# Patient Record
Sex: MALE | Race: WHITE | NOT HISPANIC OR LATINO | Employment: PART TIME | URBAN - METROPOLITAN AREA
[De-identification: names, ages, dates, MRNs, and addresses within clinical notes are randomized per-mention and may not be internally consistent; named-entity substitution may affect disease eponyms.]

---

## 2017-01-31 ENCOUNTER — OFFICE VISIT (OUTPATIENT)
Dept: URGENT CARE | Facility: URGENT CARE | Age: 32
End: 2017-01-31
Payer: COMMERCIAL

## 2017-01-31 VITALS
OXYGEN SATURATION: 98 % | HEART RATE: 118 BPM | DIASTOLIC BLOOD PRESSURE: 70 MMHG | TEMPERATURE: 98.3 F | SYSTOLIC BLOOD PRESSURE: 118 MMHG | WEIGHT: 220 LBS

## 2017-01-31 DIAGNOSIS — J20.9 ACUTE BRONCHITIS WITH SYMPTOMS > 10 DAYS: ICD-10-CM

## 2017-01-31 DIAGNOSIS — R68.89 FLU-LIKE SYMPTOMS: Primary | ICD-10-CM

## 2017-01-31 LAB
ERYTHROCYTE [DISTWIDTH] IN BLOOD BY AUTOMATED COUNT: 12.1 % (ref 10–15)
FLUAV+FLUBV AG SPEC QL: NORMAL
FLUAV+FLUBV AG SPEC QL: NORMAL
HCT VFR BLD AUTO: 48 % (ref 40–53)
HGB BLD-MCNC: 16 G/DL (ref 13.3–17.7)
MCH RBC QN AUTO: 29.7 PG (ref 26.5–33)
MCHC RBC AUTO-ENTMCNC: 33.3 G/DL (ref 31.5–36.5)
MCV RBC AUTO: 89 FL (ref 78–100)
PLATELET # BLD AUTO: 319 10E9/L (ref 150–450)
RBC # BLD AUTO: 5.39 10E12/L (ref 4.4–5.9)
SPECIMEN SOURCE: NORMAL
WBC # BLD AUTO: 5.4 10E9/L (ref 4–11)

## 2017-01-31 PROCEDURE — 36415 COLL VENOUS BLD VENIPUNCTURE: CPT | Performed by: PHYSICIAN ASSISTANT

## 2017-01-31 PROCEDURE — 87804 INFLUENZA ASSAY W/OPTIC: CPT | Performed by: PHYSICIAN ASSISTANT

## 2017-01-31 PROCEDURE — 80048 BASIC METABOLIC PNL TOTAL CA: CPT | Performed by: PHYSICIAN ASSISTANT

## 2017-01-31 PROCEDURE — 85027 COMPLETE CBC AUTOMATED: CPT | Performed by: PHYSICIAN ASSISTANT

## 2017-01-31 PROCEDURE — 99202 OFFICE O/P NEW SF 15 MIN: CPT | Performed by: PHYSICIAN ASSISTANT

## 2017-01-31 RX ORDER — AZITHROMYCIN 250 MG/1
TABLET, FILM COATED ORAL
Qty: 6 TABLET | Refills: 0 | Status: SHIPPED | OUTPATIENT
Start: 2017-01-31 | End: 2018-06-18

## 2017-01-31 NOTE — PATIENT INSTRUCTIONS
Bronchitis, Antibiotic Treatment (Adult)    Bronchitis is an infection of the air passages (bronchial tubes) in your lungs. It often occurs when you have a cold. This illness is contagious during the first few days and is spread through the air by coughing and sneezing, or by direct contact (touching the sick person and then touching your own eyes, nose, or mouth).  Symptoms of bronchitis include cough with mucus (phlegm) and low-grade fever. Bronchitis usually lasts 7 to 14 days. Mild cases can be treated with simple home remedies. More severe infection is treated with an antibiotic.  Home care  Follow these guidelines when caring for yourself at home:    If your symptoms are severe, rest at home for the first 2 to 3 days. When you go back to your usual activities, don't let yourself get too tired.    Do not smoke. Also avoid being exposed to secondhand smoke.    You may use over-the-counter medicines to control fever or pain, unless another medicine was prescribed. (Note: If you have chronic liver or kidney disease or have ever had a stomach ulcer or gastrointestinal bleeding, talk with your healthcare provider before using these medicines. Also talk to your provider if you are taking medicine to prevent blood clots.) Aspirin should never be given to anyone younger than 18 years of age who is ill with a viral infection or fever. It may cause severe liver or brain damage.    Your appetite may be poor, so a light diet is fine. Avoid dehydration by drinking 6 to 8 glasses of fluids per day (such as water, soft drinks, sports drinks, juices, tea, or soup). Extra fluids will help loosen secretions in the nose and lungs.    Over-the-counter cough, cold, and sore-throat medicines will not shorten the length of the illness, but they may be helpful to reduce symptoms. (Note: Do not use decongestants if you have high blood pressure.)    Finish all antibiotic medicine. Do this even if you are feeling better after only a  few days.  Follow-up care  Follow up with your healthcare provider, or as advised. If you had an X-ray or ECG (electrocardiogram), a specialist will review it. You will be notified of any new findings that may affect your care.  Note: If you are age 65 or older, or if you have a chronic lung disease or condition that affects your immune system, or you smoke, talk to your healthcare provider about having pneumococcal vaccinations and a yearly influenza vaccination (flu shot).  When to seek medical advice  Call your healthcare provider right away if any of these occur:    Fever of 100.4 F (38 C) or higher    Coughing up increased amounts of colored sputum    Weakness, drowsiness, headache, facial pain, ear pain, or a stiff neck   Call 911, or get immediate medical care  Contact emergency services right away if any of these occur.    Coughing up blood    Worsening weakness, drowsiness, headache, or stiff neck    Trouble breathing, wheezing, or pain with breathing    4549-7568 [employer]. [address] [place] [address]. All rights reserved. This information is not intended as a substitute for professional medical care. Always follow your healthcare professional's instructions.        Diet for Vomiting or Diarrhea (Adult)  If your symptoms return or get worse after eating certain foods listed below, you should stop eating them until your symptoms ease and you feel better.  Once the vomiting stops, then follow the steps below.   During the first 12 to 24 hours  During the first 12 to 24 hours, follow this diet:    Beverages. Plain water, sport drinks like electrolyte solutions, soft drinks without caffeine, mineral water (plain or flavored), clear fruit juices, and decaffeinated tea and coffee.    Soups. Clear broth, consommé, and bouillon.    Desserts. Plain gelatin, popsicles, and fruit juice bars. As you feel better, you may add 6 to 8 ounces of yogurt per day. If you have diarrhea, don't  have foods or beverages that contain sugar, high-fructose corn syrup, or sugar alcohols.  During the next 24 hours  During the next 24 hours you may add the following to the above:    Hot cereal, plain toast, bread, rolls, and crackers    Plain noodles, rice, mashed potatoes, and chicken noodle or rice soup    Unsweetened canned fruit (but not pineapple) and bananas  Don't have more than 15 grams of fat a day. Do this by staying away from margarine, butter, oils, mayonnaise, sauces, gravies, fried foods, peanut butter, meat, poultry, and fish.  Don't eat much fiber. Stay away from raw or cooked vegetables, fresh fruits (except bananas), and bran cereals.  Limit how much caffeine and chocolate you have. Do not use any spices or seasonings except salt.  During the next 24 hours  Gradually go back to your normal diet, as you feel better and your symptoms ease.    9403-2510 [employer]. [address] [place] [address], [place] [address]. All rights reserved. This information is not intended as a substitute for professional medical care. Always follow your healthcare professional's instructions.

## 2017-01-31 NOTE — MR AVS SNAPSHOT
After Visit Summary   1/31/2017    Mike Villalpando    MRN: [id number]           Patient Information     Date Of Birth          1985        Visit Information        Provider Department      1/31/2017 3:30 PM Mann Gifford PA-C [facility]        Today's Diagnoses     Flu-like symptoms    -  1     Acute bronchitis with symptoms > 10 days           Care Instructions      Bronchitis, Antibiotic Treatment (Adult)    Bronchitis is an infection of the air passages (bronchial tubes) in your lungs. It often occurs when you have a cold. This illness is contagious during the first few days and is spread through the air by coughing and sneezing, or by direct contact (touching the sick person and then touching your own eyes, nose, or mouth).  Symptoms of bronchitis include cough with mucus (phlegm) and low-grade fever. Bronchitis usually lasts 7 to 14 days. Mild cases can be treated with simple home remedies. More severe infection is treated with an antibiotic.  Home care  Follow these guidelines when caring for yourself at home:    If your symptoms are severe, rest at home for the first 2 to 3 days. When you go back to your usual activities, don't let yourself get too tired.    Do not smoke. Also avoid being exposed to secondhand smoke.    You may use over-the-counter medicines to control fever or pain, unless another medicine was prescribed. (Note: If you have chronic liver or kidney disease or have ever had a stomach ulcer or gastrointestinal bleeding, talk with your healthcare provider before using these medicines. Also talk to your provider if you are taking medicine to prevent blood clots.) Aspirin should never be given to anyone younger than 18 years of age who is ill with a viral infection or fever. It may cause severe liver or brain damage.    Your appetite may be poor, so a light diet is fine. Avoid dehydration by drinking 6 to 8 glasses of fluids per day (such as  water, soft drinks, sports drinks, juices, tea, or soup). Extra fluids will help loosen secretions in the nose and lungs.    Over-the-counter cough, cold, and sore-throat medicines will not shorten the length of the illness, but they may be helpful to reduce symptoms. (Note: Do not use decongestants if you have high blood pressure.)    Finish all antibiotic medicine. Do this even if you are feeling better after only a few days.  Follow-up care  Follow up with your healthcare provider, or as advised. If you had an X-ray or ECG (electrocardiogram), a specialist will review it. You will be notified of any new findings that may affect your care.  Note: If you are age 65 or older, or if you have a chronic lung disease or condition that affects your immune system, or you smoke, talk to your healthcare provider about having pneumococcal vaccinations and a yearly influenza vaccination (flu shot).  When to seek medical advice  Call your healthcare provider right away if any of these occur:    Fever of 100.4 F (38 C) or higher    Coughing up increased amounts of colored sputum    Weakness, drowsiness, headache, facial pain, ear pain, or a stiff neck   Call 911, or get immediate medical care  Contact emergency services right away if any of these occur.    Coughing up blood    Worsening weakness, drowsiness, headache, or stiff neck    Trouble breathing, wheezing, or pain with breathing    8312-2034 [employer]. [address] [place] [address]. All rights reserved. This information is not intended as a substitute for professional medical care. Always follow your healthcare professional's instructions.        Diet for Vomiting or Diarrhea (Adult)  If your symptoms return or get worse after eating certain foods listed below, you should stop eating them until your symptoms ease and you feel better.  Once the vomiting stops, then follow the steps below.   During the first 12 to 24 hours  During the first 12  to 24 hours, follow this diet:    Beverages. Plain water, sport drinks like electrolyte solutions, soft drinks without caffeine, mineral water (plain or flavored), clear fruit juices, and decaffeinated tea and coffee.    Soups. Clear broth, consommé, and bouillon.    Desserts. Plain gelatin, popsicles, and fruit juice bars. As you feel better, you may add 6 to 8 ounces of yogurt per day. If you have diarrhea, don't have foods or beverages that contain sugar, high-fructose corn syrup, or sugar alcohols.  During the next 24 hours  During the next 24 hours you may add the following to the above:    Hot cereal, plain toast, bread, rolls, and crackers    Plain noodles, rice, mashed potatoes, and chicken noodle or rice soup    Unsweetened canned fruit (but not pineapple) and bananas  Don't have more than 15 grams of fat a day. Do this by staying away from margarine, butter, oils, mayonnaise, sauces, gravies, fried foods, peanut butter, meat, poultry, and fish.  Don't eat much fiber. Stay away from raw or cooked vegetables, fresh fruits (except bananas), and bran cereals.  Limit how much caffeine and chocolate you have. Do not use any spices or seasonings except salt.  During the next 24 hours  Gradually go back to your normal diet, as you feel better and your symptoms ease.    4243-8655 The [employer]. [address] [place] [address]. All rights reserved. This information is not intended as a substitute for professional medical care. Always follow your healthcare professional's instructions.              Follow-ups after your visit        Who to contact     If you have questions or need follow up information about today's clinic visit or your schedule please contact [facility] directly at [phone].  Normal or non-critical lab and imaging results will be communicated to you by MyChart, letter or phone within 4 business days after the clinic has received the results. If you do not  "hear from us within 7 days, please contact the clinic through [email or web address] or phone. If you have a critical or abnormal lab result, we will notify you by phone as soon as possible.  Submit refill requests through [email or web address] or call your pharmacy and they will forward the refill request to us. Please allow 3 business days for your refill to be completed.          Additional Information About Your Visit        [employer] Information     [email or web address] lets you send messages to your doctor, view your test results, renew your prescriptions, schedule appointments and more. To sign up, go to [email or web address] . Click on \"Log in\" on the left side of the screen, which will take you to the Welcome page. Then click on \"Sign up Now\" on the right side of the page.     You will be asked to enter the access code listed below, as well as some personal information. Please follow the directions to create your username and password.     Your access code is: [email or web address]  Expires: 2017  4:05 PM     Your access code will  in 90 days. If you need help or a new code, please call your [place] clinic or [phone].        Care EveryWhere ID     This is your Care EveryWhere ID. This could be used by other organizations to access your [place] medical records  [id number]        Your Vitals Were     Pulse Temperature Pulse Oximetry             118 98.3  F (36.8  C) (Oral) 98%          Blood Pressure from Last 3 Encounters:   17 118/70    Weight from Last 3 Encounters:   17 220 lb (99.791 kg)              We Performed the Following     Basic metabolic panel  (Ca, Cl, CO2, Creat, Gluc, K, Na, BUN)     CBC with platelets     Influenza A/B antigen          Today's Medication Changes          These changes are accurate as of: 17  4:05 PM.  If you have any questions, ask your nurse or doctor.               Start taking these medicines.        Dose/Directions    azithromycin 250 MG tablet   Commonly known as:  ZITHROMAX   Used " for:  Acute bronchitis with symptoms > 10 days   Started by:  Mann Gifford PA-C        Two tablets first day, then one tablet daily for four days.   Quantity:  6 tablet   Refills:  0            Where to get your medicines      These medications were sent to [facility] - [address]   [address]  [address], [place] [address]     Phone:  [phone]    - azithromycin 250 MG tablet             Primary Care Provider    None Specified       No primary provider on file.        Thank you!     Thank you for choosing [facility]  for your care. Our goal is always to provide you with excellent care. Hearing back from our patients is one way we can continue to improve our services. Please take a few minutes to complete the written survey that you may receive in the mail after your visit with us. Thank you!             Your Updated Medication List - Protect others around you: Learn how to safely use, store and throw away your medicines at www.disposemymeds.org.          This list is accurate as of: 1/31/17  4:05 PM.  Always use your most recent med list.                   Brand Name Dispense Instructions for use    azithromycin 250 MG tablet    ZITHROMAX    6 tablet    Two tablets first day, then one tablet daily for four days.

## 2017-01-31 NOTE — PROGRESS NOTES
SUBJECTIVE:                                                    Mike Villalpando is a 32 year old male who presents to clinic today for the following health issues:    Acute Illness   Acute illness concerns: not getting better  Onset: 3 WEEKS      Fever: no    Chills/Sweats: YES    Headache (location?): YES    Sinus Pressure:YES    Conjunctivitis:  no    Ear Pain: no    Rhinorrhea: YES    Congestion: YES    Sore Throat: YES     Cough: YES    Wheeze: no    Decreased Appetite: YES    Nausea: YES    Vomiting: YES    Diarrhea:  YES    Dysuria/Freq.: YES- less urination within the last few days     Fatigue/Achiness: YES    Sick/Strep Exposure: no    Dehydrated      Therapies Tried and outcome: Musinex, congestion tylenol (last dose 3 days ago)     ROS:  ROS negative except as listed above      OBJECTIVE:                                                    /70 mmHg  Pulse 118  Temp(Src) 98.3  F (36.8  C) (Oral)  Wt 220 lb (99.791 kg)  SpO2 98%  There is no height on file to calculate BMI.  GENERAL: healthy, alert and no distress  NECK: no adenopathy, no asymmetry, masses, or scars and thyroid normal to palpation  RESP: lungs clear to auscultation - no rales, rhonchi or wheezes  CV: regular rate and rhythm, normal S1 S2, no S3 or S4, no murmur, click or rub, no peripheral edema and peripheral pulses strong  ABDOMEN: soft, nontender, no hepatosplenomegaly, no masses and bowel sounds normal  MS: no gross musculoskeletal defects noted, no edema    Diagnostic Test Results:    BMP, CBC pending    Results for orders placed or performed in visit on 01/31/17   Influenza A/B antigen   Result Value Ref Range    Influenza A/B Agn Specimen Nasal     Influenza A  NEG     Negative   Test results must be correlated with clinical data. If necessary, results   should be confirmed by a molecular assay or viral culture.      Influenza B  NEG     Negative   Test results must be correlated with clinical data. If necessary, results    should be confirmed by a molecular assay or viral culture.            ASSESSMENT/PLAN:                                                    (R68.89) Flu-like symptoms  (primary encounter diagnosis)  Plan: CBC with platelets, Basic metabolic panel  (Ca,        Cl, CO2, Creat, Gluc, K, Na, BUN), Influenza         A/B antigen       Advance diet as tolerated  Follow up with PCP if symptoms worsen or fail to improve      (J20.9) Acute bronchitis with symptoms > 10 days  Plan: azithromycin (ZITHROMAX) 250 MG tablet   Follow up with PCP if symptoms worsen or fail to improve          [facility]EL[facility]

## 2017-01-31 NOTE — Clinical Note
[facility]  [address]  [address]  [place] [address]  Phone: [phone]  Fax: [phone]    January 31, 2017        Mike Villalpando  [facility] [address]          To whom it may concern:    RE: Mike STOUT Kwasi    Patient was seen and treated today at our clinic and missed work.    Please contact me for questions or concerns.      Sincerely,        Mann Gifford PA-C

## 2017-01-31 NOTE — NURSING NOTE
Chief Complaint   Patient presents with     URI       Initial /70 mmHg  Pulse 118  Temp(Src) 98.3  F (36.8  C) (Oral)  Wt 220 lb (99.791 kg)  SpO2 98% There is no height on file to calculate BMI.  BP completed using cuff size: uzair Scott MA

## 2017-02-01 LAB
ANION GAP SERPL CALCULATED.3IONS-SCNC: 6 MMOL/L (ref 3–14)
BUN SERPL-MCNC: 11 MG/DL (ref 7–30)
CALCIUM SERPL-MCNC: 9.4 MG/DL (ref 8.5–10.1)
CHLORIDE SERPL-SCNC: 102 MMOL/L (ref 94–109)
CO2 SERPL-SCNC: 30 MMOL/L (ref 20–32)
CREAT SERPL-MCNC: 0.92 MG/DL (ref 0.66–1.25)
GFR SERPL CREATININE-BSD FRML MDRD: ABNORMAL ML/MIN/1.7M2
GLUCOSE SERPL-MCNC: 106 MG/DL (ref 70–99)
POTASSIUM SERPL-SCNC: 4 MMOL/L (ref 3.4–5.3)
SODIUM SERPL-SCNC: 138 MMOL/L (ref 133–144)

## 2018-06-18 ENCOUNTER — OFFICE VISIT (OUTPATIENT)
Dept: PEDIATRICS | Facility: CLINIC | Age: 33
End: 2018-06-18
Payer: COMMERCIAL

## 2018-06-18 VITALS
HEART RATE: 71 BPM | DIASTOLIC BLOOD PRESSURE: 78 MMHG | WEIGHT: 209.2 LBS | OXYGEN SATURATION: 99 % | HEIGHT: 71 IN | TEMPERATURE: 98.6 F | SYSTOLIC BLOOD PRESSURE: 122 MMHG | BODY MASS INDEX: 29.29 KG/M2

## 2018-06-18 DIAGNOSIS — F41.9 ANXIETY: Primary | ICD-10-CM

## 2018-06-18 PROCEDURE — 99213 OFFICE O/P EST LOW 20 MIN: CPT | Performed by: NURSE PRACTITIONER

## 2018-06-18 RX ORDER — TRAZODONE HYDROCHLORIDE 50 MG/1
50-100 TABLET, FILM COATED ORAL
Qty: 60 TABLET | Refills: 0 | Status: SHIPPED | OUTPATIENT
Start: 2018-06-18 | End: 2018-11-29

## 2018-06-18 ASSESSMENT — ANXIETY QUESTIONNAIRES
7. FEELING AFRAID AS IF SOMETHING AWFUL MIGHT HAPPEN: SEVERAL DAYS
5. BEING SO RESTLESS THAT IT IS HARD TO SIT STILL: MORE THAN HALF THE DAYS
GAD7 TOTAL SCORE: 13
2. NOT BEING ABLE TO STOP OR CONTROL WORRYING: MORE THAN HALF THE DAYS
6. BECOMING EASILY ANNOYED OR IRRITABLE: MORE THAN HALF THE DAYS
IF YOU CHECKED OFF ANY PROBLEMS ON THIS QUESTIONNAIRE, HOW DIFFICULT HAVE THESE PROBLEMS MADE IT FOR YOU TO DO YOUR WORK, TAKE CARE OF THINGS AT HOME, OR GET ALONG WITH OTHER PEOPLE: SOMEWHAT DIFFICULT
3. WORRYING TOO MUCH ABOUT DIFFERENT THINGS: MORE THAN HALF THE DAYS
1. FEELING NERVOUS, ANXIOUS, OR ON EDGE: MORE THAN HALF THE DAYS

## 2018-06-18 ASSESSMENT — PATIENT HEALTH QUESTIONNAIRE - PHQ9: 5. POOR APPETITE OR OVEREATING: MORE THAN HALF THE DAYS

## 2018-06-18 NOTE — PATIENT INSTRUCTIONS
1. Really good coverage sun glasses as soon as its light. Minimize fluids end of shift.  2. Melatonin 1-3mg immediately after shift  3. Take a trazodone when you get home  4. Black out shades.   5. Reduce caffeine, especially at the end of your shift.

## 2018-06-18 NOTE — PROGRESS NOTES
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 - feels anxiety started then    Abnormal Mood Symptoms      Duration: 6 months - worsened in last 6 weeks, quit chew tobacco on 6/1/18 - feels it worsened then, seems to be job related - states he is fine when off work    Description:  Depression: no  Anxiety: YES- pacing, fidgety, restless, maybe had 1 panic attack right after quitting chew  Panic attacks: YES- maybe 1 - not sure     Accompanying signs and symptoms: see PHQ-9 and JOSE ANGEL scores    History (similar episodes/previous evaluation): None    Precipitating or alleviating factors: change of work shift, quit chew    Therapies tried and outcome: none      ROS: const/psych otherwise negative     OBJECTIVE:  /78 (BP Location: Right arm, Cuff Size: Adult Large)  Pulse 71  Temp 98.6  F (37  C) (Tympanic)  Ht 5' 11\" (1.803 m)  Wt 209 lb 3.2 oz (94.9 kg)  SpO2 99%  BMI 29.18 kg/m2  CONSTITUTIONAL: Alert, well-nourished, well-groomed, NAD  RESP: Lungs CTA. No wheeze, rhonchi, rales.  CV: HRRR S1 S2 No MRG. No peripheral edema  PSYCH: Bright affect. Appropriate mentation and speech.       ASSESSMENT/PLAN:  (F41.9) Anxiety  (primary encounter diagnosis)  Comment: Patient with significant anxiety since switching to night shift as a , switching to a more dangerous position, dramatically increasing caffeine intake, and stopping chew tobacco. No SI or HI. No signs of zara. No other stressors. I think there are several contributing factors, but it seems caffeine and poor sleep are the biggest contributors.   Plan: traZODone (DESYREL) 50 MG tablet    Patient Instructions   1. Really good coverage sun glasses as soon as its light. Minimize fluids end of shift.  2. Melatonin 1-3mg immediately " after shift  3. Take a trazodone when you get home  4. Black out shades.   5. Reduce caffeine, especially at the end of your shift.   6. Call if not improving in 1-2 weeks.       TARAH Alexis-MAYA.

## 2018-06-18 NOTE — MR AVS SNAPSHOT
After Visit Summary   6/18/2018    Mike Villalpando    MRN: [id number]           Patient Information     Date Of Birth          1985        Visit Information        Provider Department      6/18/2018 2:00 PM Ida Pantoja APRN CNP [facility]        Today's Diagnoses     Anxiety    -  1      Care Instructions    1. Really good coverage sun glasses as soon as its light. Minimize fluids end of shift.  2. Melatonin 1-3mg immediately after shift  3. Take a trazodone when you get home  4. Black out shades.   5. Reduce caffeine, especially at the end of your shift.           Follow-ups after your visit        Who to contact     If you have questions or need follow up information about today's clinic visit or your schedule please contact [facility] directly at [phone].  Normal or non-critical lab and imaging results will be communicated to you by [employer], letter or phone within 4 business days after the clinic has received the results. If you do not hear from us within 7 days, please contact the clinic through [employer] or phone. If you have a critical or abnormal lab result, we will notify you by phone as soon as possible.  Submit refill requests through [employer] or call your pharmacy and they will forward the refill request to us. Please allow 3 business days for your refill to be completed.          Additional Information About Your Visit        MyChart Information     [employer] gives you secure access to your electronic health record. If you see a primary care provider, you can also send messages to your care team and make appointments. If you have questions, please call your primary care clinic.  If you do not have a primary care provider, please call [phone] and they will assist you.        Care EveryWhere ID     This is your Care EveryWhere ID. This could be used by other organizations to access your [place] medical records  [id number]        Your Vitals  "Were     Pulse Temperature Height Pulse Oximetry BMI (Body Mass Index)       71 98.6  F (37  C) (Tympanic) 5' 11\" (1.803 m) 99% 29.18 kg/m2        Blood Pressure from Last 3 Encounters:   06/18/18 122/78   01/31/17 118/70    Weight from Last 3 Encounters:   06/18/18 209 lb 3.2 oz (94.9 kg)   01/31/17 220 lb (99.8 kg)              Today, you had the following     No orders found for display         Today's Medication Changes          These changes are accurate as of 6/18/18  2:46 PM.  If you have any questions, ask your nurse or doctor.               Start taking these medicines.        Dose/Directions    traZODone 50 MG tablet   Commonly known as:  DESYREL   Used for:  Anxiety   Started by:  Ida Pantoja APRN CNP        Dose:   mg   Take 1-2 tablets ( mg) by mouth nightly as needed for sleep   Quantity:  60 tablet   Refills:  0            Where to get your medicines      These medications were sent to [facility] - [address]  [address]  [address], [place] [address]     Phone:  [phone]     traZODone 50 MG tablet                Primary Care Provider Office Phone # Fax #    [facility] [phone]       [address]  [place] [address]        Equal Access to Services     LYLE LARIOS AH: Hadii digna ku hadasho Soomaali, waaxda luqadaha, qaybta kaalmada tyree, jefferson wright. So [facility] [phone].    ATENCIÓN: Si habla español, tiene a conn disposición servicios gratuitos de asistencia lingüística. Nimisha al [phone].    We comply with applicable federal civil rights laws and [place] laws. We do not discriminate on the basis of race, color, national origin, age, disability, sex, sexual orientation, or gender identity.            Thank you!     Thank you for choosing [facility]  for your care. Our goal is always to provide you with excellent care. Hearing " back from our patients is one way we can continue to improve our services. Please take a few minutes to complete the written survey that you may receive in the mail after your visit with us. Thank you!             Your Updated Medication List - Protect others around you: Learn how to safely use, store and throw away your medicines at www.disposemymeds.org.          This list is accurate as of 6/18/18  2:46 PM.  Always use your most recent med list.                   Brand Name Dispense Instructions for use Diagnosis    traZODone 50 MG tablet    DESYREL    60 tablet    Take 1-2 tablets ( mg) by mouth nightly as needed for sleep    Anxiety

## 2018-06-19 ASSESSMENT — PATIENT HEALTH QUESTIONNAIRE - PHQ9: SUM OF ALL RESPONSES TO PHQ QUESTIONS 1-9: 13

## 2018-06-19 ASSESSMENT — ANXIETY QUESTIONNAIRES: GAD7 TOTAL SCORE: 13

## 2018-06-25 PROBLEM — F41.9 ANXIETY: Status: ACTIVE | Noted: 2018-06-25

## 2018-06-25 PROBLEM — F41.9 ANXIETY: Status: ACTIVE | Noted: 2018-06-18

## 2018-11-29 ENCOUNTER — OFFICE VISIT (OUTPATIENT)
Dept: PEDIATRICS | Facility: CLINIC | Age: 33
End: 2018-11-29
Payer: COMMERCIAL

## 2018-11-29 VITALS
TEMPERATURE: 98.6 F | SYSTOLIC BLOOD PRESSURE: 144 MMHG | BODY MASS INDEX: 31.67 KG/M2 | WEIGHT: 226.2 LBS | HEIGHT: 71 IN | DIASTOLIC BLOOD PRESSURE: 86 MMHG | OXYGEN SATURATION: 99 % | HEART RATE: 92 BPM

## 2018-11-29 DIAGNOSIS — R03.0 ELEVATED BLOOD PRESSURE READING WITHOUT DIAGNOSIS OF HYPERTENSION: ICD-10-CM

## 2018-11-29 DIAGNOSIS — F32.A ANXIETY AND DEPRESSION: Primary | ICD-10-CM

## 2018-11-29 DIAGNOSIS — F41.9 ANXIETY AND DEPRESSION: Primary | ICD-10-CM

## 2018-11-29 PROCEDURE — 99214 OFFICE O/P EST MOD 30 MIN: CPT | Performed by: NURSE PRACTITIONER

## 2018-11-29 RX ORDER — ESCITALOPRAM OXALATE 10 MG/1
10 TABLET ORAL DAILY
Qty: 90 TABLET | Refills: 3 | Status: SHIPPED | OUTPATIENT
Start: 2018-11-29 | End: 2019-11-11

## 2018-11-29 RX ORDER — TRAZODONE HYDROCHLORIDE 50 MG/1
50-100 TABLET, FILM COATED ORAL
Qty: 60 TABLET | Refills: 0 | Status: SHIPPED | OUTPATIENT
Start: 2018-11-29 | End: 2019-01-03

## 2018-11-29 ASSESSMENT — ANXIETY QUESTIONNAIRES
GAD7 TOTAL SCORE: 12
IF YOU CHECKED OFF ANY PROBLEMS ON THIS QUESTIONNAIRE, HOW DIFFICULT HAVE THESE PROBLEMS MADE IT FOR YOU TO DO YOUR WORK, TAKE CARE OF THINGS AT HOME, OR GET ALONG WITH OTHER PEOPLE: SOMEWHAT DIFFICULT
1. FEELING NERVOUS, ANXIOUS, OR ON EDGE: NEARLY EVERY DAY
2. NOT BEING ABLE TO STOP OR CONTROL WORRYING: NEARLY EVERY DAY
6. BECOMING EASILY ANNOYED OR IRRITABLE: SEVERAL DAYS
5. BEING SO RESTLESS THAT IT IS HARD TO SIT STILL: SEVERAL DAYS
3. WORRYING TOO MUCH ABOUT DIFFERENT THINGS: MORE THAN HALF THE DAYS
7. FEELING AFRAID AS IF SOMETHING AWFUL MIGHT HAPPEN: SEVERAL DAYS

## 2018-11-29 ASSESSMENT — PATIENT HEALTH QUESTIONNAIRE - PHQ9
5. POOR APPETITE OR OVEREATING: SEVERAL DAYS
SUM OF ALL RESPONSES TO PHQ QUESTIONS 1-9: 8

## 2018-11-29 NOTE — PATIENT INSTRUCTIONS
1. Try Lexapro daily. Stop Ash's wort. Ok to still use valerian  2. Therapy  3. Gym 3x per week      Crisis services in [place]   We understand that as of June 30, 2018, [facility] will no longer be providing crisis hotline services. It is important for everyone to know that there are multiple crisis services available in [place], including the National Suicide Prevention Lifeline at 725-247-UHRY (7756).   [place] will continue to have 24/7 crisis services available across the [facility], available both by phone and in-person by calling the appropriate county crisis phone number. A list of crisis services numbers can be found below as well as on the Department of Human Services website.   Crisis Text Line is a text-based crisis line available across [place] 24/7. Text [place] to 246 377.   In the metro area, people in crisis can call **CRISIS (405730) from a mobile phone. This mobile phone service will soon be available statewide.   Outside of the [facility], you can use the directory for mental health crisis phone numbers in [place] by county.   The National Suicide Prevention Lifeline will continue to be available without interruption at 556-386-DDCM (0546).

## 2018-11-29 NOTE — PROGRESS NOTES
"  SUBJECTIVE:   Mike Villalpando is a 33 year old male who presents to clinic today for the following health issues:    Started taking St Johns Wort and Valerian root about 2 weeks ago    Anxiety Follow-Up    Status since last visit: Not sure - some days are good some are \"terrible\"    Other associated symptoms:None - has had difficulty with trazodone and being called to work    Complicating factors:   Significant life event: No new  Current substance abuse: Alcohol  Depression symptoms: Yes - lack of interest  JOSE ANGEL-7 SCORE 6/18/2018 11/29/2018   Total Score 13 12     JOSE ANGEL-7    Amount of exercise or physical activity: 1 day/week for an average of 30-45 minutes - not as much lately    Problems taking medications regularly: Yes,  Trazodone sometimes interferes with work calls    Medication side effects: none    Diet: regular (no restrictions)    Patient states had flu shot at work in beginning of November.    Anxiety is about \"nothing,\"- seems irrational. Likes his job. Having some claustrophobia. Now on days. Son diagnosed with autism. Marriage is excellent-has open communication with his wife. Started when he switched to night shift, started using caffeine, stopped tobacco. Now he is on nights and using less caffeine. Has started using ETOH to relax at night. Some nights doesn't drink at all. Some days are normal and some he feels anxious and depressed. Reports less enjoyment in activities he used to like to do. Hasn't been exercising or seeing friends as much. States he likes his job and doesn't fear for his life but has been having some claustrophobia when he has to wear his gas mask.     Doesn't feel he is an alcoholic, just is trying to self medicate with alcohol. Feels he gets more anxious the day after drinking.     ROS: const/psych otherwise negative     OBJECTIVE:  /86 (BP Location: Right arm, Cuff Size: Adult Large)  Pulse 92  Temp 98.6  F (37  C) (Tympanic)  Ht 5' 11\" (1.803 m)  Wt 226 lb 3.2 oz " (102.6 kg)  SpO2 99%  BMI 31.55 kg/m2  CONSTITUTIONAL: Alert, well-nourished, well-groomed, NAD  RESP: Lungs CTA. No wheeze, rhonchi, rales.  CV: HRRR S1 S2 No MRG. No peripheral edema  PSYCH: Bright affect. Appropriate mentation and speech.     ASSESSMENT/PLAN:  (F41.9,  F32.9) Anxiety and depression  (primary encounter diagnosis)  Comment: Patient with a history of anxiety as a teen presents with anxiety for several months and, more recently, bouts of depression. See previous visit notes for anxiety history. Since then, he hast reduced caffeine but has started using alcohol at night to cope with his anxiety, sometimes 6 beers up to 4-5 nights per week. He has stopped exercising due to anhedonia. Has started taking Yeni's wort and valerian per his wife's suggestion. No improvement after a few weeks. Feels fidgety. Has trouble sleeping without trazodone. States he has had some thoughts that he would prefer to be dead but no plan. He does have access to guns as he is a . Discussed suicide risk at length and he assures me that he has not had active plans and feels confident that he and others are safe. He is aware of crisis resources and would let his wife know if he was starting to have active thoughts of harming himself or others.  No manic type sx.   Plan:   -He agrees to start lexapro. Discussed r/b/se including BB warning.  -Trazodone for sleep  -He agrees to stop ETOH as it can worsen anxiety/depression.   -He will stop Mokuleia Wort. Ok to use valerian prn a few nights per week.  -He agrees to start therapy at least weekly.  -He agrees to call me in 1 week with an update or mychart me  -Contracted for safety, safety plan discussed, crisis resources provided. Reinforced that he has many suicide risk factors (male, , anxiety, substance use, access to guns).  -OV 1 month.     (R03.0) Elevated blood pressure reading without diagnosis of hypertension  Comment: Likely 2/2 anxiety.  Asymptomatic for chest pain, shortness of breath, headache, etc.   Plan:   -F/U 1 month or prn for sx.       TARAH Alexis-MAYA.

## 2018-11-29 NOTE — MR AVS SNAPSHOT
After Visit Summary   11/29/2018    iMke Villalpando    MRN: [id number]           Patient Information     Date Of Birth          1985        Visit Information        Provider Department      11/29/2018 3:40 PM Ida Pantoja APRN CNP [facility] [place]        Today's Diagnoses     Anxiety          Care Instructions    1. Try Lexapro daily. Stop Ash's wort. Ok to still use valerian  2. Therapy  3. Gym 3x per week      Crisis services in [place]   We understand that as of June 30, 2018, Crisis [facility] will no longer be providing crisis hotline services. It is important for everyone to know that there are multiple crisis services available in [place], including the National Suicide Prevention Lifeline at 499-249-SHHB (5845).   [place] will continue to have 24/7 crisis services available across the [facility], available both by phone and in-person by calling the appropriate county crisis phone number. A list of crisis services numbers can be found below as well as on the Department of Human Services website.   Crisis Text Line is a text-based crisis line available across [place] 24/7. Text [place] to 191 931.   In the metro area, people in crisis can call **CRISIS (443889) from a mobile phone. This mobile phone service will soon be available statewide.   Outside of the [facility], you can use the directory for mental health crisis phone numbers in [place] by county.   The National Suicide Prevention Lifeline will continue to be available without interruption at 337-424-QTNL (4644).                 Follow-ups after your visit        Who to contact     If you have questions or need follow up information about today's clinic visit or your schedule please contact [facility] [place] directly at [phone].  Normal or non-critical lab and imaging results will be communicated to you by MyChart, letter or phone within 4 business days after the clinic has received the results. If you  "do not hear from us within 7 days, please contact the clinic through [employer]Studio or phone. If you have a critical or abnormal lab result, we will notify you by phone as soon as possible.  Submit refill requests through [employer]Studio or call your pharmacy and they will forward the refill request to us. Please allow 3 business days for your refill to be completed.          Additional Information About Your Visit        [employer] Information     [employer] gives you secure access to your electronic health record. If you see a primary care provider, you can also send messages to your care team and make appointments. If you have questions, please call your primary care clinic.  If you do not have a primary care provider, please call [phone] and they will assist you.        Care EveryWhere ID     This is your Care EveryWhere ID. This could be used by other organizations to access your [facility] medical records  [id number]        Your Vitals Were     Pulse Temperature Height Pulse Oximetry BMI (Body Mass Index)       92 98.6  F (37  C) (Tympanic) 5' 11\" (1.803 m) 99% 31.55 kg/m2        Blood Pressure from Last 3 Encounters:   11/29/18 144/86   06/18/18 122/78   01/31/17 118/70    Weight from Last 3 Encounters:   11/29/18 226 lb 3.2 oz (102.6 kg)   06/18/18 209 lb 3.2 oz (94.9 kg)   01/31/17 220 lb (99.8 kg)              Today, you had the following     No orders found for display         Today's Medication Changes          These changes are accurate as of 11/29/18  4:26 PM.  If you have any questions, ask your nurse or doctor.               Start taking these medicines.        Dose/Directions    escitalopram 10 MG tablet   Commonly known as:  LEXAPRO   Used for:  Anxiety   Started by:  Ida Pantoja APRN CNP        Dose:  10 mg   Take 1 tablet (10 mg) by mouth daily   Quantity:  90 tablet   Refills:  3            Where to get your medicines      These medications were sent to [facility] - [address]   [address]  [address], [place] [address]     Phone:  [phone]     escitalopram 10 MG tablet    traZODone 50 MG tablet                Primary Care Provider Office Phone # Fax #    [facility] [phone] [address]  [place] [address]        Equal Access to Services     LYLE LARIOS : Hadii aad ku hadasho Soomaali, waaxda luqadaha, qaybta kaalmada adeegyada, waxay idiin hayaan adeeg kharash laShakilaaan . So [facility] [phone].    ATENCIÓN: Si habla español, tiene a conn disposición servicios gratuitos de asistencia lingüística. [facility] [phone].    We comply with applicable federal civil rights laws and [place] laws. We do not discriminate on the basis of race, color, national origin, age, disability, sex, sexual orientation, or gender identity.            Thank you!     Thank you for choosing [facility]  for your care. Our goal is always to provide you with excellent care. Hearing back from our patients is one way we can continue to improve our services. Please take a few minutes to complete the written survey that you may receive in the mail after your visit with us. Thank you!             Your Updated Medication List - Protect others around you: Learn how to safely use, store and throw away your medicines at www.disposemymeds.org.          This list is accurate as of 11/29/18  4:26 PM.  Always use your most recent med list.                   Brand Name Dispense Instructions for use Diagnosis    escitalopram 10 MG tablet    LEXAPRO    90 tablet    Take 1 tablet (10 mg) by mouth daily    Anxiety       MELATONIN PO      Take 5 mg by mouth At Bedtime    Anxiety       ST WHITESIDE WORT PO       Anxiety       traZODone 50 MG tablet    DESYREL    60 tablet    Take 1-2 tablets ( mg) by mouth nightly as needed for sleep    Anxiety       VALERIAN ROOT PO       Anxiety

## 2018-11-30 ASSESSMENT — ANXIETY QUESTIONNAIRES: GAD7 TOTAL SCORE: 12

## 2018-12-06 PROBLEM — F32.A ANXIETY AND DEPRESSION: Status: ACTIVE | Noted: 2018-12-06

## 2018-12-06 PROBLEM — F32.A ANXIETY AND DEPRESSION: Status: ACTIVE | Noted: 2018-11-29

## 2018-12-06 PROBLEM — F41.9 ANXIETY AND DEPRESSION: Status: ACTIVE | Noted: 2018-11-29

## 2018-12-06 PROBLEM — F41.9 ANXIETY AND DEPRESSION: Status: ACTIVE | Noted: 2018-12-06

## 2019-01-03 ENCOUNTER — OFFICE VISIT (OUTPATIENT)
Dept: PEDIATRICS | Facility: CLINIC | Age: 34
End: 2019-01-03
Payer: COMMERCIAL

## 2019-01-03 VITALS
HEART RATE: 74 BPM | WEIGHT: 231.1 LBS | OXYGEN SATURATION: 99 % | SYSTOLIC BLOOD PRESSURE: 142 MMHG | BODY MASS INDEX: 32.35 KG/M2 | TEMPERATURE: 98.2 F | HEIGHT: 71 IN | DIASTOLIC BLOOD PRESSURE: 72 MMHG

## 2019-01-03 DIAGNOSIS — F41.9 ANXIETY AND DEPRESSION: Primary | ICD-10-CM

## 2019-01-03 DIAGNOSIS — R03.0 ELEVATED BLOOD PRESSURE READING WITHOUT DIAGNOSIS OF HYPERTENSION: ICD-10-CM

## 2019-01-03 DIAGNOSIS — F10.10 ALCOHOL ABUSE: ICD-10-CM

## 2019-01-03 DIAGNOSIS — F32.A ANXIETY AND DEPRESSION: Primary | ICD-10-CM

## 2019-01-03 PROCEDURE — 99214 OFFICE O/P EST MOD 30 MIN: CPT | Performed by: NURSE PRACTITIONER

## 2019-01-03 RX ORDER — BUPROPION HYDROCHLORIDE 300 MG/1
300 TABLET ORAL EVERY MORNING
Qty: 90 TABLET | Refills: 3 | Status: SHIPPED | OUTPATIENT
Start: 2019-01-03 | End: 2020-01-08

## 2019-01-03 RX ORDER — TRAZODONE HYDROCHLORIDE 50 MG/1
50-100 TABLET, FILM COATED ORAL
Qty: 60 TABLET | Refills: 3 | Status: SHIPPED | OUTPATIENT
Start: 2019-01-03 | End: 2021-08-10

## 2019-01-03 ASSESSMENT — ANXIETY QUESTIONNAIRES
GAD7 TOTAL SCORE: 6
6. BECOMING EASILY ANNOYED OR IRRITABLE: SEVERAL DAYS
7. FEELING AFRAID AS IF SOMETHING AWFUL MIGHT HAPPEN: NOT AT ALL
IF YOU CHECKED OFF ANY PROBLEMS ON THIS QUESTIONNAIRE, HOW DIFFICULT HAVE THESE PROBLEMS MADE IT FOR YOU TO DO YOUR WORK, TAKE CARE OF THINGS AT HOME, OR GET ALONG WITH OTHER PEOPLE: SOMEWHAT DIFFICULT
3. WORRYING TOO MUCH ABOUT DIFFERENT THINGS: SEVERAL DAYS
2. NOT BEING ABLE TO STOP OR CONTROL WORRYING: SEVERAL DAYS
5. BEING SO RESTLESS THAT IT IS HARD TO SIT STILL: SEVERAL DAYS
1. FEELING NERVOUS, ANXIOUS, OR ON EDGE: SEVERAL DAYS

## 2019-01-03 ASSESSMENT — PATIENT HEALTH QUESTIONNAIRE - PHQ9
SUM OF ALL RESPONSES TO PHQ QUESTIONS 1-9: 9
5. POOR APPETITE OR OVEREATING: SEVERAL DAYS

## 2019-01-03 ASSESSMENT — MIFFLIN-ST. JEOR: SCORE: 2010.39

## 2019-01-03 NOTE — LETTER
My Depression Action Plan  Name: Mike Villalpando   Date of Birth 1985  Date: 1/3/2019    My doctor: Krystal Rios   My clinic: [facility] [place] [facility]  [address]  [place] [address] [phone]          GREEN    ZONE   Good Control    What it looks like:     Things are going generally well. You have normal up s and down s. You may even feel depressed from time to time, but bad moods usually last less than a day.   What you need to do:  1. Continue to care for yourself (see self care plan)  2. Check your depression survival kit and update it as needed  3. Follow your physician s recommendations including any medication.  4. Do not stop taking medication unless you consult with your physician first.           YELLOW         ZONE Getting Worse    What it looks like:     Depression is starting to interfere with your life.     It may be hard to get out of bed; you may be starting to isolate yourself from others.    Symptoms of depression are starting to last most all day and this has happened for several days.     You may have suicidal thoughts but they are not constant.   What you need to do:     1. Call your care team, your response to treatment will improve if you keep your care team informed of your progress. Yellow periods are signs an adjustment may need to be made.     2. Continue your self-care, even if you have to fake it!    3. Talk to someone in your support network    4. Open up your depression survival kit           RED    ZONE Medical Alert - Get Help    What it looks like:     Depression is seriously interfering with your life.     You may experience these or other symptoms: You can t get out of bed most days, can t work or engage in other necessary activities, you have trouble taking care of basic hygiene, or basic responsibilities, thoughts of suicide or death that will not go away, self-injurious behavior.     What you need to do:  1. Call  your care team and request a same-day appointment. If they are not available (weekends or after hours) call your local crisis line, emergency room or 911.            Depression Self Care Plan / Survival Kit    Self-Care for Depression  Here s the deal. Your body and mind are really not as separate as most people think.  What you do and think affects how you feel and how you feel influences what you do and think. This means if you do things that people who feel good do, it will help you feel better.  Sometimes this is all it takes.  There is also a place for medication and therapy depending on how severe your depression is, so be sure to consult with your medical provider and/ or Behavioral Health Consultant if your symptoms are worsening or not improving.     In order to better manage my stress, I will:    Exercise  Get some form of exercise, every day. This will help reduce pain and release endorphins, the  feel good  chemicals in your brain. This is almost as good as taking antidepressants!  This is not the same as joining a gym and then never going! (they count on that by the way ) It can be as simple as just going for a walk or doing some gardening, anything that will get you moving.      Hygiene   Maintain good hygiene (Get out of bed in the morning, Make your bed, Brush your teeth, Take a shower, and Get dressed like you were going to work, even if you are unemployed).  If your clothes don't fit try to get ones that do.    Diet  I will strive to eat foods that are good for me, drink plenty of water, and avoid excessive sugar, caffeine, alcohol, and other mood-altering substances.  Some foods that are helpful in depression are: complex carbohydrates, B vitamins, flaxseed, fish or fish oil, fresh fruits and vegetables.    Psychotherapy  I agree to participate in Individual Therapy (if recommended).    Medication  If prescribed medications, I agree to take them.  Missing doses can result in serious side effects.   I understand that drinking alcohol, or other illicit drug use, may cause potential side effects.  I will not stop my medication abruptly without first discussing it with my provider.    Staying Connected With Others  I will stay in touch with my friends, family members, and my primary care provider/team.    Use your imagination  Be creative.  We all have a creative side; it doesn t matter if it s oil painting, sand castles, or mud pies! This will also kick up the endorphins.    Witness Beauty  (AKA stop and smell the roses) Take a look outside, even in mid-winter. Notice colors, textures. Watch the squirrels and birds.     Service to others  Be of service to others.  There is always someone else in need.  By helping others we can  get out of ourselves  and remember the really important things.  This also provides opportunities for practicing all the other parts of the program.    Humor  Laugh and be silly!  Adjust your TV habits for less news and crime-drama and more comedy.    Control your stress  Try breathing deep, massage therapy, biofeedback, and meditation. Find time to relax each day.     My support system    Clinic Contact:  Phone number:    Contact 1:  Phone number:    Contact 2:  Phone number:    Gnosticism/:  Phone number:    Therapist:  Phone number:    [facility] crisis center:    Phone number:    Other community support:  Phone number:

## 2019-01-03 NOTE — PATIENT INSTRUCTIONS
-Continue therapy  -Continue trazodone and lexapro  -Start Wellbutrin  -Try to force yourself to the gym

## 2019-01-03 NOTE — PROGRESS NOTES
"  SUBJECTIVE:   Mike Villalpando is a 33 year old male who presents to clinic today for the following health issues:    Anxiety Follow-Up    Status since last visit: Improved anxiety, depression better but not as improved as anxiety    Other associated symptoms:loss of energy, little interest or pleasure in doing things    Complicating factors:   Significant life event: No   Current substance abuse: Alcohol  Depression symptoms: Yes  JOSE ANGEL-7 SCORE 6/18/2018 11/29/2018 1/3/2019   Total Score 13 12 6     JOSE ANGEL-7    Amount of exercise or physical activity: None    Problems taking medications regularly: No    Medication side effects: mild dizziness intermittent, sometimes nausea    Diet: regular (no restrictions)    Patient states had flu shot at [employer] end of Nov 2018.    States he started seeing a therapist who is an ex .  Anxiety has dramatically reduced, although it is still present at times.  Suicidal thinking is gone.  Still has severe anhedonia.  Is drinking about 6 beers per night.   Still not using tobacco.     ROS: const/psych/gi otherwise negative     OBJECTIVE:  /72 (BP Location: Right arm, Cuff Size: Adult Large)   Pulse 74   Temp 98.2  F (36.8  C) (Tympanic)   Ht 1.803 m (5' 11\")   Wt 104.8 kg (231 lb 1.6 oz)   SpO2 99%   BMI 32.23 kg/m    CONSTITUTIONAL: Alert, well-nourished, well-groomed, NAD  RESP: Lungs CTA. No wheeze, rhonchi, rales.  CV: HRRR S1 S2 No MRG. No peripheral edema  GI: Abdomen flat. BS x 4. No TTP. No HSM or masses. No CVAT  PSYCH: Bright affect. Appropriate mentation and speech.       ASSESSMENT/PLAN:  (F41.9,  F32.9) Anxiety and depression  (primary encounter diagnosis)  Comment: Ongoing. Anxiety has dramatically improved. Suicidal thinking has ceased. Still severely anhedonic and drinking 5-6 beers per night, sometimes more.   Plan: DEPRESSION ACTION PLAN (DAP), traZODone         (DESYREL) 50 MG tablet, buPROPion (WELLBUTRIN         XL) 300 MG 24 hr tablet        "   -Declines addiction med referral   -Declines IOP therapy  -Continue current therapy once per week  -Continue lexapro  -Add Wellbutrin  -Reduce drinking  -Continue going to the gym  -F/U 1 month  -Crisis resources reviewed. Discussed supportive cares and reasons to return. Discussed reasons to seek care urgently.   -At next visit may recommend IOP for depression/ETOH if not improving.     (F10.10) Alcohol abuse  Comment: As above. Decliens checking LFTs and outpatient treatment.   Plan:   -Recommended reduction/abstinance.   -Will continue to monitor.     (R03.0) Elevated blood pressure reading without diagnosis of hypertension  Comment: Ongoing. Declines meds for today. Asymptomatic.   Plan:   -Continue to monitor  -Discussed supportive cares and reasons to return. Discussed reasons to seek care urgently.   -Will likely recommend BP meds again at next visit.       Ida Pantoja, TARAH-DNP.

## 2019-01-04 ASSESSMENT — ANXIETY QUESTIONNAIRES: GAD7 TOTAL SCORE: 6

## 2019-02-21 ENCOUNTER — OFFICE VISIT (OUTPATIENT)
Dept: PEDIATRICS | Facility: CLINIC | Age: 34
End: 2019-02-21
Payer: COMMERCIAL

## 2019-02-21 VITALS
HEIGHT: 71 IN | SYSTOLIC BLOOD PRESSURE: 124 MMHG | BODY MASS INDEX: 32.09 KG/M2 | HEART RATE: 68 BPM | TEMPERATURE: 97.6 F | DIASTOLIC BLOOD PRESSURE: 74 MMHG | OXYGEN SATURATION: 99 % | WEIGHT: 229.2 LBS

## 2019-02-21 DIAGNOSIS — F41.9 ANXIETY AND DEPRESSION: ICD-10-CM

## 2019-02-21 DIAGNOSIS — F32.A ANXIETY AND DEPRESSION: ICD-10-CM

## 2019-02-21 PROCEDURE — 99213 OFFICE O/P EST LOW 20 MIN: CPT | Performed by: NURSE PRACTITIONER

## 2019-02-21 RX ORDER — BUPROPION HYDROCHLORIDE 300 MG/1
300 TABLET ORAL EVERY MORNING
Qty: 90 TABLET | Refills: 3 | Status: CANCELLED | OUTPATIENT
Start: 2019-02-21

## 2019-02-21 RX ORDER — TRAZODONE HYDROCHLORIDE 50 MG/1
50-100 TABLET, FILM COATED ORAL
Qty: 60 TABLET | Refills: 3 | Status: CANCELLED | OUTPATIENT
Start: 2019-02-21

## 2019-02-21 RX ORDER — ESCITALOPRAM OXALATE 10 MG/1
10 TABLET ORAL DAILY
Qty: 90 TABLET | Refills: 3 | Status: CANCELLED | OUTPATIENT
Start: 2019-02-21

## 2019-02-21 ASSESSMENT — ANXIETY QUESTIONNAIRES
IF YOU CHECKED OFF ANY PROBLEMS ON THIS QUESTIONNAIRE, HOW DIFFICULT HAVE THESE PROBLEMS MADE IT FOR YOU TO DO YOUR WORK, TAKE CARE OF THINGS AT HOME, OR GET ALONG WITH OTHER PEOPLE: NOT DIFFICULT AT ALL
2. NOT BEING ABLE TO STOP OR CONTROL WORRYING: NOT AT ALL
5. BEING SO RESTLESS THAT IT IS HARD TO SIT STILL: NOT AT ALL
1. FEELING NERVOUS, ANXIOUS, OR ON EDGE: SEVERAL DAYS
7. FEELING AFRAID AS IF SOMETHING AWFUL MIGHT HAPPEN: NOT AT ALL
3. WORRYING TOO MUCH ABOUT DIFFERENT THINGS: NOT AT ALL
6. BECOMING EASILY ANNOYED OR IRRITABLE: SEVERAL DAYS
GAD7 TOTAL SCORE: 2

## 2019-02-21 ASSESSMENT — PATIENT HEALTH QUESTIONNAIRE - PHQ9
SUM OF ALL RESPONSES TO PHQ QUESTIONS 1-9: 4
5. POOR APPETITE OR OVEREATING: NOT AT ALL

## 2019-02-21 ASSESSMENT — MIFFLIN-ST. JEOR: SCORE: 1996.77

## 2019-02-21 NOTE — PROGRESS NOTES
"  SUBJECTIVE:   Mike Villalpando is a 34 year old male who presents to clinic today for the following health issues:    Depression and Anxiety Follow-Up    Status since last visit: Improved  - feels like medications are really helping    Other associated symptoms:None    Complicating factors:     Significant life event: No     Current substance abuse: None    PHQ 11/29/2018 1/3/2019 2/21/2019   PHQ-9 Total Score 8 9 4   Q9: Suicide Ideation Several days Not at all Not at all     JOSE ANGEL-7 SCORE 11/29/2018 1/3/2019 2/21/2019   Total Score 12 6 2     In the past two weeks have you had thoughts of suicide or self-harm?  No.    Do you have concerns about your personal safety or the safety of others?   No  PHQ-9  English  PHQ-9   Any Language  JOSE ANGEL-7  Suicide Assessment Five-step Evaluation and Treatment (SAFE-T)    Amount of exercise or physical activity: 6-7 days/week for an average of greater than 60 minutes    Problems taking medications regularly: No    Medication side effects: occasional upset stomach if empty stomach    Diet: regular (no restrictions)    ROS: const/psych otherwise negative     OBJECTIVE:  /74 (BP Location: Right arm, Cuff Size: Adult Large)   Pulse 68   Temp 97.6  F (36.4  C) (Tympanic)   Ht 1.803 m (5' 11\")   Wt 104 kg (229 lb 3.2 oz)   SpO2 99%   BMI 31.97 kg/m    CONSTITUTIONAL: Alert, well-nourished, well-groomed, NAD  RESP: Lungs CTA. No wheeze, rhonchi, rales.  CV: HRRR S1 S2 No MRG. No peripheral edema  PSYCH: Bright affect. Appropriate mentation and speech.       ASSESSMENT/PLAN:  (F41.9,  F32.9) Anxiety and depression  Comment: Dramatic improvement since adding Wellbutrin to the Lexapro. Hard to tell which med is helping because the lexapro was at about 4-5 weeks when he added the Wellbutrin, which is about how much time it takes for the Lexapro to start working. No SI/HI whatsoever. Still doing counseling. Has more motivation and no anhedonia  Plan:   -Continue to monitor for " worsening sx. Discussed reasons to seek care urgently.   -Crisis resources discussed  -Recommended continuing meds for at least 6-9 months. F/U in November (would be about 9 months).  -At that time would recommend weaning Lexapro to 5mg if doing well and continuing Wellbutrin. If doing well 3 months after that could consider Stopping lexapro and continuing Wellbutrin. If doing well 3 months after that could consider reducing Wellbutrin to 150.      Ida Pantoja, GLADYSP-DNP.

## 2019-02-22 ASSESSMENT — ANXIETY QUESTIONNAIRES: GAD7 TOTAL SCORE: 2

## 2019-05-17 ENCOUNTER — ANCILLARY PROCEDURE (OUTPATIENT)
Dept: GENERAL RADIOLOGY | Facility: CLINIC | Age: 34
End: 2019-05-17
Attending: PEDIATRICS
Payer: COMMERCIAL

## 2019-05-17 ENCOUNTER — OFFICE VISIT (OUTPATIENT)
Dept: PEDIATRICS | Facility: CLINIC | Age: 34
End: 2019-05-17
Payer: COMMERCIAL

## 2019-05-17 VITALS
DIASTOLIC BLOOD PRESSURE: 90 MMHG | HEART RATE: 81 BPM | HEIGHT: 71 IN | TEMPERATURE: 98.2 F | OXYGEN SATURATION: 99 % | SYSTOLIC BLOOD PRESSURE: 150 MMHG | WEIGHT: 227.8 LBS | BODY MASS INDEX: 31.89 KG/M2

## 2019-05-17 DIAGNOSIS — M79.604 PAIN OF RIGHT LOWER EXTREMITY: Primary | ICD-10-CM

## 2019-05-17 DIAGNOSIS — M79.604 PAIN OF RIGHT LOWER EXTREMITY: ICD-10-CM

## 2019-05-17 PROCEDURE — 73630 X-RAY EXAM OF FOOT: CPT | Mod: RT

## 2019-05-17 PROCEDURE — 99213 OFFICE O/P EST LOW 20 MIN: CPT | Performed by: PEDIATRICS

## 2019-05-17 ASSESSMENT — MIFFLIN-ST. JEOR: SCORE: 1990.42

## 2019-05-17 NOTE — PATIENT INSTRUCTIONS
Xray looks normal to me.  I will contact you if radiology sees any changes.    Recommend taking ibuprofen 800mg three times per day for next 3 days (must take with food) - then go as needed.  Icing over the weekend.    Recommend hard soled shoes until better

## 2019-05-17 NOTE — PROGRESS NOTES
"  SUBJECTIVE:   Mike Villalpando is a 34 year old male who presents to clinic today for the following   health issues:      Musculoskeletal problem/pain      Duration: yesterday night     Description  Location: big right toe    Intensity:  moderate    Accompanying signs and symptoms: swelling and discoloration of toe and throbbing , top and inside of the foot     History  Previous similar problem: no   Previous evaluation:  none    Precipitating or alleviating factors:  Trauma or overuse: YES- caught on the stairs   Aggravating factors include: standing, walking and weight or pressure applied to area    Therapies tried and outcome: elevation, ice and Iburpofen       Additional history: as documented    Reviewed  and updated as needed this visit by clinical staff  Tobacco  Allergies  Meds  Med Hx  Surg Hx  Fam Hx  Soc Hx        Reviewed and updated as needed this visit by Provider             ROS:  Constitutional, HEENT, cardiovascular, pulmonary, gi and gu systems are negative, except as otherwise noted.    OBJECTIVE:     /90 (BP Location: Right arm, Patient Position: Chair, Cuff Size: Adult Large)   Pulse 81   Temp 98.2  F (36.8  C) (Oral)   Ht 1.803 m (5' 11\")   Wt 103.3 kg (227 lb 12.8 oz)   SpO2 99%   BMI 31.77 kg/m    Body mass index is 31.77 kg/m .  EXT: right foot with ecchymoses starting midfoot to big toe, limited active range of motion of all toes.  Full ankle range of motion.  +point tenderness mid-foot and big toe    Diagnostic Test Results:  Right foot XR neg per my read    ASSESSMENT/PLAN:       1. Pain of right lower extremity  Xr neg - c/w sprain.  See PI.  If not improving in 2 weeks need to consider ligament or tendon injury. Patient is  - no work restrictions needed at this time.   - XR Foot Right G/E 3 Views; Future    Patient Instructions   Xray looks normal to me.  I will contact you if radiology sees any changes.    Recommend taking ibuprofen 800mg three times per " day for next 3 days (must take with food) - then go as needed.  Icing over the weekend.    Recommend hard soled shoes until better      Ida Hancock MD  [facility]

## 2019-07-01 ENCOUNTER — TELEPHONE (OUTPATIENT)
Dept: PEDIATRICS | Facility: CLINIC | Age: 34
End: 2019-07-01

## 2019-07-01 NOTE — TELEPHONE ENCOUNTER
Clinic Action Needed:Yes, please return call    Reason for Call: Maximilian is trying to increase his muscle size/mass and ordered a testosterone supplement.  He received and hasn't started to take, however there is a warning label on the supplement regarding Cancer and he'd like to speak to his care team about this.  He works out 6-7 days a week and is on other oral supplements.  He also takes and anti depressant and anti anxiety medication.  Please return call to discuss further.  He said it's ok to call him at work at [phone] or his cell.  Thank you.     Routed to:  [facility] Fermin Peña, RN  [facility]

## 2019-07-02 NOTE — TELEPHONE ENCOUNTER
"Called pt. No answer. Did not leave VM since it was a work number.    Pt should be asked more about what supplements they are taking, the ingredients. FDA does not regulated supplements, little can be informed about them.    We should should be encouraging the pt to discuss their concerns with a PCP in an OV about testosterone and indications for use, wether supplemental or Controlled medication for low testosterone.    Sent [facility] message.    Singh \"Rui\" RUTH Bey - [facility]    "

## 2019-09-10 ENCOUNTER — ANCILLARY PROCEDURE (OUTPATIENT)
Dept: GENERAL RADIOLOGY | Facility: CLINIC | Age: 34
End: 2019-09-10
Attending: FAMILY MEDICINE
Payer: COMMERCIAL

## 2019-09-10 ENCOUNTER — OFFICE VISIT (OUTPATIENT)
Dept: URGENT CARE | Facility: URGENT CARE | Age: 34
End: 2019-09-10
Payer: COMMERCIAL

## 2019-09-10 VITALS
SYSTOLIC BLOOD PRESSURE: 158 MMHG | RESPIRATION RATE: 18 BRPM | WEIGHT: 227 LBS | TEMPERATURE: 97.8 F | DIASTOLIC BLOOD PRESSURE: 98 MMHG | OXYGEN SATURATION: 98 % | HEART RATE: 80 BPM | BODY MASS INDEX: 31.66 KG/M2

## 2019-09-10 DIAGNOSIS — S62.656A CLOSED NONDISPLACED FRACTURE OF MIDDLE PHALANX OF RIGHT LITTLE FINGER, INITIAL ENCOUNTER: Primary | ICD-10-CM

## 2019-09-10 DIAGNOSIS — M79.644 PAIN OF FINGER OF RIGHT HAND: ICD-10-CM

## 2019-09-10 PROCEDURE — 73140 X-RAY EXAM OF FINGER(S): CPT | Mod: RT

## 2019-09-10 PROCEDURE — 99213 OFFICE O/P EST LOW 20 MIN: CPT | Performed by: FAMILY MEDICINE

## 2019-09-10 NOTE — LETTER
[facility]  [address]  [address]  [place] [address]  [phone]      September 10, 2019    RE:  Mike Villalpando                                                                                                                                                       To whom it may concern:    Mike Villalpando is under my professional care for what appears to be a partially-healed fracture of his fifth finger on his right hand.  The fracture appears to be in good position and healing quite well; however, this may impact his ability to participate in a few of the hands-on maneuvers during training, so please be understanding if he needs to use an ACE wrap for extra protection above and beyond the typical abran-taping of this injury.  I do not anticipate that this injury will restrict his participation in the firearms portion of the training program.    Sincerely,        Angélica Patel MD    [facility]

## 2019-09-10 NOTE — PROGRESS NOTES
SUBJECTIVE:  Chief Complaint   Patient presents with     Urgent Care     Finger     R hand pinkie pain pt jammed X2 wks     Mike Villalpando is a 34 year old right-handed male presents with a chief complaint of right fifth finger pain.  The injury occurred 3 week(s) ago.   The injury happened while wrestling with his 15-year-old nephew. How: jammed finger on floor and it got caught in the carpet.  delayed pain, delayed swelling.  The patient complained of moderate pain which has now mostly subsided and has not had decreased ROM.  Pain exacerbated by repetitive motion.  Relieved by rest.  He treated it initially with ice and Ibuprofen. This is the first time this type of injury has occurred to this patient. No numbness or tingling in the hand.    No past medical history on file.  Current Outpatient Medications   Medication Sig Dispense Refill     buPROPion (WELLBUTRIN XL) 300 MG 24 hr tablet Take 1 tablet (300 mg) by mouth every morning 90 tablet 3     escitalopram (LEXAPRO) 10 MG tablet Take 1 tablet (10 mg) by mouth daily 90 tablet 3     MELATONIN PO Take 5 mg by mouth nightly as needed        traZODone (DESYREL) 50 MG tablet Take 1-2 tablets ( mg) by mouth nightly as needed for sleep 60 tablet 3     Social History     Tobacco Use     Smoking status: Never Smoker     Smokeless tobacco: Former User     Types: Chew   Substance Use Topics     Alcohol use: Yes     Comment: 2 times per week, 1-6 per time     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    ROS:  As above.    EXAM:   BP (!) 158/98   Pulse 80   Temp 97.8  F (36.6  C)   Resp 18   Wt 103 kg (227 lb)   SpO2 98%   BMI 31.66 kg/m    Gen: healthy,alert,no distress  Extremity: R 5th finger has tenderness around the middle phalanx and is held a little farther from the neighboring digit than the corresponding fingers on the contralateral side.  There is no ecchymosis.  The joints do not appear swollen.   There is not compromise to the distal  circulation.  Pulses are +2 and CRT is brisk  SKIN: no suspicious lesions or rashes  NEURO: Normal strength and tone, sensory exam grossly normal, mentation intact and speech normal    X-RAY was done.  I see what appears to be a non-displaced healing fracture of the middle phalanx of the right 5th digit.    ASSESSMENT:   Nondisplaced finger fracture, R 5th digit middle phalanx    PLAN:  Patient Instructions   Buddy tape the affected finger for comfort as needed over the next 2-3 weeks.  If still sore after 3 weeks, please follow up with primary care provider or sports medicine clinic for recheck.    Use Tylenol or ibuprofen as needed.    Arnica gel:  Apply small amount to the sore finger 2-3 times per day as needed.

## 2019-09-10 NOTE — PATIENT INSTRUCTIONS
Buddy tape the affected finger for comfort as needed over the next 2-3 weeks.  If still sore after 3 weeks, please follow up with primary care provider or sports medicine clinic for recheck.    Use Tylenol or ibuprofen as needed.    Arnica gel:  Apply small amount to the sore finger 2-3 times per day as needed.

## 2019-11-07 ENCOUNTER — PRE VISIT (OUTPATIENT)
Dept: PEDIATRICS | Facility: CLINIC | Age: 34
End: 2019-11-07

## 2019-11-07 NOTE — TELEPHONE ENCOUNTER
Pre-Visit Planning     Future Appointments   Date Time Provider Department Center   11/11/2019  3:20 PM Ida Pantoja APRN CNP [facility]     Appointment Notes for this encounter:   Data Unavailable    Questionnaires Reviewed/Assigned  No additional questionnaires are needed        Patient preferred phone number: [phone]    Unable to reach. Left voicemail. Advised patient to call clinic back at [phone].

## 2019-11-11 ENCOUNTER — OFFICE VISIT (OUTPATIENT)
Dept: PEDIATRICS | Facility: CLINIC | Age: 34
End: 2019-11-11
Payer: COMMERCIAL

## 2019-11-11 VITALS
RESPIRATION RATE: 16 BRPM | DIASTOLIC BLOOD PRESSURE: 88 MMHG | HEIGHT: 71 IN | WEIGHT: 239.2 LBS | TEMPERATURE: 98.1 F | BODY MASS INDEX: 33.49 KG/M2 | SYSTOLIC BLOOD PRESSURE: 144 MMHG | OXYGEN SATURATION: 99 % | HEART RATE: 73 BPM

## 2019-11-11 DIAGNOSIS — F32.A ANXIETY AND DEPRESSION: ICD-10-CM

## 2019-11-11 DIAGNOSIS — Z01.83 BLOOD TYPING ENCOUNTER: ICD-10-CM

## 2019-11-11 DIAGNOSIS — F10.10 ALCOHOL ABUSE: ICD-10-CM

## 2019-11-11 DIAGNOSIS — Z23 NEED FOR IMMUNIZATION AGAINST INFLUENZA: Primary | ICD-10-CM

## 2019-11-11 DIAGNOSIS — F41.9 ANXIETY AND DEPRESSION: ICD-10-CM

## 2019-11-11 PROCEDURE — 86901 BLOOD TYPING SEROLOGIC RH(D): CPT | Performed by: NURSE PRACTITIONER

## 2019-11-11 PROCEDURE — 96127 BRIEF EMOTIONAL/BEHAV ASSMT: CPT | Mod: 59 | Performed by: NURSE PRACTITIONER

## 2019-11-11 PROCEDURE — 36415 COLL VENOUS BLD VENIPUNCTURE: CPT | Performed by: NURSE PRACTITIONER

## 2019-11-11 PROCEDURE — 99214 OFFICE O/P EST MOD 30 MIN: CPT | Mod: 25 | Performed by: NURSE PRACTITIONER

## 2019-11-11 PROCEDURE — 90471 IMMUNIZATION ADMIN: CPT | Performed by: NURSE PRACTITIONER

## 2019-11-11 PROCEDURE — 86900 BLOOD TYPING SEROLOGIC ABO: CPT | Performed by: NURSE PRACTITIONER

## 2019-11-11 PROCEDURE — 90686 IIV4 VACC NO PRSV 0.5 ML IM: CPT | Performed by: NURSE PRACTITIONER

## 2019-11-11 PROCEDURE — 80053 COMPREHEN METABOLIC PANEL: CPT | Performed by: NURSE PRACTITIONER

## 2019-11-11 RX ORDER — ESCITALOPRAM OXALATE 10 MG/1
15 TABLET ORAL DAILY
Qty: 135 TABLET | Refills: 0 | Status: SHIPPED | OUTPATIENT
Start: 2019-11-11 | End: 2020-01-31

## 2019-11-11 RX ORDER — NALTREXONE HYDROCHLORIDE 50 MG/1
50 TABLET, FILM COATED ORAL DAILY
Qty: 30 TABLET | Refills: 11 | Status: SHIPPED | OUTPATIENT
Start: 2019-11-11 | End: 2021-04-05

## 2019-11-11 ASSESSMENT — ANXIETY QUESTIONNAIRES
1. FEELING NERVOUS, ANXIOUS, OR ON EDGE: SEVERAL DAYS
2. NOT BEING ABLE TO STOP OR CONTROL WORRYING: SEVERAL DAYS
GAD7 TOTAL SCORE: 7
6. BECOMING EASILY ANNOYED OR IRRITABLE: SEVERAL DAYS
IF YOU CHECKED OFF ANY PROBLEMS ON THIS QUESTIONNAIRE, HOW DIFFICULT HAVE THESE PROBLEMS MADE IT FOR YOU TO DO YOUR WORK, TAKE CARE OF THINGS AT HOME, OR GET ALONG WITH OTHER PEOPLE: SOMEWHAT DIFFICULT
7. FEELING AFRAID AS IF SOMETHING AWFUL MIGHT HAPPEN: SEVERAL DAYS
5. BEING SO RESTLESS THAT IT IS HARD TO SIT STILL: SEVERAL DAYS
3. WORRYING TOO MUCH ABOUT DIFFERENT THINGS: SEVERAL DAYS

## 2019-11-11 ASSESSMENT — PATIENT HEALTH QUESTIONNAIRE - PHQ9
SUM OF ALL RESPONSES TO PHQ QUESTIONS 1-9: 7
5. POOR APPETITE OR OVEREATING: SEVERAL DAYS

## 2019-11-11 ASSESSMENT — MIFFLIN-ST. JEOR: SCORE: 2042.13

## 2019-11-11 NOTE — PATIENT INSTRUCTIONS
-Addiction medicine physician will call you (I recommend Dr. Hammonds)  -Naltrexone once daily  -Increase lexapro to 15  -Continue Wellbutrin  -Force yourself to the gym 6 days a week  -See me in a month

## 2019-11-11 NOTE — Clinical Note
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

## 2019-11-11 NOTE — PROGRESS NOTES
"Subjective     Mike Villalpando is a 34 year old male who presents to clinic today for the following health issues:    HPI   Depression and Anxiety Follow-Up    How are you doing with your depression since your last visit? Worsened     How are you doing with your anxiety since your last visit?  No change    Are you having other symptoms that might be associated with depression or anxiety? Yes:  intermittent loss of interest and \"low\" feelings    Have you had a significant life event? No     Do you have any concerns with your use of alcohol or other drugs? Yes:  alcohol - slef medicating    Social History     Tobacco Use     Smoking status: Never Smoker     Smokeless tobacco: Former User     Types: Chew   Substance Use Topics     Alcohol use: Yes     Comment: 2 times per week, 1-6 per time     Drug use: No     PHQ 1/3/2019 2/21/2019 11/11/2019   PHQ-9 Total Score 9 4 7   Q9: Thoughts of better off dead/self-harm past 2 weeks Not at all Not at all Not at all     JOSE ANGEL-7 SCORE 1/3/2019 2/21/2019 11/11/2019   Total Score 6 2 7     Last PHQ-9 11/11/2019   1.  Little interest or pleasure in doing things 1   2.  Feeling down, depressed, or hopeless 1   3.  Trouble falling or staying asleep, or sleeping too much 1   4.  Feeling tired or having little energy 1   5.  Poor appetite or overeating 1   6.  Feeling bad about yourself 1   7.  Trouble concentrating 1   8.  Moving slowly or restless 0   Q9: Thoughts of better off dead/self-harm past 2 weeks 0   PHQ-9 Total Score 7   Difficulty at work, home, or with people Somewhat difficult     In the past two weeks have you had thoughts of suicide or self-harm?  No.    Do you have concerns about your personal safety or the safety of others?   No    Suicide Assessment Five-step Evaluation and Treatment (SAFE-T)    States overall symptoms began around the time he quit using dipping tobacco.  States he developed severe anxiety and depression when he started seeing me.  We started him on " Chief Complaint   Patient presents with    Nasal Congestion     started yesterday, was sick 10/23/17, went to [facility], told had a virus    Sneezing     started yesterday, constant sneezing, has not tried any OTC medications,denies fever, sore throat, chills, body aches "Lexapro and Wellbutrin and did some lifestyle changes which helped immensely.  Reports, around August, he started worsening again and has gotten even worse in the last 2 weeks.  He is to exercise almost every day, and now has trouble making it to the gym more than once or twice a week.  Has always drinking daily, but now is drinking is escalated to about 6 beers per day.  He feels he uses the alcohol to numb.  Sometimes he drinks more on weekends.  He never drinks and drives or drinks on the job.  States his wife is very concerned but is not threatening to leave him.  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  He is very concerned about seeking any sort of treatment for his mental health issues due to his job.  He is afraid of losing his job, or losing his reputation among the community or his coworkers.  States he likes his job and does not report a significant amount of anxiety with his job.  He does have a special needs son, who causes him some stress but he loves his wife and family.  States he is not currently having any suicidal thinking, but he has in the past.  States he does have access to gun and does not have a way to lock it up away from himself.  Does not feel this time that he is a harm to himself or others.    ROS: const/psych/gi/neuro otherwise negative     OBJECTIVE:  BP (!) 144/88 (BP Location: Right arm, Cuff Size: Adult Large)   Pulse 73   Temp 98.1  F (36.7  C) (Tympanic)   Resp 16   Ht 1.803 m (5' 11\")   Wt 108.5 kg (239 lb 3.2 oz)   SpO2 99%   BMI 33.36 kg/m    CONSTITUTIONAL: Alert, well-nourished, well-groomed, NAD  RESP: Lungs CTA. No wheeze, rhonchi, rales.  CV: HRRR S1 S2 No MRG. No peripheral edema  PSYCH: Bright affect. Appropriate mentation and speech.       ASSESSMENT/PLAN:  (Z23) Need for immunization against influenza  (primary encounter diagnosis)  Plan: INFLUENZA VACCINE IM > 6 MONTHS VALENT IIV4         [71432],      ADMIN " VACCINE, FIRST [92719]            (F41.9,  F32.9) Anxiety and depression  Comment: As above. Very concerning given level of ETOH and anxiety/depression. No current SI/HI but does have access to a gun. I think the ETOH and mood issues are feeding off one another. Good social support. Case complicated by the fact that he is hesitant to seek dual diagnosis IOP due to fear that he may lose his job or lose his reputation.   Plan: escitalopram (LEXAPRO) 10 MG tablet, naltrexone        (DEPADE/REVIA) 50 MG tablet, MENTAL HEALTH         REFERRAL  - Adult; Psychiatry and Medication         Management, Addiction Medicine Provider;         Psychiatry; [facility]: Psychiatry Clinic [phone]; Addiction Medicine Evaluation &         Treatment; Addiction Medicine Consultation,         Evaluation & Treatme...          -Referred to addiction medicine  -Start naltrexone. Reviewed r/b/se and how to take. Could consider injection in the future. 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 Reviewed seizure risk with ETOH withdrawal.  -Go to gym 7 days per week  -F/U 1 month  -Contracted for safety. If developing ANY suicidal thoughts should remove himself from his weapon. Discussed various options for that.    (F10.10) Alcohol abuse  Plan: escitalopram (LEXAPRO) 10 MG tablet, naltrexone        (DEPADE/REVIA) 50 MG tablet, Comprehensive         metabolic panel            (Z01.83) Blood typing encounter  Comment: Patient curiosity  Plan: ABO and Rh              Ida Pantoja, TARAH-DNP.

## 2019-11-12 ENCOUNTER — TELEPHONE (OUTPATIENT)
Dept: ADDICTION MEDICINE | Facility: CLINIC | Age: 34
End: 2019-11-12

## 2019-11-12 LAB
ABO + RH BLD: NORMAL
ABO + RH BLD: NORMAL
ALBUMIN SERPL-MCNC: 4.8 G/DL (ref 3.4–5)
ALP SERPL-CCNC: 57 U/L (ref 40–150)
ALT SERPL W P-5'-P-CCNC: 85 U/L (ref 0–70)
ANION GAP SERPL CALCULATED.3IONS-SCNC: 7 MMOL/L (ref 3–14)
AST SERPL W P-5'-P-CCNC: 42 U/L (ref 0–45)
BILIRUB SERPL-MCNC: 0.5 MG/DL (ref 0.2–1.3)
BUN SERPL-MCNC: 18 MG/DL (ref 7–30)
CALCIUM SERPL-MCNC: 9.6 MG/DL (ref 8.5–10.1)
CHLORIDE SERPL-SCNC: 101 MMOL/L (ref 94–109)
CO2 SERPL-SCNC: 30 MMOL/L (ref 20–32)
CREAT SERPL-MCNC: 0.92 MG/DL (ref 0.66–1.25)
GFR SERPL CREATININE-BSD FRML MDRD: >90 ML/MIN/{1.73_M2}
GLUCOSE SERPL-MCNC: 99 MG/DL (ref 70–99)
POTASSIUM SERPL-SCNC: 4.6 MMOL/L (ref 3.4–5.3)
PROT SERPL-MCNC: 8.2 G/DL (ref 6.8–8.8)
SODIUM SERPL-SCNC: 138 MMOL/L (ref 133–144)
SPECIMEN EXP DATE BLD: NORMAL

## 2019-11-12 ASSESSMENT — ANXIETY QUESTIONNAIRES: GAD7 TOTAL SCORE: 7

## 2019-11-12 NOTE — TELEPHONE ENCOUNTER
Pt is scheduled with Dr. Hammonds on 11/14/19 @ 4:00 @ [facility] . Closing encounter as no further follow up is needed.     Yuliana Carmona  [facility]

## 2019-11-12 NOTE — TELEPHONE ENCOUNTER
Please review referral. Please route back to reception pool #[id number].    Thank you,    Yuliana Carmona  [facility]

## 2019-11-13 PROBLEM — F10.10 ALCOHOL ABUSE: Status: ACTIVE | Noted: 2019-11-13

## 2019-11-14 ENCOUNTER — OFFICE VISIT (OUTPATIENT)
Dept: ADDICTION MEDICINE | Facility: CLINIC | Age: 34
End: 2019-11-14
Payer: COMMERCIAL

## 2019-11-14 ENCOUNTER — PATIENT OUTREACH (OUTPATIENT)
Dept: CARE COORDINATION | Facility: CLINIC | Age: 34
End: 2019-11-14

## 2019-11-14 VITALS
SYSTOLIC BLOOD PRESSURE: 124 MMHG | HEART RATE: 85 BPM | HEIGHT: 71 IN | DIASTOLIC BLOOD PRESSURE: 82 MMHG | TEMPERATURE: 97.1 F | RESPIRATION RATE: 16 BRPM | WEIGHT: 243 LBS | BODY MASS INDEX: 34.02 KG/M2 | OXYGEN SATURATION: 100 %

## 2019-11-14 DIAGNOSIS — F10.10 ALCOHOL ABUSE: Primary | ICD-10-CM

## 2019-11-14 PROCEDURE — 99213 OFFICE O/P EST LOW 20 MIN: CPT | Performed by: FAMILY MEDICINE

## 2019-11-14 ASSESSMENT — MIFFLIN-ST. JEOR: SCORE: 2059.37

## 2019-11-14 NOTE — PROGRESS NOTES
Clinic Care Coordination Contact  [facility]/Voicemail    Referral Source: PCP  Clinical Data: Care Coordinator Outreach  Outreach attempted x 1.  Left message on patient's voicemail with call back information and requested return call.  Plan:  Care Coordinator will try to reach patient again in 3-5 business days.    Randall Zapata [facility]  Clinic Care Coordinator  [facility]  [phone]  [email or web address]

## 2019-11-16 NOTE — PROGRESS NOTES
Subjective     Mike Villalpando is a 34 year old male who presents to clinic today for the following health issues:    HPI   New Patient/Transfer of Care      ADDICTION MEDICINE NOTE:    Referred by PCP    Has been treated for anxiety/depression for the past year    Some better but describes mood as flat    Has periodic problems with insomnia, pacing    Admits to heavy usage of alcohol   Drinks beer - six 16 oz beers per night    Admits wife bothered by his drinking    Understands that alcohol adversely impacting his mood    Very reluctant to say what work he does; discussed at length    Not sure if he wants to stop drinking because it's one thing he can count on, use to deal with life    Discussed at length    Discussed disease of addiction and the loss of control and use despite adverse consequences as hallmark aspects    He's fearful of an evaluation or AA due to his work    Advised of [facility]    Advised I can connect him with someone in his same line of work to talk to    Needs to become ready to admit he has a problem and have a desire to stop drinking; not there yet    Questions answered    Will re-check as he desires    Patient Active Problem List   Diagnosis     Anxiety     Anxiety and depression     Alcohol abuse     Elevated liver function tests     Past Surgical History:   Procedure Laterality Date     wisdom teeth         Social History     Tobacco Use     Smoking status: Never Smoker     Smokeless tobacco: Former User     Types: Chew   Substance Use Topics     Alcohol use: Yes     Comment: 2 times per week, 1-6 per time     Family History   Problem Relation Age of Onset     Diabetes Mother      Hypertension Mother      Diabetes Maternal Grandfather      Coronary Artery Disease Early Onset Maternal Uncle      Prostate Cancer No family hx of          Current Outpatient Medications   Medication Sig Dispense Refill     buPROPion (WELLBUTRIN XL) 300 MG 24 hr tablet Take 1 tablet (300 mg) by mouth every  "morning 90 tablet 3     escitalopram (LEXAPRO) 10 MG tablet Take 1.5 tablets (15 mg) by mouth daily 135 tablet 0     MELATONIN PO Take 5 mg by mouth nightly as needed        naltrexone (DEPADE/REVIA) 50 MG tablet Take 1 tablet (50 mg) by mouth daily 30 tablet 11     traZODone (DESYREL) 50 MG tablet Take 1-2 tablets ( mg) by mouth nightly as needed for sleep 60 tablet 3     Allergies   Allergen Reactions     Amoxicillin      As a child     Neosporin [Neomycin-Polymyx-Gramicid]      As a child     Penicillin G      As a child     Septra [Sulfamethoxazole W/Trimethoprim]      As a child         Reviewed and updated as needed this visit by Provider         Review of Systems   ROS COMP: Constitutional, HEENT, cardiovascular, pulmonary, GI, , musculoskeletal, neuro, skin, endocrine and psych systems are negative, except as otherwise noted.      Objective    /82   Pulse 85   Temp 97.1  F (36.2  C) (Temporal)   Resp 16   Ht 1.803 m (5' 11\")   Wt 110.2 kg (243 lb)   SpO2 100%   BMI 33.89 kg/m    Body mass index is 33.89 kg/m .  Physical Exam   GENERAL: healthy, alert and no distress  NECK: no adenopathy, no asymmetry, masses, or scars and thyroid normal to palpation  RESP: lungs clear to auscultation - no rales, rhonchi or wheezes  CV: regular rate and rhythm, normal S1 S2, no S3 or S4, no murmur, click or rub, no peripheral edema and peripheral pulses strong  ABDOMEN: soft, nontender, no hepatosplenomegaly, no masses and bowel sounds normal  MS: no gross musculoskeletal defects noted, no edema  NEURO: Normal strength and tone, mentation intact and speech normal  PSYCH: mentation appears normal, affect normal/bright  Mental Status:   General appearance:  Appropriate, well kept   Mood: anxious   Cognition: Appropriate for age   Orientation: Times three   Insight: poor   Memory: Appropriate long term / Short term   Psychosis: Denies   Suicidal / Homicidal Thoughts: Denies       Diagnostic Test " Results:  Labs reviewed in Epic        Assessment & Plan      ALCOHOL ABUSE, POSSIBLE DEPENDENCE        FUTURE APPOINTMENTS:       - Follow-up visit as desired      Adilson Hammonds MD  [facility]

## 2019-11-18 ENCOUNTER — TELEPHONE (OUTPATIENT)
Dept: ADDICTION MEDICINE | Facility: CLINIC | Age: 34
End: 2019-11-18

## 2019-11-18 NOTE — TELEPHONE ENCOUNTER
Left message for patient to call 3 times on 11/16/19 and today  I have contact info for him from a patient willing to talk to him about recovering from Addiction with his profession.    No call back from patient

## 2019-11-19 ENCOUNTER — PATIENT OUTREACH (OUTPATIENT)
Dept: CARE COORDINATION | Facility: CLINIC | Age: 34
End: 2019-11-19

## 2019-11-19 NOTE — PROGRESS NOTES
Clinic Care Coordination Contact  [facility]/Voicemail       Clinical Data: Care Coordinator Outreach  Outreach attempted x 2.  Left message on patient's voicemail with call back information and requested return call.  Plan: Care Coordinator will send care coordination introduction letter with care coordinator contact information and explanation of care coordination services via [employer]. Care Coordinator will do no further outreaches at this time.    Randall Zapata \[facility]\""  Clinic Care Coordinator  [facility]  [phone]  [email or web address]

## 2019-11-19 NOTE — LETTER
[facility] CARE COORDINATION  [address]  [place] [address]    November 19, 2019    Mike Villalpando  [address] [place] [id number]      Dear Mike,    I am a clinic care coordinator who works with [facility]. I have been trying to reach you recently to introduce Clinic Care Coordination and to see if there was anything I could assist you with.  I wanted to introduce myself and provide you with my contact information so that you can call me with questions or concerns about your health care. Below is a description of clinic care coordination and how I can further assist you.     The clinic care coordinator is a registered nurse and/or  who understand the health care system. The goal of clinic care coordination is to help you manage your health and improve access to the [facility] system in the most efficient manner. The registered nurse can assist you in meeting your health care goals by providing education, coordinating services, and strengthening the communication among your providers. The  can assist you with financial, behavioral, psychosocial, chemical dependency, counseling, and/or psychiatric resources.    Please feel free to contact me with any questions or concerns. We at [facility] are focused on providing you with the highest-quality healthcare experience possible and that all starts with you.     Sincerely,     Randall Zapata RIGO  Clinic Care Coordinator  [facility]UNE [facility] WATKINS [facility]  [phone]  [email or web address]

## 2020-01-08 DIAGNOSIS — F32.A ANXIETY AND DEPRESSION: ICD-10-CM

## 2020-01-08 DIAGNOSIS — F41.9 ANXIETY AND DEPRESSION: ICD-10-CM

## 2020-01-08 RX ORDER — BUPROPION HYDROCHLORIDE 300 MG/1
300 TABLET ORAL EVERY MORNING
Qty: 30 TABLET | Refills: 0 | Status: SHIPPED | OUTPATIENT
Start: 2020-01-08 | End: 2020-01-31

## 2020-01-09 NOTE — TELEPHONE ENCOUNTER
PHQ-9 SCORE 1/3/2019 2/21/2019 11/11/2019   PHQ-9 Total Score 9 4 7     Looks like an extensive plan created at last office visit and he did see addiction med doc, unclear what plan will be. [facility]: please reach out to pt to see how he's doing and repeat phq. Needs ot schedule a follow up appointment with PCP as soon as she returns. Refilled x 1 month in order to do the above. Thanks!

## 2020-01-09 NOTE — TELEPHONE ENCOUNTER
Called pt to f/u with below. No answer, LVM to call back on personal extension.    Lyndon Gray, EMT at 7:59 AM on January 9, 2020   [facility] Health Guide   [phone]

## 2020-01-10 ASSESSMENT — PATIENT HEALTH QUESTIONNAIRE - PHQ9: SUM OF ALL RESPONSES TO PHQ QUESTIONS 1-9: 6

## 2020-01-10 NOTE — TELEPHONE ENCOUNTER
Pt called back. Pt reports he is doing fine. Picked up his prescription and is taking as prescribed. Filled PHQ-9 - Total score: 6. Also scheduled pt with Ida Pantoja on 1/31/2020, soonest appt that works for pt.    PHQ-9 SCORE 2/21/2019 11/11/2019 1/10/2020   PHQ-9 Total Score 4 7 6     Lyndon Gray, EMT at 3:21 PM on January 10, 2020   [facility] Health Guide   [phone]

## 2020-01-10 NOTE — TELEPHONE ENCOUNTER
Pt called my extension while out of office. Called pt back to try and schedule f/u and to redo PHQ-9. No answer, LVM to call back on personal extension.    Lyndon Gray, EMT at 1:35 PM on January 10, 2020   [facility] Health Guide   [phone]

## 2020-01-28 NOTE — PROGRESS NOTES
"Pre-Visit Planning     Future Appointments   Date Time Provider Department Center   1/31/2020  2:15 PM Ida Pantoja, APRN CNP [facility]     Arrival Time for this Appointment:  1:55 PM   Appointment Notes for this encounter:   Per PCP - depression/anxiety f/u and med f/u    Questionnaires Reviewed/Assigned  No additional questionnaires are needed      Patient preferred phone number: [phone]    Spoke to patient via phone. Patient does not have additional questions or concerns.       Visit is not preventive.    Health Maintenance Due   Topic Date Due     PREVENTIVE CARE VISIT  1984     ANNUAL REVIEW OF HM ORDERS  1984     HIV SCREENING  01/25/1999     LIPID  01/25/2019     MyChart  Patient is active on MyChart.    Questionnaire Review   Advised patient to arrive early in order to complete questionnaires.    Call Summary  \"Thank you for your time today.  If anything comes up before your appointment, please feel free to contact us at [phone].\"    Previous appointment instructions      (F41.9,  F32.9) Anxiety and depression  Comment: As above. Very concerning given level of ETOH and anxiety/depression. No current SI/HI but does have access to a gun. I think the ETOH and mood issues are feeding off one another. Good social support. Case complicated by the fact that he is hesitant to seek dual diagnosis IOP due to fear that he may lose his job or lose his reputation.   Plan: escitalopram (LEXAPRO) 10 MG tablet, naltrexone(DEPADE/REVIA) 50 MG tablet, MENTAL HEALTH  REFERRAL   -Referred to addiction medicine  -Start naltrexone. Reviewed r/b/se and how to take. Could consider injection in the future. 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 Reviewed seizure risk with ETOH withdrawal.  -Go to gym 7 days per " week  -F/U 1 month  -Contracted for safety. If developing ANY suicidal thoughts should remove himself from his weapon. Discussed various options for that.     (F10.10) Alcohol abuse  Plan: escitalopram (LEXAPRO) 10 MG tablet, naltrexone        (DEPADE/REVIA) 50 MG tablet, Comprehensive metabolic panel    Saw addiction medicine on 11/14/19 - had not followed up at the time.  Pt noted that last time he saw Dr. Hammonds, he wasn't ready to change but notes that he is wanting to be re-referred.    CC reached out but never got in contact with pt.    PHQ-9 SCORE 2/21/2019 11/11/2019 1/10/2020   PHQ-9 Total Score 4 7 6     Are you having any problems taking your medications as prescribed?    No, pt notes he is without side effects from his medications.    Lyndon Gray, EMT at 2:32 PM on January 28, 2020   [facility] Health Guide   [phone]

## 2020-01-28 NOTE — PROGRESS NOTES
"Subjective     Mike Villalpando is a 35 year old male who presents to clinic today for the following health issues:    Wondering about seeing Dr Hammonds at chemical dependency clinic again.    States about 2 weeks ago he was having a blue day. Drank a lot, and was having \"really dark thoughts.\" Wasn't necessarily planning suicide but states the thoughts of death seemed more intrusive than in the past, and that he was less able to connect with the reasons not to harm himself. The next day he woke up and it \"clicked\" that he needed to stop drinking, and that the drinking was making the depression and anxiety much worse despite the fact that he was drinking to cope with these feelings. States he started drinking more when he stopped chewing tobacco cold turkey. He feels this led to him drinking more which he feels worsened his depression and anxiety.    Hasn't had a drink in a week and is \"feeling great.\"He did not go through withdrawal. However, he states about 4 days ago was out at a restaurant and was very distracted by a woman drinking wine. States his mood has been much better since quitting and hasn't had any suicidal thoughts whatsoever.     Wants to see Dr. Hammonds again to discuss alcoholism treatment. Found a therapist who can see him without it being a problem for his line of work (). States he plans to be sober forever, doesn't think he'll be someone who can ever drink casually again.     History of Present Illness        Mental Health Follow-up:  Patient presents to follow-up on Depression.Patient's depression since last visit has been:  Better  The patient is having other symptoms associated with depression.      Any significant life events: job concerns  Patient is not feeling anxious or having panic attacks.  Patient has concerns about alcohol or drug use.     Social History  Tobacco Use    Smoking status: Never Smoker    Smokeless tobacco: Former User      Types: Chew  Alcohol use: Yes    " Comment: 2 times per week, 1-6 per time  Drug use: No      Today's PHQ-9         PHQ-9 Total Score:     (P) 7   PHQ-9 Q9 Thoughts of better off dead/self-harm past 2 weeks :   (P) Several days   Thoughts of suicide or self harm:  (P) Yes   Self-harm Plan:    (P) No   Self-harm Action:      (P) No   Safety concerns for self or others: (P) No             Social History     Tobacco Use     Smoking status: Never Smoker     Smokeless tobacco: Former User     Types: Chew   Substance Use Topics     Alcohol use: Yes     Comment: 2 times per week, 1-6 per time     Drug use: No     PHQ 11/11/2019 1/10/2020 1/31/2020   PHQ-9 Total Score 7 6 7   Q9: Thoughts of better off dead/self-harm past 2 weeks Not at all Not at all Several days   F/U: Thoughts of suicide or self-harm - - Yes   F/U: Self harm-plan - - No   F/U: Self-harm action - - No   F/U: Safety concerns - - No     JOSE ANGEL-7 SCORE 2/21/2019 11/11/2019 1/31/2020   Total Score - - 7 (mild anxiety)   Total Score 2 7 7   Answers for HPI/ROS submitted by the patient on 1/31/2020   Chronic problems general questions HPI Form  If you checked off any problems, how difficult have these problems made it for you to do your work, take care of things at home, or get along with other people?: Somewhat difficult  PHQ9 TOTAL SCORE: 7  JOSE ANGEL 7 TOTAL SCORE: 7    Last PHQ-9 1/31/2020   1.  Little interest or pleasure in doing things 1   2.  Feeling down, depressed, or hopeless 1   3.  Trouble falling or staying asleep, or sleeping too much 1   4.  Feeling tired or having little energy 1   5.  Poor appetite or overeating 1   6.  Feeling bad about yourself 1   7.  Trouble concentrating 0   8.  Moving slowly or restless 0   Q9: Thoughts of better off dead/self-harm past 2 weeks 1   PHQ-9 Total Score 7   Difficulty at work, home, or with people -   In the past two weeks have you had thoughts of suicide or self harm? Yes   Do you have concerns about your personal safety or the safety of others? No  "  In the past 2 weeks have you thought about a plan or had intention to harm yourself? No   In the past 2 weeks have you acted on these thoughts in any way? No     JOSE ANGEL-7  1/31/2020   1. Feeling nervous, anxious, or on edge 1   2. Not being able to stop or control worrying 1   3. Worrying too much about different things 1   4. Trouble relaxing 1   5. Being so restless that it is hard to sit still 1   6. Becoming easily annoyed or irritable 1   7. Feeling afraid, as if something awful might happen 1   JOSE ANGEL-7 Total Score 7   If you checked any problems, how difficult have they made it for you to do your work, take care of things at home, or get along with other people? -     In the past two weeks have you had thoughts of suicide or self-harm?  Yes  In the past two weeks have you thought of a plan or intent to harm yourself? No.  Do you have concerns about your personal safety or the safety of others?   No    Suicide Assessment Five-step Evaluation and Treatment (SAFE-T)    ROS: const/psych otherwise negative     OBJECTIVE:  BP (!) 142/84 (BP Location: Right arm, Cuff Size: Adult Large)   Pulse 76   Temp 97.6  F (36.4  C) (Tympanic)   Resp 14   Ht 1.803 m (5' 11\")   Wt 109.7 kg (241 lb 12.8 oz)   SpO2 98%   BMI 33.72 kg/m    CONSTITUTIONAL: Alert, well-nourished, well-groomed, NAD  RESP: Lungs CTA. No wheeze, rhonchi, rales.  CV: HRRR S1 S2 No MRG. No peripheral edema  PSYCH: Bright affect. Appropriate mentation and speech.       ASSESSMENT/PLAN:  (F41.9,  F32.9) Anxiety and depression  Comment: This seems to be a potential turning point for him. Had a dark episode 2 weeks ago with some suicidal thoughts which lead him to quit drinking x 1 week. He is feeling much brighter. No SI and no ETOH x 1 week.   Plan: escitalopram (LEXAPRO) 10 MG tablet, buPROPion         (WELLBUTRIN XL) 300 MG 24 hr tablet          -Congratulated him on making this decision but also warned that he's not necessarily \"in the clear\" from " mood symptoms, drinking, or suicidal risk, which he is fully aware of.  -Contracted for safety, crisis resources discussed. Particular attention paid to the risk associated with having a gun  -Start naltrexone. He is Eager to see Dr. Hammonds again  -Has a therapy appt set up  -Continue lexapro/wellbutrin  -F/U 1 month. Will recheck LFTs at that time.     (F10.10) Alcohol abuse  Comment: As above.   Plan: escitalopram (LEXAPRO) 10 MG tablet              TARAH Alexis-MAYA.

## 2020-01-29 ENCOUNTER — PRE VISIT (OUTPATIENT)
Dept: PEDIATRICS | Facility: CLINIC | Age: 35
End: 2020-01-29

## 2020-01-29 NOTE — TELEPHONE ENCOUNTER
Called pt to [facility].    Saw addiction medicine on 11/14/19 - had not followed up at the time.  Pt noted that last time he saw Dr. Hammonds, he wasn't ready to change but notes that he is wanting to be re-referred.    CC reached out but never got in contact with pt.    See PVP note in visit chart for details.    Lyndon Gray, EMT at 2:48 PM on January 29, 2020   [facility] Health Guide   [phone]

## 2020-01-31 ENCOUNTER — OFFICE VISIT (OUTPATIENT)
Dept: PEDIATRICS | Facility: CLINIC | Age: 35
End: 2020-01-31
Payer: COMMERCIAL

## 2020-01-31 VITALS
WEIGHT: 241.8 LBS | HEIGHT: 71 IN | TEMPERATURE: 97.6 F | DIASTOLIC BLOOD PRESSURE: 84 MMHG | RESPIRATION RATE: 14 BRPM | BODY MASS INDEX: 33.85 KG/M2 | OXYGEN SATURATION: 98 % | HEART RATE: 76 BPM | SYSTOLIC BLOOD PRESSURE: 142 MMHG

## 2020-01-31 DIAGNOSIS — F32.A ANXIETY AND DEPRESSION: ICD-10-CM

## 2020-01-31 DIAGNOSIS — F10.10 ALCOHOL ABUSE: ICD-10-CM

## 2020-01-31 DIAGNOSIS — F41.9 ANXIETY AND DEPRESSION: ICD-10-CM

## 2020-01-31 PROCEDURE — 99214 OFFICE O/P EST MOD 30 MIN: CPT | Performed by: NURSE PRACTITIONER

## 2020-01-31 RX ORDER — BUPROPION HYDROCHLORIDE 300 MG/1
300 TABLET ORAL EVERY MORNING
Qty: 90 TABLET | Refills: 1 | Status: SHIPPED | OUTPATIENT
Start: 2020-01-31 | End: 2020-08-24

## 2020-01-31 RX ORDER — ESCITALOPRAM OXALATE 10 MG/1
15 TABLET ORAL DAILY
Qty: 135 TABLET | Refills: 1 | Status: SHIPPED | OUTPATIENT
Start: 2020-01-31 | End: 2020-08-24

## 2020-01-31 ASSESSMENT — ANXIETY QUESTIONNAIRES
3. WORRYING TOO MUCH ABOUT DIFFERENT THINGS: SEVERAL DAYS
4. TROUBLE RELAXING: SEVERAL DAYS
6. BECOMING EASILY ANNOYED OR IRRITABLE: SEVERAL DAYS
GAD7 TOTAL SCORE: 7
1. FEELING NERVOUS, ANXIOUS, OR ON EDGE: SEVERAL DAYS
GAD7 TOTAL SCORE: 7
GAD7 TOTAL SCORE: 7
7. FEELING AFRAID AS IF SOMETHING AWFUL MIGHT HAPPEN: SEVERAL DAYS
7. FEELING AFRAID AS IF SOMETHING AWFUL MIGHT HAPPEN: SEVERAL DAYS
5. BEING SO RESTLESS THAT IT IS HARD TO SIT STILL: SEVERAL DAYS
2. NOT BEING ABLE TO STOP OR CONTROL WORRYING: SEVERAL DAYS

## 2020-01-31 ASSESSMENT — PATIENT HEALTH QUESTIONNAIRE - PHQ9
SUM OF ALL RESPONSES TO PHQ QUESTIONS 1-9: 7
10. IF YOU CHECKED OFF ANY PROBLEMS, HOW DIFFICULT HAVE THESE PROBLEMS MADE IT FOR YOU TO DO YOUR WORK, TAKE CARE OF THINGS AT HOME, OR GET ALONG WITH OTHER PEOPLE: SOMEWHAT DIFFICULT
SUM OF ALL RESPONSES TO PHQ QUESTIONS 1-9: 7

## 2020-01-31 ASSESSMENT — MIFFLIN-ST. JEOR: SCORE: 2048.93

## 2020-01-31 NOTE — PATIENT INSTRUCTIONS
Dr. Hammonds: [facility]: Psychiatry Clinic [phone]    Continue meds    Recheck liver in 3-6 months

## 2020-02-01 ASSESSMENT — PATIENT HEALTH QUESTIONNAIRE - PHQ9: SUM OF ALL RESPONSES TO PHQ QUESTIONS 1-9: 7

## 2020-02-01 ASSESSMENT — ANXIETY QUESTIONNAIRES: GAD7 TOTAL SCORE: 7

## 2020-04-06 ENCOUNTER — TELEPHONE (OUTPATIENT)
Dept: PEDIATRICS | Facility: CLINIC | Age: 35
End: 2020-04-06

## 2020-04-06 NOTE — LETTER
April 10, 2020      Mike Villalpando  [address]  [place] [address]        Dear Maximilian,       We care about your health and have reviewed your health plan including your medical conditions, medications, and lab results.  Based on this review, it is recommended that you follow up regarding the following health topic(s):  -Depression  -Anxiety    We recommend you take the following action(s):  -schedule a FOLLOWUP APPOINTMENT.    At this time [facility] is not seeing patients in-person due to COVID-19. Our providers are doing phone and video visits.    Please call us at the [facility] - [phone] (or use [employer]) to address the above recommendations.     Thank you for trusting [facility] and we appreciate the opportunity to serve you.  We look forward to supporting your healthcare needs in the future.    Healthy Regards,    Your Health Care Team  [facility]

## 2020-04-17 NOTE — TELEPHONE ENCOUNTER
Called and left VM for patient to contact clinic.  Patient needs dep/anx follow up.  Hilaria Villalpando, CMA

## 2020-06-23 ENCOUNTER — VIRTUAL VISIT (OUTPATIENT)
Dept: FAMILY MEDICINE | Facility: OTHER | Age: 35
End: 2020-06-23

## 2020-06-23 NOTE — PROGRESS NOTES
"Date: 2020 15:47:43  Clinician: Yesenia Ibarra  Clinician NPI: [id number]  Patient: Mike Villalpando  Patient : 1985  Patient Address: [address] [place] [address]  Patient Phone: [phone]  Visit Protocol: URI  Patient Summary:  Mike is a 35 year old ( : 1985 ) male who initiated a Visit for COVID-19 (Coronavirus) evaluation and screening. When asked the question \"Please sign me up to receive news, health information and promotions from [employer].\", Mike responded \"No\".    Mike states his symptoms started gradually 3-4 days ago.   His symptoms consist of wheezing, nausea, malaise, myalgia, nasal congestion, vomiting, rhinitis, and a headache. He is experiencing difficulty breathing due to nasal congestion but he is not short of breath.   Symptom details     Nasal secretions: The color of his mucus is yellow.    Wheezing: Mike has not ever been diagnosed with asthma. Additional wheezing details as reported by the patient (free text): It only occurs when I exhale all the way, but I don't hear it on normal inhale/ exhale       Headache: He states the headache is mild (1-3 on a 10 point pain scale).      Mike denies having teeth pain, ageusia, diarrhea, sore throat, anosmia, facial pain or pressure, fever, cough, ear pain, and chills. He also denies having recent facial or sinus surgery in the past 60 days, double sickening (worsening symptoms after initial improvement), taking antibiotic medication for the symptoms, and having a sinus infection within the past year.   Precipitating events  He has not recently been exposed to someone with influenza. Mike has been in close contact with the following high risk individuals: immunocompromised people, adults 65 or older, pregnant women, children under the age of 5, and people with asthma, heart disease or diabetes.   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   He " either works or volunteers as a healthcare worker or a , or works or volunteers in a healthcare facility. He provides direct patient care. Additional job details as reported by the patient (free text): I'm a  with the [employer] [facility] [employer] 's Office. 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 We are also assisting our [employer] buy helping out in the [employer].   Mike has not lived in a congregate living setting in the past 14 days. He does not live with a healthcare worker.   Mike has had a close contact with a laboratory-confirmed COVID-19 patient within 14 days of symptom onset. He was exposed at his work. 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 (Walk from [employer] to the court house). Again I don't know if this was an exposure because I did not have physical contact and we both had masks on. But the inmate was in an isolation cell with suspected covid19 symptoms.   Pertinent medical history  Mike does not need a return to work/school note.   Weight: 235 lbs   Mike does not smoke or use smokeless tobacco.   Additional information as reported by the patient (free text): The last two days I have had diarrhea and vomiting. As of today my my Has been minimal, I just feel drained of energy and tired. I just want to make sure I am clear and able to work and maintain my partner safety as well as my family. This is the reason for the check.   Weight: 235 lbs    MEDICATIONS: escitalopram oxalate oral, bupropion HCl oral, ALLERGIES: Penicillins, amoxicillin, Neosporin ([id number]), Septra  Clinician Response:  Dear Mike,   Your symptoms show that you may have coronavirus (COVID-19). This illness can cause fever, cough and trouble breathing. Many people get a mild case  "and get better on their own. Some people can get very sick.  What should I do?  We would like to test you for this virus.   1. Please call [phone] to schedule your visit. Explain that you were referred by [facility] to have a COVID-19 test. Be ready to share your [facility] visit ID number.  The following will serve as your written order for this COVID Test, ordered by me, for the indication of suspected COVID [Z20.828]: The test will be ordered in [employer], our electronic health record, after you are scheduled. It will show as ordered and authorized by Yonathan Hyman MD.  Order: COVID-19 (Coronavirus) PCR for SYMPTOMATIC testing from [facility].      2. When it's time for your COVID test:  Stay at least 6 feet away from others. (If someone will drive you to your test, stay in the backseat, as far away from the  as you can.)   Cover your mouth and nose with a mask, tissue or washcloth.  Go straight to the testing site. Don't make any stops on the way there or back.      3.Starting now: Stay home and away from others (self-isolate) until:   You've had no fever---and no medicine that reduces fever---for 3 full days (72 hours). And...   Your other symptoms have gotten better. For example, your cough or breathing has improved. And...   At least 10 days have passed since your symptoms started.       During this time, don't leave the house except for testing or medical care.   Stay in your own room, even for meals. Use your own bathroom if you can.   Stay away from others in your home. No hugging, kissing or shaking hands. No visitors.  Don't go to work, school or anywhere else.    Clean \"high touch\" surfaces often (doorknobs, counters, handles, etc.). Use a household cleaning spray or wipes. You'll find a full list of  on the EPA website: www.epa.gov/pesticide-registration/list-n-disinfectants-use-against-sars-cov-2.   Cover your mouth and nose with a mask, tissue or washcloth to avoid spreading germs.  Wash your hands " and face often. Use soap and water.  Caregivers in these groups are at risk for severe illness due to COVID-19:  o People 65 years and older  o People who live in a nursing home or long-term care facility  o People with chronic disease (lung, heart, cancer, diabetes, kidney, liver, immunologic)  o People who have a weakened immune system, including those who:   Are in cancer treatment  Take medicine that weakens the immune system, such as corticosteroids  Had a bone marrow or organ transplant  Have an immune deficiency  Have poorly controlled HIV or AIDS  Are obese (body mass index of 40 or higher)  Smoke regularly   o Caregivers should wear gloves while washing dishes, handling laundry and cleaning bedrooms and bathrooms.  o Use caution when washing and drying laundry: Don't shake dirty laundry, and use the warmest water setting that you can.  o For more tips, go to www.cdc.gov/coronavirus/2019-ncov/downloads/10Things.pdf.      How can I take care of myself?   Get lots of rest. Drink extra fluids (unless a doctor has told you not to).   Take Tylenol (acetaminophen) for fever or pain. If you have liver or kidney problems, ask your family doctor if it's okay to take Tylenol.   Adults can take either:    650 mg (two 325 mg pills) every 4 to 6 hours, or...   1,000 mg (two 500 mg pills) every 8 hours as needed.    Note: Don't take more than 3,000 mg in one day. Acetaminophen is found in many medicines (both prescribed and over-the-counter medicines). Read all labels to be sure you don't take too much.   For children, check the Tylenol bottle for the right dose. The dose is based on the child's age or weight.    If you have other health problems (like cancer, heart failure, an organ transplant or severe kidney disease): Call your specialty clinic if you don't feel better in the next 2 days.       Know when to call 911. Emergency warning signs include:    Trouble breathing or shortness of breath Pain or pressure in the  chest that doesn't go away Feeling confused like you haven't felt before, or not being able to wake up Bluish-colored lips or face.  Where can I get more information?   [facility] -- About COVID-19: [email or web address]   CDC -- What to Do If You're Sick: www.cdc.gov/coronavirus/2019-ncov/about/steps-when-sick.html   CDC -- Ending Home Isolation: www.cdc.gov/coronavirus/2019-ncov/hcp/disposition-in-home-patients.html   [facility] -- Caring for Someone: www.cdc.gov/coronavirus/2019-ncov/if-you-are-sick/care-for-someone.html   [facility] -- Interim Guidance for Hospital Discharge to Home: www.health.[facility].[place]./diseases/coronavirus/hcp/hospdischarge.pdf   [facility] clinical trials (COVID-19 research studies): clinicalaffairs.[facility]-clinical-trials    Below are the COVID-19 hotlines at the [place] Department of Health ([facility]). Interpreters are available.    For health questions: Call 243-058-6815 or 1-776.256.2395 (7 a.m. to 7 p.m.) For questions about schools and childcare: Call 212-682-7560 or 1-407.722.9893 (7 a.m. to 7 p.m.)    COVID-19 (Coronavirus) General Information  Because there is currently no vaccine to prevent infection, the best way to protect yourself is to avoid being exposed to this virus. Common symptoms of COVID-19 include but are not limited to fever, cough, and shortness of breath. These symptoms appear 2-14 days after you are exposed to the virus that causes COVID-19. Click here for more information from the CDC on how to protect yourself.  If you are sick with COVID-19 or suspect you are infected with the virus that causes COVID-19, follow the steps here from the CDC to help prevent the disease from spreading to people in your home and community.  Click here for general information from the CDC on testing.  If you develop any of these emergency warning signs for COVID-19, get medical attention immediately:     Trouble breathing    Persistent pain or pressure in the chest     New confusion or inability to arouse    Bluish lips or face      Call your doctor or clinic before going in. Call 911 if you have a medical emergency and notify the  you have or think you may have COVID-19.  For more detailed and up to date information on COVID-19 (Coronavirus), please visit the CDC website.   Diagnosis: Myalgia  Diagnosis ICD: M79.1

## 2020-06-25 DIAGNOSIS — Z20.822 COVID-19 RULED OUT: Primary | ICD-10-CM

## 2020-06-25 PROCEDURE — U0003 INFECTIOUS AGENT DETECTION BY NUCLEIC ACID (DNA OR RNA); SEVERE ACUTE RESPIRATORY SYNDROME CORONAVIRUS 2 (SARS-COV-2) (CORONAVIRUS DISEASE [COVID-19]), AMPLIFIED PROBE TECHNIQUE, MAKING USE OF HIGH THROUGHPUT TECHNOLOGIES AS DESCRIBED BY CMS-2020-01-R: HCPCS | Performed by: FAMILY MEDICINE

## 2020-06-25 NOTE — LETTER
June 27, 2020        Mike Villalpando  [address] [place] [address]    This letter provides a written record that you were tested for COVID-19 on 6/25/20.       Your result was negative. This means that we didn t find the virus that causes COVID-19 in your sample. A test may show negative when you do actually have the virus. This can happen when the virus is in the early stages of infection, before you feel illness symptoms.    If you have symptoms   Stay home and away from others (self-isolate) until you meet ALL of the guidelines below:    You ve had no fever--and no medicine that reduces fever--for 3 full days (72 hours). And      Your other symptoms have gotten better. For example, your cough or breathing has improved. And     At least 10 days have passed since your symptoms started.    During this time:    Stay home. Don t go to work, school or anywhere else.     Stay in your own room, including for meals. Use your own bathroom if you can.    Stay away from others in your home. No hugging, kissing or shaking hands. No visitors.    Clean  high touch  surfaces often (doorknobs, counters, handles, etc.). Use a household cleaning spray or wipes. You can find a full list on the EPA website at www.epa.gov/pesticide-registration/list-n-disinfectants-use-against-sars-cov-2.    Cover your mouth and nose with a mask, tissue or washcloth to avoid spreading germs.    Wash your hands and face often with soap and water.    Going back to work  Check with your employer for any guidelines to follow for going back to work.    Employers: This document serves as formal notice that your employee tested negative for COVID-19, as of the testing date shown above.

## 2020-06-26 LAB
SARS-COV-2 RNA SPEC QL NAA+PROBE: NOT DETECTED
SPECIMEN SOURCE: NORMAL

## 2020-08-21 DIAGNOSIS — F41.9 ANXIETY AND DEPRESSION: ICD-10-CM

## 2020-08-21 DIAGNOSIS — F32.A ANXIETY AND DEPRESSION: ICD-10-CM

## 2020-08-21 DIAGNOSIS — F10.10 ALCOHOL ABUSE: ICD-10-CM

## 2020-08-21 NOTE — LETTER
September 17, 2020      Mike Villalpando  [address]  [facility] [place] [facility] [address]        Dear Mike,       We care about your health and have reviewed your health plan including your medical conditions, medications, and lab results.  Based on this review, it is recommended that you follow up regarding the following health topic(s):  -Depression    We recommend you take the following action(s):  -schedule a FOLLOWUP APPOINTMENT.     Please call us at the [facility] - [phone] (or use [employer]) to address the above recommendations. At thi time you are overdue for follow-up of this issue.    Your provider is currently offering virtual options for scheduling. You can schedule a video or telephone visit with her    Thank you for trusting [facility] and we appreciate the opportunity to serve you.  We look forward to supporting your healthcare needs in the future.    Healthy Regards,    Your Health Care Team  [facility]

## 2020-08-24 RX ORDER — BUPROPION HYDROCHLORIDE 300 MG/1
300 TABLET ORAL EVERY MORNING
Qty: 90 TABLET | Refills: 1 | Status: SHIPPED | OUTPATIENT
Start: 2020-08-24 | End: 2020-11-12

## 2020-08-24 RX ORDER — ESCITALOPRAM OXALATE 10 MG/1
15 TABLET ORAL DAILY
Qty: 135 TABLET | Refills: 1 | Status: SHIPPED | OUTPATIENT
Start: 2020-08-24 | End: 2020-11-12

## 2020-08-24 NOTE — TELEPHONE ENCOUNTER
Routing refill request to provider for review/approval because:  Patient needs to be seen because:  Due for 6 month mental health follow up  Elevated PHQ9    Mari Moreno RN on 8/24/2020 at 8:44 AM

## 2020-10-14 ENCOUNTER — OFFICE VISIT (OUTPATIENT)
Dept: PEDIATRICS | Facility: CLINIC | Age: 35
End: 2020-10-14
Payer: COMMERCIAL

## 2020-10-14 VITALS
HEART RATE: 84 BPM | DIASTOLIC BLOOD PRESSURE: 103 MMHG | OXYGEN SATURATION: 97 % | BODY MASS INDEX: 34.21 KG/M2 | WEIGHT: 245.3 LBS | SYSTOLIC BLOOD PRESSURE: 169 MMHG

## 2020-10-14 DIAGNOSIS — R03.0 ELEVATED BLOOD PRESSURE READING WITHOUT DIAGNOSIS OF HYPERTENSION: ICD-10-CM

## 2020-10-14 DIAGNOSIS — M25.561 ACUTE PAIN OF RIGHT KNEE: Primary | ICD-10-CM

## 2020-10-14 PROBLEM — F41.9 ANXIETY: Status: RESOLVED | Noted: 2018-06-18 | Resolved: 2020-10-14

## 2020-10-14 PROCEDURE — 99213 OFFICE O/P EST LOW 20 MIN: CPT | Mod: GE | Performed by: STUDENT IN AN ORGANIZED HEALTH CARE EDUCATION/TRAINING PROGRAM

## 2020-10-14 ASSESSMENT — ANXIETY QUESTIONNAIRES
3. WORRYING TOO MUCH ABOUT DIFFERENT THINGS: MORE THAN HALF THE DAYS
GAD7 TOTAL SCORE: 9
1. FEELING NERVOUS, ANXIOUS, OR ON EDGE: MORE THAN HALF THE DAYS
2. NOT BEING ABLE TO STOP OR CONTROL WORRYING: SEVERAL DAYS
IF YOU CHECKED OFF ANY PROBLEMS ON THIS QUESTIONNAIRE, HOW DIFFICULT HAVE THESE PROBLEMS MADE IT FOR YOU TO DO YOUR WORK, TAKE CARE OF THINGS AT HOME, OR GET ALONG WITH OTHER PEOPLE: SOMEWHAT DIFFICULT
5. BEING SO RESTLESS THAT IT IS HARD TO SIT STILL: NOT AT ALL
6. BECOMING EASILY ANNOYED OR IRRITABLE: MORE THAN HALF THE DAYS
7. FEELING AFRAID AS IF SOMETHING AWFUL MIGHT HAPPEN: MORE THAN HALF THE DAYS

## 2020-10-14 ASSESSMENT — ENCOUNTER SYMPTOMS
MYALGIAS: 0
CHANGE IN BOWEL HABIT: 0
FEVER: 0
ABDOMINAL PAIN: 0
CHILLS: 0
NAUSEA: 0
NUMBNESS: 0
ARTHRALGIAS: 0
VISUAL CHANGE: 0
COUGH: 0
DIAPHORESIS: 0
FATIGUE: 0
SORE THROAT: 0
ANOREXIA: 0
VOMITING: 0
HEADACHES: 0
NECK PAIN: 0
JOINT SWELLING: 1
VERTIGO: 0
WEAKNESS: 0
SWOLLEN GLANDS: 0

## 2020-10-14 ASSESSMENT — PATIENT HEALTH QUESTIONNAIRE - PHQ9
SUM OF ALL RESPONSES TO PHQ QUESTIONS 1-9: 5
5. POOR APPETITE OR OVEREATING: NOT AT ALL

## 2020-10-14 NOTE — LETTER
[facility]  [address] [place] [address]  [address]  [facility] [address]  [phone]  Dept: [phone]      October 14, 2020      Patient: Mike Villalpando   YOB: 1985   Date of Visit: 10/14/2020       To Whom It May Concern:    Mike Villalpando was seen and treated in our clinic on 10/14/2020.  Please excuse him from work duties from Monday, 10/12 - Friday, 10/16.           Sincerely,         Jodi Aguilar MD

## 2020-10-14 NOTE — PROGRESS NOTES
"Subjective     Mike Villalpando is a 35 year old male who presents to clinic today for the following health issues:    Musculoskeletal Problem  This is a new problem. The current episode started in the past 7 days. The problem occurs constantly. The problem has been gradually improving. Associated symptoms include joint swelling. Pertinent negatives include no abdominal pain, anorexia, arthralgias, change in bowel habit, chest pain, chills, congestion, coughing, diaphoresis, fatigue, fever, headaches, myalgias, nausea, neck pain, numbness, rash, sore throat, swollen glands, urinary symptoms, vertigo, visual change, vomiting or weakness. The symptoms are aggravated by bending, standing, walking and exertion. He has tried ice, immobilization, relaxation, rest, NSAIDs and lying down for the symptoms.            Patient stated past history of tendonitis with right knee.   Out of work for 3 days, will need a work note      Had similar pain three years ago after getting back into running, increasing mileage - woke up one morning with shooting pain up and down his right leg that eventually centered on his right knee. Went to a \"joint specialist\" at that time and was told he had tendonitis. Was advised to rest, use ice and ibuprofen - pain improved, went away within days and was back to ambulating in that time frame. Was out of work for three days during that injury. No pain since.    This time, pain started about a week ago - came on gradually, more sore over a period of 72 hours. Doesn't remember any \"popping\" noise or sensation. Again pain localized to right knee; points to just below his patella as where pain is worst. No shooting pain in rest of leg now. Four days ago developed some swelling of the knee and limited range of motion. Has been bed-ridden in last four days, a little bit better today. Has been using ice, ibuprofen, \"muscle body soak\" in bath yesterday, immobilization and rest. Current symptoms are more " stiffness than pain w/ pain localized to patella. More pain with weight bearing, ambulating. No weakness or numbness in leg or foot. He noticed some soft tissue swelling around the knee a few days ago, improved now. No warmth or erythema overlying the knee and no fevers or chills. Slowly able to increase his ROM at right knee. Took a slow, short walk yesterday and it went okay, though felt more stiff and sore after.    No recent changes in activity level - not running more miles or lifting more weight. Has been doing a lot of yard work recently but otherwise no significant overuse. No trauma to the knee from a fall or otherwise. Has been out of work this week due to injury, works as a . No history of meniscal injury or knee surgery.    High blood pressure - 169/103 in office today. Has history of elevated BP, never this high though. Has not been worked up in past, has never been on anti-hypertensive. Reports having had lots of caffeine this morning. Took ibuprofen about two hours ago. Drinks alcohol, no recent withdrawal - last drink was last night. Today denies chest pain, shortness of breath, headache, vision changes.    Review of Systems   Constitutional: Negative for chills, diaphoresis, fatigue and fever.   HENT: Negative for congestion and sore throat.    Respiratory: Negative for cough.    Cardiovascular: Negative for chest pain.   Gastrointestinal: Negative for abdominal pain, anorexia, change in bowel habit, nausea and vomiting.   Musculoskeletal: Positive for joint swelling. Negative for arthralgias, myalgias and neck pain.   Skin: Negative for rash.   Neurological: Negative for vertigo, weakness, numbness and headaches.      Constitutional, HEENT, cardiovascular, pulmonary, gi and gu systems are negative, except as otherwise noted.      Objective    BP (!) 169/103   Pulse 84   Wt 111.3 kg (245 lb 4.8 oz)   SpO2 97%   BMI 34.21 kg/m    Body mass index is 34.21 kg/m .  Physical Exam    GENERAL: generally healthy; bearing weight equally w/ cane in hand, appears somewhat uncomfortable  NECK: no adenopathy, no asymmetry, masses  RESP: lungs clear to auscultation - no rales, rhonchi or wheezes  CV: regular rate and rhythm, normal S1 S2, no S3 or S4, no murmur, click or rub  MS: right anterior knee w/ minimal soft tissue swelling relative to left, no overlying warmth or erythema, no joint effusion; no tenderness to palpation of knee joint or patella; somewhat limited passive and active ROM in right knee due to pain, stiffness        Assessment & Plan     1. Acute pain of right knee  Main concern today is right knee pain that started gradually a week ago and initially progressed to point of being bed-ridden a few days ago, now much improved and slowly ambulating again. Exam today w/o significant swelling or tenderness to knee, some limited active range of motion due to pain. With history of such, may be tendonitis, or may be other MSK pathology including meniscal injury. Reassured that it is already improving with conservative management including rest, ice, ibuprofen/actaminophen. Provided letter for to excuse from work for remainder of week and encouraged continuing management as he is at home; if not continuing to improve in coming days, advised him to reach out to us in clinic, at that time would refer to sports medicine for consideration of imaging like MRI to rule out meniscus tear, patellar dislocation/tendon tear, or other pathology.    2. Elevated blood pressure reading without diagnosis of hypertension  BP quite elevated today at 169/103 w/ history of prior elevated BP readings in clinic. Not having any acute symptoms today like headache or blurry vision, chest pain, SOB. He may have diagnosis of essential hypertension, though should be worked up with further history and labs prior to making this diagnosis so as not to miss other possible secondary causes like anxiety/pain, alcohol  "withdrawal, substance use.   - Schedule follow up BP check in 1-2 weeks  - Also needs to schedule physical exam - will have him do today before leaving, labs ordered today (CMP, lipids) for future    BMI:   Estimated body mass index is 34.21 kg/m  as calculated from the following:    Height as of 1/31/20: 1.803 m (5' 11\").    Weight as of this encounter: 111.3 kg (245 lb 4.8 oz).   Weight management plan: Patient was referred to their PCP to discuss a diet and exercise plan.       Return in about 1 month (around 11/14/2020) for Routine preventive.    Jodi Aguilar MD  [facility]    -----------    I discussed this case in depth with Dr. Aguilar. I reviewed and agree with the key components of the history, assessment, and plan. Continue conservative care for knee. No history of hypertension but borderline in the past. Suspect elevated today 2/2 pain and anxiety. Will have him recheck in a week or so.      KATERYNA Hoff MD  Internal Medicine-Pediatrics    "

## 2020-10-15 ASSESSMENT — ANXIETY QUESTIONNAIRES: GAD7 TOTAL SCORE: 9

## 2020-11-06 ENCOUNTER — ALLIED HEALTH/NURSE VISIT (OUTPATIENT)
Dept: PEDIATRICS | Facility: CLINIC | Age: 35
End: 2020-11-06
Payer: COMMERCIAL

## 2020-11-06 VITALS — SYSTOLIC BLOOD PRESSURE: 159 MMHG | DIASTOLIC BLOOD PRESSURE: 116 MMHG | OXYGEN SATURATION: 98 % | HEART RATE: 89 BPM

## 2020-11-06 DIAGNOSIS — R03.0 ELEVATED BLOOD PRESSURE READING WITHOUT DIAGNOSIS OF HYPERTENSION: Primary | ICD-10-CM

## 2020-11-06 PROCEDURE — 99207 PR NO CHARGE NURSE ONLY: CPT

## 2020-11-09 ENCOUNTER — TELEPHONE (OUTPATIENT)
Dept: PEDIATRICS | Facility: CLINIC | Age: 35
End: 2020-11-09

## 2020-11-09 NOTE — TELEPHONE ENCOUNTER
Called patient and scheduled for office visit with Lien Brown on 12 Nov.     Tiff Hernandez, EMT at 5:56 PM on November 9, 2020  [facility] Health Guide   [phone]

## 2020-11-09 NOTE — TELEPHONE ENCOUNTER
Called patient as requested after nurse only for BP check, patient need a follow-up appointment. No answer, LVM requesting a call back.    Tiff Hernandez, EMT at 1:07 PM on November 9, 2020  [facility] Health Guide   [phone]

## 2020-11-12 ENCOUNTER — OFFICE VISIT (OUTPATIENT)
Dept: PEDIATRICS | Facility: CLINIC | Age: 35
End: 2020-11-12
Payer: COMMERCIAL

## 2020-11-12 VITALS
WEIGHT: 250.2 LBS | HEART RATE: 96 BPM | DIASTOLIC BLOOD PRESSURE: 114 MMHG | OXYGEN SATURATION: 99 % | SYSTOLIC BLOOD PRESSURE: 160 MMHG | HEIGHT: 71 IN | RESPIRATION RATE: 13 BRPM | BODY MASS INDEX: 35.03 KG/M2 | TEMPERATURE: 97.8 F

## 2020-11-12 DIAGNOSIS — F10.10 ALCOHOL ABUSE: ICD-10-CM

## 2020-11-12 DIAGNOSIS — F41.9 ANXIETY AND DEPRESSION: ICD-10-CM

## 2020-11-12 DIAGNOSIS — F32.A ANXIETY AND DEPRESSION: ICD-10-CM

## 2020-11-12 DIAGNOSIS — I10 BENIGN ESSENTIAL HYPERTENSION: Primary | ICD-10-CM

## 2020-11-12 PROCEDURE — 99213 OFFICE O/P EST LOW 20 MIN: CPT | Performed by: NURSE PRACTITIONER

## 2020-11-12 RX ORDER — PROPRANOLOL HYDROCHLORIDE 20 MG/1
20 TABLET ORAL 2 TIMES DAILY
Qty: 60 TABLET | Refills: 1 | Status: SHIPPED | OUTPATIENT
Start: 2020-11-12 | End: 2021-02-24

## 2020-11-12 RX ORDER — BUPROPION HYDROCHLORIDE 300 MG/1
300 TABLET ORAL EVERY MORNING
Qty: 90 TABLET | Refills: 1 | Status: SHIPPED | OUTPATIENT
Start: 2020-11-12 | End: 2021-05-12

## 2020-11-12 RX ORDER — ESCITALOPRAM OXALATE 10 MG/1
15 TABLET ORAL DAILY
Qty: 135 TABLET | Refills: 1 | Status: SHIPPED | OUTPATIENT
Start: 2020-11-12 | End: 2021-03-09

## 2020-11-12 ASSESSMENT — MIFFLIN-ST. JEOR: SCORE: 2087.03

## 2020-11-12 NOTE — PROGRESS NOTES
"Subjective     Mike Villalpando is a 35 year old male who presents to clinic today for the following health issues:    HPI         Hypertension Follow-up      Do you check your blood pressure regularly outside of the clinic? No     Are you following a low salt diet? No    Are your blood pressures ever more than 140 on the top number (systolic) OR more than 90 on the bottom number (diastolic), for example 140/90? Yes      How many servings of fruits and vegetables do you eat daily?  2-3    On average, how many sweetened beverages do you drink each day (Examples: soda, juice, sweet tea, etc.  Do NOT count diet or artificially sweetened beverages)?   0    How many days per week do you exercise enough to make your heart beat faster? 3 or less    How many minutes a day do you exercise enough to make your heart beat faster? 9 or less    How many days per week do you miss taking your medication? 0    Alcohol use:  -6 pack daily  -was prescribed naltrexone 1 year ago but has not taken it.  -use alcohol for anxiety and depression symptoms    Anxiety and depression:  -on Wellbutrin and Lexapro  -Anxiety makes it difficult to sleep  -works in law enforcement  -unsure if he has PTSD--unsure if would like to be referred to psychiatry/psychology    High blood pressure:  -has had high blood pressure over a couple of years.  -contributes this to alcohol use and anxiety  BP Readings from Last 3 Encounters:   11/12/20 (!) 160/114   11/06/20 (!) 159/116   10/14/20 (!) 169/103     Patient has several appointments coming up--labs and IM appt.    Review of Systems   Constitutional, HEENT, cardiovascular, pulmonary, gi and gu systems are negative, except as otherwise noted.      Objective    BP (!) 160/114 (Cuff Size: Adult Large)   Pulse 96   Temp 97.8  F (36.6  C) (Tympanic)   Resp 13   Ht 1.803 m (5' 11\")   Wt 113.5 kg (250 lb 3.2 oz)   SpO2 99%   BMI 34.90 kg/m    Body mass index is 34.9 kg/m .       Physical Exam   GENERAL: " healthy, alert and no distress  RESP: lungs clear to auscultation - no rales, rhonchi or wheezes  CV: regular rate and rhythm, normal S1 S2, no S3 or S4, no murmur, click or rub, no peripheral edema and peripheral pulses strong  NEURO: Normal strength and tone, mentation intact and speech normal  PSYCH: mentation appears normal, affect normal/bright      Assessment & Plan     1. Benign essential hypertension  Will start on propranolol to help with hypertension and anxiety  Will plan to increase propanolol based on blood pressure readings  Follow-up in 2 weeks for HTN follow-up and management  - propranolol (INDERAL) 20 MG tablet; Take 1 tablet (20 mg) by mouth 2 times daily  Dispense: 60 tablet; Refill: 1    2. Alcohol abuse  Encouraged patient to use naltrexone for alcohol abuse  Liver US ordered  - US Abdomen Complete  - escitalopram (LEXAPRO) 10 MG tablet; Take 1.5 tablets (15 mg) by mouth daily  Dispense: 135 tablet; Refill: 1    3. Anxiety and depression  Patient to reach out to therapy/counseling/psychiatry for his symptoms  At this time he did not want a referral for psychiatry from the clinic  - buPROPion (WELLBUTRIN XL) 300 MG 24 hr tablet; Take 1 tablet (300 mg) by mouth every morning  Dispense: 90 tablet; Refill: 1  - escitalopram (LEXAPRO) 10 MG tablet; Take 1.5 tablets (15 mg) by mouth daily  Dispense: 135 tablet; Refill: 1        See Patient Instructions  Return in about 19 days (around 12/1/2020) for Follow up: Blood pressure.    Lien Brown NP  [facility]

## 2020-11-20 ENCOUNTER — TELEPHONE (OUTPATIENT)
Dept: PEDIATRICS | Facility: CLINIC | Age: 35
End: 2020-11-20

## 2020-11-20 NOTE — PROGRESS NOTES
"Mike Villalpando is a 35 year old patient who comes in today for a Blood Pressure check.  Initial BP:  BP (!) 150/110   Pulse 84   SpO2 98%      84  Disposition: results routed to provider      Patient not prescribe hypertension medication at this time pending work up to diagnosis per last OV 10/14/20.    Patient reported no symptoms. Very high anxiety and on edge, stressful work. Also re- injured knee.       Patient stated that he is \" talking to someone\"  For help with anxiety and PTSD.       Three BP reading were taking today:    1) 162/100  P:  116-89  Right arm     2) 150/110 P: 90-84  Right arm     3)  159/116  P: 89  Left arm with machine        Clair Ashby MA// November 6, 2020 4:56 PM          "
BP Readings from Last 6 Encounters:   11/06/20 (!) 159/116   10/14/20 (!) 169/103   01/31/20 (!) 142/84   11/14/19 124/82   11/11/19 (!) 144/88   09/10/19 (!) 158/98     Reviewed BP - I'm really glad he's working on the other things but I do think he has underlying high blood pressure that we should treat. It is at a level that I do strongly recommend a medication.     Please schedule a visit (can be virtual or in person) with myself or an extender to talk about different options. Please keep his other appointment that he has with a resident in December.     KATERYNA Hoff MD  Internal Medicine-Pediatrics    
This writer left a voice message per note below patient needs an office visit for Blood Pressure.      Please help patient schedule: office visit with Martha- Bleil or extender  For high Blood pressure  When he calls back.     Thanks     Clair Ashby MA// November 20, 2020 12:10 PM              
61

## 2020-11-20 NOTE — TELEPHONE ENCOUNTER
This writer left a voice message per [facility] ( nurse only visit  below patient needs an office visit for Blood Pressure.      Please help patient schedule follow up: office visit with Martha- Bleil or extender  For high Blood pressure  When he calls back.         Thanks     Clair Ashby MA// November 20, 2020 12:10 PM

## 2020-12-16 DIAGNOSIS — R03.0 ELEVATED BLOOD PRESSURE READING WITHOUT DIAGNOSIS OF HYPERTENSION: ICD-10-CM

## 2020-12-16 LAB
ALBUMIN SERPL-MCNC: 4.3 G/DL (ref 3.4–5)
ALP SERPL-CCNC: 66 U/L (ref 40–150)
ALT SERPL W P-5'-P-CCNC: 93 U/L (ref 0–70)
ANION GAP SERPL CALCULATED.3IONS-SCNC: 2 MMOL/L (ref 3–14)
AST SERPL W P-5'-P-CCNC: 44 U/L (ref 0–45)
BILIRUB SERPL-MCNC: 0.6 MG/DL (ref 0.2–1.3)
BUN SERPL-MCNC: 10 MG/DL (ref 7–30)
CALCIUM SERPL-MCNC: 9.4 MG/DL (ref 8.5–10.1)
CHLORIDE SERPL-SCNC: 105 MMOL/L (ref 94–109)
CHOLEST SERPL-MCNC: 294 MG/DL
CO2 SERPL-SCNC: 31 MMOL/L (ref 20–32)
CREAT SERPL-MCNC: 0.87 MG/DL (ref 0.66–1.25)
GFR SERPL CREATININE-BSD FRML MDRD: >90 ML/MIN/{1.73_M2}
GLUCOSE SERPL-MCNC: 96 MG/DL (ref 70–99)
HDLC SERPL-MCNC: 52 MG/DL
LDLC SERPL CALC-MCNC: 179 MG/DL
NONHDLC SERPL-MCNC: 242 MG/DL
POTASSIUM SERPL-SCNC: 4.1 MMOL/L (ref 3.4–5.3)
PROT SERPL-MCNC: 8.1 G/DL (ref 6.8–8.8)
SODIUM SERPL-SCNC: 138 MMOL/L (ref 133–144)
TRIGL SERPL-MCNC: 315 MG/DL

## 2020-12-16 PROCEDURE — 36415 COLL VENOUS BLD VENIPUNCTURE: CPT | Performed by: INTERNAL MEDICINE

## 2020-12-16 PROCEDURE — 80053 COMPREHEN METABOLIC PANEL: CPT | Performed by: INTERNAL MEDICINE

## 2020-12-16 PROCEDURE — 80061 LIPID PANEL: CPT | Performed by: INTERNAL MEDICINE

## 2020-12-17 ENCOUNTER — TELEPHONE (OUTPATIENT)
Dept: PEDIATRICS | Facility: CLINIC | Age: 35
End: 2020-12-17

## 2020-12-17 NOTE — TELEPHONE ENCOUNTER
Called patient to assist with getting blood pressure re-checked and lab results.     No answer, LVM requesting a call back.     Tiff Hernandez, EMT at 3:36 PM on December 17, 2020  [facility] Health Guide   [phone]

## 2020-12-17 NOTE — TELEPHONE ENCOUNTER
Patient returned, gave information regarding labs per Dr. Kylie Hoff.     Patient stated he would call to schedule liver US and walk-in to the pharmacy to get his blood pressure re-checked.     Patient had no further questions or concerns.     Tiff Hernandez, EMT at 4:15 PM on December 17, 2020  [facility] Health Guide   [phone]

## 2020-12-27 ENCOUNTER — HEALTH MAINTENANCE LETTER (OUTPATIENT)
Age: 35
End: 2020-12-27

## 2021-02-22 DIAGNOSIS — I10 BENIGN ESSENTIAL HYPERTENSION: ICD-10-CM

## 2021-02-22 NOTE — LETTER
[facility]  [address]  [place] [address]  [phone]      March 8, 2021    Mike Villalpando                                                                                                                                                       [address] [facility] [address]              Dear Mike,      You are currently due for a physical with a provider here at the [facility].   We have refilled your medication propranolol (INDERAL) 20 MG tablet. You will need to have a follow up in order to get further refills.     Please call the clinic to set up an appointment. Please let us know if you have any questions or concerns.     Have a great day!           Sincerely,  [facility] Support Staff

## 2021-02-23 NOTE — TELEPHONE ENCOUNTER
Routing refill request to provider for review/approval because:  Labs out of range:  BP, no follow up found  Sravanthi Quinn RN, BSN  Message handled by CLINIC NURSE.

## 2021-02-24 RX ORDER — PROPRANOLOL HYDROCHLORIDE 20 MG/1
20 TABLET ORAL 2 TIMES DAILY
Qty: 60 TABLET | Refills: 1 | Status: SHIPPED | OUTPATIENT
Start: 2021-02-24 | End: 2021-04-05

## 2021-02-24 NOTE — TELEPHONE ENCOUNTER
Refill sent.   Needs follow up.   Please call and put on resident schedule.      Betty Preston MD  Internal Medicine - Pediatrics

## 2021-02-25 NOTE — TELEPHONE ENCOUNTER
Called and LVM for the patient to call the clinic back.  Upon call back please schedule an appointment with our Residents.     Julee Wang

## 2021-03-08 NOTE — TELEPHONE ENCOUNTER
Called patient- no answer. Lvm to call clinic regarding appointment. Sent letter.     Tami Gonzalez MA

## 2021-03-09 ENCOUNTER — OFFICE VISIT (OUTPATIENT)
Dept: PEDIATRICS | Facility: CLINIC | Age: 36
End: 2021-03-09
Payer: COMMERCIAL

## 2021-03-09 VITALS
HEART RATE: 90 BPM | SYSTOLIC BLOOD PRESSURE: 136 MMHG | HEIGHT: 71 IN | BODY MASS INDEX: 35.76 KG/M2 | WEIGHT: 255.4 LBS | TEMPERATURE: 97.4 F | RESPIRATION RATE: 14 BRPM | OXYGEN SATURATION: 98 % | DIASTOLIC BLOOD PRESSURE: 90 MMHG

## 2021-03-09 DIAGNOSIS — F41.9 ANXIETY AND DEPRESSION: Primary | ICD-10-CM

## 2021-03-09 DIAGNOSIS — F41.9 ANXIETY: ICD-10-CM

## 2021-03-09 DIAGNOSIS — R45.851 SUICIDAL IDEATION: ICD-10-CM

## 2021-03-09 DIAGNOSIS — F10.10 ALCOHOL ABUSE: ICD-10-CM

## 2021-03-09 DIAGNOSIS — I10 BENIGN ESSENTIAL HYPERTENSION: ICD-10-CM

## 2021-03-09 DIAGNOSIS — F32.A ANXIETY AND DEPRESSION: Primary | ICD-10-CM

## 2021-03-09 PROCEDURE — 99214 OFFICE O/P EST MOD 30 MIN: CPT | Performed by: NURSE PRACTITIONER

## 2021-03-09 PROCEDURE — 96127 BRIEF EMOTIONAL/BEHAV ASSMT: CPT | Performed by: NURSE PRACTITIONER

## 2021-03-09 RX ORDER — ESCITALOPRAM OXALATE 20 MG/1
20 TABLET ORAL DAILY
Qty: 30 TABLET | Refills: 0 | Status: SHIPPED | OUTPATIENT
Start: 2021-03-09 | End: 2021-05-12

## 2021-03-09 ASSESSMENT — ANXIETY QUESTIONNAIRES
7. FEELING AFRAID AS IF SOMETHING AWFUL MIGHT HAPPEN: MORE THAN HALF THE DAYS
6. BECOMING EASILY ANNOYED OR IRRITABLE: NEARLY EVERY DAY
2. NOT BEING ABLE TO STOP OR CONTROL WORRYING: SEVERAL DAYS
IF YOU CHECKED OFF ANY PROBLEMS ON THIS QUESTIONNAIRE, HOW DIFFICULT HAVE THESE PROBLEMS MADE IT FOR YOU TO DO YOUR WORK, TAKE CARE OF THINGS AT HOME, OR GET ALONG WITH OTHER PEOPLE: SOMEWHAT DIFFICULT
3. WORRYING TOO MUCH ABOUT DIFFERENT THINGS: NOT AT ALL
5. BEING SO RESTLESS THAT IT IS HARD TO SIT STILL: SEVERAL DAYS
GAD7 TOTAL SCORE: 10
1. FEELING NERVOUS, ANXIOUS, OR ON EDGE: MORE THAN HALF THE DAYS

## 2021-03-09 ASSESSMENT — PATIENT HEALTH QUESTIONNAIRE - PHQ9
SUM OF ALL RESPONSES TO PHQ QUESTIONS 1-9: 7
5. POOR APPETITE OR OVEREATING: SEVERAL DAYS

## 2021-03-09 ASSESSMENT — MIFFLIN-ST. JEOR: SCORE: 2105.62

## 2021-03-09 NOTE — Clinical Note
Doug Puente- I saw this pt for anxiety/depression and he also has hx of alcohol abuse. He is not having suicidal ideation which is new for him. He is open to exploring IOP so I put in a referral for him. He has met with therapist in the past and is looking into possibly finding another through work, (he is in law enforcement) would you be willing to reach out to him in the mean time? Thanks!

## 2021-03-09 NOTE — PATIENT INSTRUCTIONS
1. Increase the dose of lexapro to 20 mg (you can finish out the pills you have at home and take x2 of the 10 mg tabs, the refill at your pharmacy will be 20 mg tabs going forward)  2. Continue with blood pressure medication.  3. I will talk with Care Coordination about what an IOP (intensive outpatient program) options would look like.   4. Follow-up in 3-4 weeks

## 2021-03-09 NOTE — PROGRESS NOTES
Assessment & Plan     Anxiety and depression  Alcohol abuse  Anxiety  Suicidal ideation  Pt having both significant anxiety and depression. He is now missing days of work and ETOH use to cope with his mood, ongoing for the past 1 year at least.  He has SI but denies any plan and we discussed safety plan. This is a concern as he works as  and has access to guns at home/work, he assures me he feels safe. Feels he could talk to his wife if he ever felt unsafe. In the past pt has been hesitant to enroll in IOP but he feels open to this today and would like more information/committment/time/details. Referral placed. Will also have [facility] to reach out to pt in mean time. He is not interested in naltrexone or another addiction medicine referral. Increased lexapro to 20 mg today (max dose), reviewed black box warning and risk of suicide but this is minimal increase in med today. Needs close f/u in 1 mos due to severity of symptoms.  - escitalopram (LEXAPRO) 20 MG tablet; Take 1 tablet (20 mg) by mouth daily  - MENTAL HEALTH REFERRAL  - Adult; Outpatient Treatment; Day Treatment/Dual Disorder/Partial Hospitalization Program - Assess & Treat; [facility]: Day Treatment (MH / Dual / 55+) [phone]; We will contact you to schedule the appointment or please call...    Benign essential hypertension  Improved since started propranolol, DBP not at goal but no med changes made. OK to continue (not first line for HTN but may help with anxiety). Feel anxiety could be contributing today in clinic.    Depression Screening Follow Up    PHQ 3/9/2021   PHQ-9 Total Score 7   Q9: Thoughts of better off dead/self-harm past 2 weeks Several days   F/U: Thoughts of suicide or self-harm -   F/U: Self harm-plan -   F/U: Self-harm action -   F/U: Safety concerns -     Last PHQ-9 3/9/2021   1.  Little interest or pleasure in doing things 2   2.  Feeling down, depressed, or hopeless 1   3.  Trouble falling or staying asleep, or sleeping too  much 0   4.  Feeling tired or having little energy 1   5.  Poor appetite or overeating 1   6.  Feeling bad about yourself 1   7.  Trouble concentrating 0   8.  Moving slowly or restless 0   Q9: Thoughts of better off dead/self-harm past 2 weeks 1   PHQ-9 Total Score 7   Difficulty at work, home, or with people Somewhat difficult   In the past two weeks have you had thoughts of suicide or self harm? -   Do you have concerns about your personal safety or the safety of others? -   In the past 2 weeks have you thought about a plan or had intention to harm yourself? -   In the past 2 weeks have you acted on these thoughts in any way? -             Follow Up      Follow Up Actions Taken  Scheduled appointment with [facility]  Mental Health Referral placed    Discussed the following ways the patient can remain in a safe environment:  remove access to firearms: can't remove this at work but recommend at home  Patient Safety Assessment:    After gathering the above information, Mike presents the following high risk factors for suicide: male, recent substance abuse and panic,extreme anxiety.  Mike denies current fears or concerns for personal safety.    Mike has the following Protective Factors: Children in the home  and Positive social support     Upon review of the patient interview, identification of the above factors, safety strategy alternatives, and treatment plan interventions: Referred patient to: Individual Therapist.       Return in about 1 month (around 4/9/2021) for Routine Visit.    Libby Damon NP  [facility] BERTHA Yao is a 36 year old who presents for the following health issues     HPI   Hypertension Follow-up      Do you check your blood pressure regularly outside of the clinic? No     Are you following a low salt diet? No    Are your blood pressures ever more than 140 on the top number (systolic) OR more   than 90 on the bottom number (diastolic), for example 140/90?  Yes    Depression and Anxiety Follow-Up    How are you doing with your depression since your last visit? Worsened     How are you doing with your anxiety since your last visit?  Worsened     Are you having other symptoms that might be associated with depression or anxiety? No    Have you had a significant life event? No     Do you have any concerns with your use of alcohol or other drugs? Yes:  self medicates with alcohol    Social History     Tobacco Use     Smoking status: Never Smoker     Smokeless tobacco: Former User     Types: Chew   Substance Use Topics     Alcohol use: Yes     Comment: 2 times per week, 1-6 per time     Drug use: No     PHQ 1/31/2020 10/14/2020 3/9/2021   PHQ-9 Total Score 7 5 7   Q9: Thoughts of better off dead/self-harm past 2 weeks Several days Not at all Several days   F/U: Thoughts of suicide or self-harm Yes - -   F/U: Self harm-plan No - -   F/U: Self-harm action No - -   F/U: Safety concerns No - -     JOSE ANGEL-7 SCORE 1/31/2020 10/14/2020 3/9/2021   Total Score 7 (mild anxiety) - -   Total Score 7 9 10     Last PHQ-9 3/9/2021   1.  Little interest or pleasure in doing things 2   2.  Feeling down, depressed, or hopeless 1   3.  Trouble falling or staying asleep, or sleeping too much 0   4.  Feeling tired or having little energy 1   5.  Poor appetite or overeating 1   6.  Feeling bad about yourself 1   7.  Trouble concentrating 0   8.  Moving slowly or restless 0   Q9: Thoughts of better off dead/self-harm past 2 weeks 1   PHQ-9 Total Score 7   Difficulty at work, home, or with people Somewhat difficult   In the past two weeks have you had thoughts of suicide or self harm? -   Do you have concerns about your personal safety or the safety of others? -   In the past 2 weeks have you thought about a plan or had intention to harm yourself? -   In the past 2 weeks have you acted on these thoughts in any way? -     JOSE ANGEL-7  3/9/2021   1. Feeling nervous, anxious, or on edge 2   2. Not being able  "to stop or control worrying 1   3. Worrying too much about different things 0   4. Trouble relaxing 1   5. Being so restless that it is hard to sit still 1   6. Becoming easily annoyed or irritable 3   7. Feeling afraid, as if something awful might happen 2   JOSE ANGEL-7 Total Score 10   If you checked any problems, how difficult have they made it for you to do your work, take care of things at home, or get along with other people? Somewhat difficult       Pt works in law enforcement.  Had sudden onset of anxiety about 1.5 years ago, this was totally new for him so he didn't recognize this as anxiety.  Initially started lexapro 11/18/19, added wellbutrin 1/3/19. Initially felt improvement of mood with this but now notes mood changes/worsening over the past several mos.   Feels depressed, wants to stay in bed, not finding fabienne/passion in his job like he used to. Sometimes finds himself calling into work just so he can be alone. He also feels anxious, has a hard time falling asleep.  He tells me he feels safe but has suicidal ideation, some passive thoughts. Has no plan. Admits he does have access to guns but doesn't feel he would use this.   Lives with wife and son. Feels supported from wife.  Taking meds as prescribed  Tried therapist through work about 2 years ago, initially liked therapy but stopped. Plans to reach out to new therapist from friend that works with police officers.  He also admits he regularly drinks alcohol, typical 6 light beers in the evenings after his son goes to sleep. Drinks alcohol to relax.   Wife is concerned about his alcohol intake, \"doesn't like it but she understands why I drink\"  Saw addiction medicine 11/19 but didn't feel it was a good fit. Has also been prescribed naltrexone but never started this.    Review of Systems   Constitutional, HEENT, cardiovascular, pulmonary, GI, , musculoskeletal, neuro, skin, endocrine and psych systems are negative, except as otherwise noted.    " "  Objective    BP (!) 136/90 (BP Location: Right arm, Cuff Size: Adult Large)   Pulse 90   Temp 97.4  F (36.3  C) (Tympanic)   Resp 14   Ht 1.803 m (5' 11\")   Wt 115.8 kg (255 lb 6.4 oz)   SpO2 98%   BMI 35.62 kg/m    Body mass index is 35.62 kg/m .  Physical Exam   GENERAL: healthy, alert  PSYCH: anxious              "

## 2021-03-10 ASSESSMENT — ANXIETY QUESTIONNAIRES: GAD7 TOTAL SCORE: 10

## 2021-03-11 ENCOUNTER — TELEPHONE (OUTPATIENT)
Dept: BEHAVIORAL HEALTH | Facility: CLINIC | Age: 36
End: 2021-03-11

## 2021-03-11 NOTE — TELEPHONE ENCOUNTER
Attempted to call patient after receiving a referral from their PCP. Message left with a contact number for them to call back.    Contacted patient due to referral by their PCP for potential [facility] services. C introduced herself and her role within the clinic. Patient is in agreement with meeting and an appointment was scheduled for 03/25/2021 at 17:15. Patient denies any other needs or safety concerns at this time and was provided with contact information for this [facility].    Contacted patient and had to reschedule appointment to 03/26/2021 due to a conflict. Patient is in agreement.    [facility] Clinician

## 2021-03-26 ENCOUNTER — VIRTUAL VISIT (OUTPATIENT)
Dept: BEHAVIORAL HEALTH | Facility: CLINIC | Age: 36
End: 2021-03-26
Payer: COMMERCIAL

## 2021-03-26 DIAGNOSIS — F33.1 MODERATE EPISODE OF RECURRENT MAJOR DEPRESSIVE DISORDER (H): Primary | ICD-10-CM

## 2021-03-26 PROCEDURE — 90834 PSYTX W PT 45 MINUTES: CPT | Mod: GT | Performed by: SOCIAL WORKER

## 2021-03-26 NOTE — PROGRESS NOTES
Telemedicine Visit: The patient's condition can be safely assessed and treated via synchronous audio and visual telemedicine encounter.      Reason for Telemedicine Visit: Services only offered telehealth    Originating Site (Patient Location): Patient's home    Distant Site (Provider Location): [facility]: [place]    Consent:  The patient/guardian has verbally consented to: the potential risks and benefits of telemedicine (video visit) versus in person care; bill my insurance or make self-payment for services provided; and responsibility for payment of non-covered services.     Mode of Communication:  Video Conference via [email or web address]    As the provider I attest to compliance with applicable laws and regulations related to telemedicine.    [facility] [place] [facility]: Integrated Behavioral Health  March 26, 2021    Behavioral Health Clinician Progress Note    Patient Name: Mike Villalpando       Service Type:  Individual      Service Location:   Phone call (patient / identified key support person reached)     Session Start Time: 17:20  Session End Time: 18:00      Session Length: 38 - 52      Attendees: Patient    Visit Activities (Refresh list every visit): NEW and [facility] Only    Diagnostic Assessment Date: Pending  Treatment Plan Review Date: Pending  See Flowsheets for today's PHQ-9 and JOSE ANGEL-7 results  Previous PHQ-9:   PHQ-9 SCORE 1/31/2020 10/14/2020 3/9/2021   PHQ-9 Total Score MyChart 7 (Mild depression) - -   PHQ-9 Total Score 7 5 7     Previous JOSE ANGEL-7:   JOSE ANGEL-7 SCORE 1/31/2020 10/14/2020 3/9/2021   Total Score 7 (mild anxiety) - -   Total Score 7 9 10     NATE LEVEL:  NATE Score (Last Two) 1/31/2020   NATE Raw Score 32   Activation Score 62.6   NATE Level 3     DATA  Extended Session (60+ minutes): No  Interactive Complexity: No  Crisis: No  BH Patient: No    Treatment Objective(s) Addressed in This Session:  Target Behavior(s): disease management/lifestyle changes related to  "depression    Depressed Mood: Increase interest, engagement, and pleasure in doing things  Decrease frequency and intensity of feeling down, depressed, hopeless  Feel less tired and more energy during the day   Identify negative self-talk and behaviors: challenge core beliefs, myths, and actions  Improve concentration, focus, and mindfulness in daily activities     Current Stressors / Issues:  Patient reports he started noticing symptoms of anxiety approximately 2 years ago.  Patient became increasingly frustrated with his job and with the world in general.  Patient describes that he was unable to sleep, eat, or relax.  Patient felt very restless and was pacing all the time.  Patient admits he was not sure what it was and saw his PCP who explained that he was experiencing anxiety.  Patient did not take medication at that time.  Patient then noticed that his anxiety changed into depression.  Patient started having \"dark thoughts.\"  Patient worried that this would continue to get worse so he saw his doctor and was started on medication.  Patient has tried different types of medications over the past couple of years and feels that what he is currently prescribed is helping.  Patient describes that his depression goes through \"waves\" where he feels extremely sad, down, anxious, lacks energy and is uncomfortable.  Patient admits that he did have suicidal thoughts just over a year ago but denied that he had a plan or intent.  Patient denies any history of attempts or self-harm.  Patient admits it is hard for him to be honest about this but recognizes that he does need to receive help.  Patient reports an increase in anger since last year, especially regarding his job.  Patient feels he is safe at this time and is open to trying therapy to see if it could assist.  Patient briefly met with his [facility]AP program about a year ago and felt it was helpful.  Patient admits an increase in alcohol use to try to cope with his anxiety and " depression  Is still able to function with employment and in his life.  [facility] validated patient for his experience.  [facility] discussed patient being told previously he may have PTSD and explained how this could be linked to secondary trauma due to his employment.  [facility] assessed for safety and encouraged patient to access supports if needed.  [facility] discussed a plan for patient to start working on alcohol reduction without feeling reactionary emotions.  [facility] provided encouragement.    Progress on Treatment Objective(s) / Homework:  New Objective established this session - PREPARATION (Decided to change - considering how); Intervened by negotiating a change plan and determining options / strategies for behavior change, identifying triggers, exploring social supports, and working towards setting a date to begin behavior change    Motivational Interviewing    MI Intervention: Expressed Empathy/Understanding, Supported Autonomy, Collaboration, Evocation, Open-ended questions and Reflections: simple and complex     Change Talk Expressed by the Patient: Desire to change Ability to change Reasons to change Need to change Committment to change    Provider Response to Change Talk: E - Evoked more info from patient about behavior change, A - Affirmed patient's thoughts, decisions, or attempts at behavior change, R - Reflected patient's change talk and S - Summarized patient's change talk statements    Also provided psychoeducation about behavioral health condition, symptoms, and treatment options    Care Plan review completed: No    Medication Review:  No changes to current psychiatric medication(s)    Medication Compliance:  Yes    Changes in Health Issues:   None reported    Chemical Use Review:   Substance Use: increase in alcohol .  Patient reports frequency of use several days a week.  Provided encouragement towards sobriety  Provided support and affirmation for steps taken towards sobriety         Tobacco Use: No current tobacco  use.      Assessment: Current Emotional / Mental Status (status of significant symptoms):  Risk status (Self / Other harm or suicidal ideation)  Patient has had a history of suicidal ideation: Patient reports he started having suicidal ideation over a year ago but no plan, intent or attempts.  Patient denies current fears or concerns for personal safety.  Patient denies current or recent suicidal ideation or behaviors.  Patient denies current or recent homicidal ideation or behaviors.  Patient denies current or recent self injurious behavior or ideation.  Patient denies other safety concerns.  A safety and risk management plan has not been developed at this time, however patient was encouraged to call [place] [phone] should there be a change in any of these risk factors.    Appearance:   Not able to assess over phone   Eye Contact:   Not able to assess over phone   Psychomotor Behavior: Not able to assess over phone   Attitude:   Cooperative  Interested  Orientation:   All  Speech   Rate / Production: Normal    Volume:  Normal   Mood:    Anxious  Depressed   Affect:    Appropriate   Thought Content:  Clear   Thought Form:  Coherent  Logical   Insight:    Good     Diagnoses:  1. Moderate episode of recurrent major depressive disorder (H)      Collateral Reports Completed:  Not Applicable    Plan: (Homework, other):  Patient was given information about behavioral services and encouraged to schedule a follow up appointment with the clinic [facility] in 1 week.  He was also given information about mental health symptoms and treatment options .  CD Recommendations: No indications of CD issues.    Cindi Bowman, JESU  March 26, 2021

## 2021-04-02 ENCOUNTER — VIRTUAL VISIT (OUTPATIENT)
Dept: BEHAVIORAL HEALTH | Facility: CLINIC | Age: 36
End: 2021-04-02
Payer: COMMERCIAL

## 2021-04-02 DIAGNOSIS — F33.1 MODERATE EPISODE OF RECURRENT MAJOR DEPRESSIVE DISORDER (H): Primary | ICD-10-CM

## 2021-04-02 PROCEDURE — 90837 PSYTX W PT 60 MINUTES: CPT | Mod: TEL | Performed by: SOCIAL WORKER

## 2021-04-02 NOTE — PROGRESS NOTES
"Pre-Visit Planning   Next 5 appointments (look out 90 days)    May 06, 2021  4:20 PM  (Arrive by 4:00 PM)  Adult Preventative Visit with MARIA FERNANDA Apodaca CNP  [facility] ([facility] ) [address]  [address]  [place] [address] [phone]        Appointment Notes for this encounter:   fu depression/anxiety    Questionnaires Reviewed/Assigned  Additional questionnaires assigned: PHQ-9 & JOSE ANGEL-7     Patient preferred phone number: [phone]    Spoke to patient via phone. Patient does not have additional questions or concerns.        Visit is not preventive.    Patient is established.    Patient reminded of date and time. Barriers addressed: Pt denies any barriers at this time  Chief complaint confirmed.  Clarified visit purpose: Pt aware  Checked for changes of symptoms or new symptoms: Pt denies  Any additional needs (transportation, financial, mobility)? Pt reports no needs at this time.    Health Maintenance Due   Topic Date Due     PREVENTIVE CARE VISIT  Never done     ANNUAL REVIEW OF HM ORDERS  Never done     ADVANCE CARE PLANNING  Never done     HIV SCREENING  Never done     HEPATITIS C SCREENING  Never done     COVID-19 Vaccine (2 - Moderna 2-dose series) 04/07/2021     Patient is due for:  preventive care visit  Appointment scheduled.    [employer]  Patient is active on [employer].    Questionnaire Review   Offered information on completing questionnaires via [employer].    Call Summary  \"Thank you for your time today.  If anything comes up before your appointment, please feel free to contact us at [phone].\"    Lyndon Gray, EMT at 11:45 AM on April 2, 2021   Clinic Health Guide   [phone]    "

## 2021-04-02 NOTE — PROGRESS NOTES
"Session switched to telephone as patient was unable to get video to work.    The patient has been notified of the following:      \"We have found that certain health care needs can be provided without the need for a face to face visit.  This service lets us provide the care you need with a phone conversation.       I will have full access to your [place] medical record during this entire phone call.   I will be taking notes for your medical record.      Since this is like an office visit, we will bill your insurance company for this service.       There are potential benefits and risks of telephone visits (e.g. limits to patient confidentiality) that differ from in-person visits.?  Confidentiality still applies for telephone services, and nobody will record the visit.  It is important to be in a quiet, private space that is free of distractions (including cell phone or other devices) during the visit.??      If during the course of the call I believe a telephone visit is not appropriate, you will not be charged for this service\"     Consent has been obtained for this service by care team member: Yes     [facility]: Integrated Behavioral Health  April 2, 2021    Behavioral Health Clinician Progress Note    Patient Name: Mike Villalpando       Service Type:  Individual      Service Location:   Phone call (patient / identified key support person reached)     Session Start Time: 17:15  Session End Time: 18:15      Session Length: 53 - 60      Attendees: Patient    Visit Activities (Refresh list every visit): [facility] Only    Diagnostic Assessment Date: Pending  Treatment Plan Review Date: Pending  See Flowsheets for today's PHQ-9 and JOSE ANGEL-7 results  Previous PHQ-9:   PHQ-9 SCORE 1/31/2020 10/14/2020 3/9/2021   PHQ-9 Total Score MyChart 7 (Mild depression) - -   PHQ-9 Total Score 7 5 7     Previous JOSE ANGEL-7:   JOSE ANGEL-7 SCORE 1/31/2020 10/14/2020 3/9/2021   Total Score 7 (mild anxiety) - -   Total Score 7 9 " 10     NATE LEVEL:  NATE Score (Last Two) 1/31/2020   NATE Raw Score 32   Activation Score 62.6   NATE Level 3     DATA  Extended Session (60+ minutes): No  Interactive Complexity: No  Crisis: No  [facility] Patient: No    Treatment Objective(s) Addressed in This Session:  Target Behavior(s): disease management/lifestyle changes related to depression    Depressed Mood: Increase interest, engagement, and pleasure in doing things  Decrease frequency and intensity of feeling down, depressed, hopeless  Feel less tired and more energy during the day   Identify negative self-talk and behaviors: challenge core beliefs, myths, and actions  Improve concentration, focus, and mindfulness in daily activities     Current Stressors / Issues:  Patient reports a decrease in his overall depression and attributes this to his medication.  Patient rates his depression as a 2/3 out of 10 (10 being the worst). Patient also reports positive happening at work and at home.  Patient did use the challenges at last session and found that it did cause a decrease in his overall drinking.  Patient indicates he has been using positive thinking and self coaching and notices this decreases his resentment and anger, especially regarding his work situation.  [facility] praised patient for his progress and insight.  [facility] pointed out that patient is already using skills and implementing healthier practices in his life, which is also assisting with the decreased depression.  [facility] processed patient using positive thinking and assisted him in reframing multiple recent thoughts to practice.  [facility] processed patient's employment options and discussed him finding fulfillment through something that is challenging, focused on connections and relationships, and giving him time with his family.  [facility] assisted patient in weighing the pros and cons of pending options and how he can use these thoughts as he makes a decision.  [facility] educated patient about setting limits for himself and  encouraged him to continue using this principal.    Progress on Treatment Objective(s) / Homework:  Satisfactory progress - ACTION (Actively working towards change); Intervened by reinforcing change plan / affirming steps taken    Motivational Interviewing    MI Intervention: Expressed Empathy/Understanding, Supported Autonomy, Collaboration, Evocation, Open-ended questions and Reflections: simple and complex     Change Talk Expressed by the Patient: Desire to change Ability to change Reasons to change Need to change Committment to change    Provider Response to Change Talk: E - Evoked more info from patient about behavior change, A - Affirmed patient's thoughts, decisions, or attempts at behavior change, R - Reflected patient's change talk and S - Summarized patient's change talk statements    Also provided psychoeducation about behavioral health condition, symptoms, and treatment options    Care Plan review completed: No    Medication Review:  No changes to current psychiatric medication(s)    Medication Compliance:  Yes    Changes in Health Issues:   None reported    Chemical Use Review:   Substance Use: decrease in alcohol .  Patient reports frequency of use several days a week.  Provided encouragement towards sobriety  Provided support and affirmation for steps taken towards sobriety         Tobacco Use: No current tobacco use.      Assessment: Current Emotional / Mental Status (status of significant symptoms):  Risk status (Self / Other harm or suicidal ideation)  Patient has had a history of suicidal ideation: Patient reports he started having suicidal ideation over a year ago but no plan, intent or attempts.  Patient denies current fears or concerns for personal safety.  Patient denies current or recent suicidal ideation or behaviors.  Patient denies current or recent homicidal ideation or behaviors.  Patient denies current or recent self injurious behavior or ideation.  Patient denies other safety  concerns.  A safety and risk management plan has not been developed at this time, however patient was encouraged to call [facility] should there be a change in any of these risk factors.    Appearance:   Not able to assess over phone   Eye Contact:   Not able to assess over phone   Psychomotor Behavior: Not able to assess over phone   Attitude:   Cooperative  Interested  Orientation:   All  Speech   Rate / Production: Normal    Volume:  Normal   Mood:    Anxious  Depressed   Affect:    Appropriate   Thought Content:  Clear   Thought Form:  Coherent  Logical   Insight:    Good     Diagnoses:  1. Moderate episode of recurrent major depressive disorder (H)      Collateral Reports Completed:  Not Applicable    Plan: (Homework, other):  Patient was given information about behavioral services and encouraged to schedule a follow up appointment with the clinic [facility] in 2 weeks.  He was also given information about mental health symptoms and treatment options .  CD Recommendations: No indications of CD issues.    JESU Bearden  April 2, 2021

## 2021-04-05 ENCOUNTER — VIRTUAL VISIT (OUTPATIENT)
Dept: PEDIATRICS | Facility: CLINIC | Age: 36
End: 2021-04-05
Payer: COMMERCIAL

## 2021-04-05 DIAGNOSIS — E78.2 MIXED HYPERLIPIDEMIA: ICD-10-CM

## 2021-04-05 DIAGNOSIS — F32.A ANXIETY AND DEPRESSION: ICD-10-CM

## 2021-04-05 DIAGNOSIS — R79.89 ELEVATED LIVER FUNCTION TESTS: ICD-10-CM

## 2021-04-05 DIAGNOSIS — I10 BENIGN ESSENTIAL HYPERTENSION: ICD-10-CM

## 2021-04-05 DIAGNOSIS — F41.9 ANXIETY AND DEPRESSION: ICD-10-CM

## 2021-04-05 DIAGNOSIS — F10.10 ALCOHOL ABUSE: Primary | ICD-10-CM

## 2021-04-05 PROCEDURE — 96127 BRIEF EMOTIONAL/BEHAV ASSMT: CPT | Performed by: NURSE PRACTITIONER

## 2021-04-05 PROCEDURE — 99214 OFFICE O/P EST MOD 30 MIN: CPT | Mod: TEL | Performed by: NURSE PRACTITIONER

## 2021-04-05 RX ORDER — PROPRANOLOL HYDROCHLORIDE 20 MG/1
20 TABLET ORAL 2 TIMES DAILY
Qty: 180 TABLET | Refills: 3 | Status: SHIPPED | OUTPATIENT
Start: 2021-04-05 | End: 2021-05-25

## 2021-04-05 ASSESSMENT — ANXIETY QUESTIONNAIRES
IF YOU CHECKED OFF ANY PROBLEMS ON THIS QUESTIONNAIRE, HOW DIFFICULT HAVE THESE PROBLEMS MADE IT FOR YOU TO DO YOUR WORK, TAKE CARE OF THINGS AT HOME, OR GET ALONG WITH OTHER PEOPLE: SOMEWHAT DIFFICULT
GAD7 TOTAL SCORE: 11
6. BECOMING EASILY ANNOYED OR IRRITABLE: NEARLY EVERY DAY
7. FEELING AFRAID AS IF SOMETHING AWFUL MIGHT HAPPEN: SEVERAL DAYS
5. BEING SO RESTLESS THAT IT IS HARD TO SIT STILL: SEVERAL DAYS
2. NOT BEING ABLE TO STOP OR CONTROL WORRYING: SEVERAL DAYS
3. WORRYING TOO MUCH ABOUT DIFFERENT THINGS: SEVERAL DAYS
1. FEELING NERVOUS, ANXIOUS, OR ON EDGE: NEARLY EVERY DAY

## 2021-04-05 ASSESSMENT — PATIENT HEALTH QUESTIONNAIRE - PHQ9
5. POOR APPETITE OR OVEREATING: SEVERAL DAYS
SUM OF ALL RESPONSES TO PHQ QUESTIONS 1-9: 8

## 2021-04-06 ENCOUNTER — PATIENT OUTREACH (OUTPATIENT)
Dept: CARE COORDINATION | Facility: CLINIC | Age: 36
End: 2021-04-06

## 2021-04-06 ASSESSMENT — ANXIETY QUESTIONNAIRES: GAD7 TOTAL SCORE: 11

## 2021-04-06 NOTE — PROGRESS NOTES
Clinic Care Coordination Contact  [facility]/Voicemail  Left Voicemail     Clinical Data: Care Coordinator Outreach  Outreach attempted x 1.  Left message on patient's voicemail with call back information and requested return call.    Chart Review: Referral from PCP- Not willing to do IOP for leave, but wants to understand barriers to doing IOP. What are his legal rights? Can he get paid while on leave? Can they legally get rid of his position while he is out?    Plan: Care Coordinator will try to reach patient again in 1-2 business days.    AMANDEEP Bill, B.S. [facility] Care Coordination  [facility]:  [place], [place]  Phone: [phone]

## 2021-04-16 ENCOUNTER — VIRTUAL VISIT (OUTPATIENT)
Dept: BEHAVIORAL HEALTH | Facility: CLINIC | Age: 36
End: 2021-04-16
Payer: COMMERCIAL

## 2021-04-16 DIAGNOSIS — F41.9 ANXIETY: Primary | ICD-10-CM

## 2021-04-16 DIAGNOSIS — F33.1 MODERATE EPISODE OF RECURRENT MAJOR DEPRESSIVE DISORDER (H): ICD-10-CM

## 2021-04-16 PROCEDURE — 90832 PSYTX W PT 30 MINUTES: CPT | Mod: 95 | Performed by: SOCIAL WORKER

## 2021-04-16 NOTE — PROGRESS NOTES
"Session switched to telephone as patient was unable to get video to work.    The patient has been notified of the following:      \"We have found that certain health care needs can be provided without the need for a face to face visit.  This service lets us provide the care you need with a phone conversation.       I will have full access to your [place] medical record during this entire phone call.   I will be taking notes for your medical record.      Since this is like an office visit, we will bill your insurance company for this service.       There are potential benefits and risks of telephone visits (e.g. limits to patient confidentiality) that differ from in-person visits.?  Confidentiality still applies for telephone services, and nobody will record the visit.  It is important to be in a quiet, private space that is free of distractions (including cell phone or other devices) during the visit.??      If during the course of the call I believe a telephone visit is not appropriate, you will not be charged for this service\"     Consent has been obtained for this service by care team member: Yes     [facility] Primary [facility]: Integrated Behavioral Health  April 16, 2021    Behavioral Health Clinician Progress Note    Patient Name: Mike Villalpando       Service Type:  Individual      Service Location:   Phone call (patient / identified key support person reached)     Session Start Time: 17:17 Session End Time: 17:51      Session Length: 16 - 37      Attendees: Patient    Visit Activities (Refresh list every visit): [facility] Only    Diagnostic Assessment Date: Pending  Treatment Plan Review Date: Pending  See Flowsheets for today's PHQ-9 and JOSE ANGEL-7 results  Previous PHQ-9:   PHQ-9 SCORE 10/14/2020 3/9/2021 4/5/2021   PHQ-9 Total Score MyChart - - -   PHQ-9 Total Score 5 7 8     Previous JOSE ANGEL-7:   JOSE ANGEL-7 SCORE 10/14/2020 3/9/2021 4/5/2021   Total Score - - -   Total Score 9 10 11     NATE LEVEL:  NATE Score " (Last Two) 1/31/2020   NATE Raw Score 32   Activation Score 62.6   NATE Level 3     DATA  Extended Session (60+ minutes): No  Interactive Complexity: No  Crisis: No  [facility] Patient: No    Treatment Objective(s) Addressed in This Session:  Target Behavior(s): disease management/lifestyle changes related to depression    Depressed Mood: Increase interest, engagement, and pleasure in doing things  Decrease frequency and intensity of feeling down, depressed, hopeless  Feel less tired and more energy during the day   Identify negative self-talk and behaviors: challenge core beliefs, myths, and actions  Improve concentration, focus, and mindfulness in daily activities     Current Stressors / Issues:  Patient reports his depression continues to be stable and he feels that his medications are working.  Patient denies noticing any depressive symptoms.  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  Patient feels that he has been handling the situation as best he can and most of his anxiety is related to the unknown of what will take place.  Patient has been using the skill of setting limits and is noticing slight decrease in his alcohol consumption.  Patient feels he is stable at this time.  [facility] validated patient for his feelings in his experience.  [facility] praised patient for using limits and noticing some small improvement, and discussed how this is a success.  [facility] processed the recent changes and patient trying to remain positive and noticing how these changes could work for his benefit.  [facility]  discussed 4 count breathing and how patient can use this to manage anxiety and also attempt to regulate his blood pressure.  [facility] encouraged patient to continue with his current skills.    Progress on Treatment Objective(s) / Homework:  Satisfactory progress - ACTION (Actively working towards change); Intervened by reinforcing change plan / affirming steps  taken    Motivational Interviewing    MI Intervention: Expressed Empathy/Understanding, Supported Autonomy, Collaboration, Evocation, Open-ended questions and Reflections: simple and complex     Change Talk Expressed by the Patient: Desire to change Ability to change Reasons to change Need to change Committment to change    Provider Response to Change Talk: E - Evoked more info from patient about behavior change, A - Affirmed patient's thoughts, decisions, or attempts at behavior change, R - Reflected patient's change talk and S - Summarized patient's change talk statements    Also provided psychoeducation about behavioral health condition, symptoms, and treatment options    Care Plan review completed: No    Medication Review:  No changes to current psychiatric medication(s)    Medication Compliance:  Yes    Changes in Health Issues:   None reported    Chemical Use Review:   Substance Use: decrease in alcohol .  Patient reports frequency of use several days a week.  Provided encouragement towards sobriety  Provided support and affirmation for steps taken towards sobriety    Patient is seeing a slight decrease in his overall consumption.     Tobacco Use: No current tobacco use.      Assessment: Current Emotional / Mental Status (status of significant symptoms):  Risk status (Self / Other harm or suicidal ideation)  Patient has had a history of suicidal ideation: Patient reports he started having suicidal ideation over a year ago but no plan, intent or attempts.  Patient denies current fears or concerns for personal safety.  Patient denies current or recent suicidal ideation or behaviors.  Patient denies current or recent homicidal ideation or behaviors.  Patient denies current or recent self injurious behavior or ideation.  Patient denies other safety concerns.  A safety and risk management plan has not been developed at this time, however patient was encouraged to call [place] [id number] should there be a change in  any of these risk factors.    Appearance:   Not able to assess over phone   Eye Contact:   Not able to assess over phone   Psychomotor Behavior: Not able to assess over phone   Attitude:   Cooperative  Interested  Orientation:   All  Speech   Rate / Production: Normal    Volume:  Normal   Mood:    Anxious   Affect:    Appropriate   Thought Content:  Clear   Thought Form:  Coherent  Logical   Insight:    Good     Diagnoses:  1. Anxiety    2. Moderate episode of recurrent major depressive disorder (H)      Collateral Reports Completed:  Not Applicable    Plan: (Homework, other): Patient will try to use for count breathing and focus on positives.  Patient was given information about behavioral services and encouraged to schedule a follow up appointment with the clinic [facility] in 2 weeks.  He was also given information about mental health symptoms and treatment options .  CD Recommendations: Practice Harm Reduction: Patient will continue to decrease his overall  use.    JESU Bearden  April 16, 2021

## 2021-05-06 ENCOUNTER — TELEPHONE (OUTPATIENT)
Dept: PEDIATRICS | Facility: CLINIC | Age: 36
End: 2021-05-06

## 2021-05-10 DIAGNOSIS — F41.9 ANXIETY AND DEPRESSION: ICD-10-CM

## 2021-05-10 DIAGNOSIS — F41.9 ANXIETY: ICD-10-CM

## 2021-05-10 DIAGNOSIS — F10.10 ALCOHOL ABUSE: ICD-10-CM

## 2021-05-10 DIAGNOSIS — F32.A ANXIETY AND DEPRESSION: ICD-10-CM

## 2021-05-11 NOTE — TELEPHONE ENCOUNTER
Routing refill request to provider for review/approval because:  Patient needs to be seen because:  Due for 6 month follow up  Abnormal PHQ9    Mari Moreno RN on 5/11/2021 at 10:43 AM

## 2021-05-12 RX ORDER — ESCITALOPRAM OXALATE 20 MG/1
20 TABLET ORAL DAILY
Qty: 30 TABLET | Refills: 0 | Status: SHIPPED | OUTPATIENT
Start: 2021-05-12 | End: 2021-05-25

## 2021-05-12 RX ORDER — BUPROPION HYDROCHLORIDE 300 MG/1
300 TABLET ORAL EVERY MORNING
Qty: 30 TABLET | Refills: 0 | Status: SHIPPED | OUTPATIENT
Start: 2021-05-12 | End: 2021-05-25

## 2021-05-25 ENCOUNTER — OFFICE VISIT (OUTPATIENT)
Dept: PEDIATRICS | Facility: CLINIC | Age: 36
End: 2021-05-25
Payer: COMMERCIAL

## 2021-05-25 VITALS
DIASTOLIC BLOOD PRESSURE: 102 MMHG | BODY MASS INDEX: 33.13 KG/M2 | WEIGHT: 244.6 LBS | RESPIRATION RATE: 24 BRPM | TEMPERATURE: 98.1 F | HEIGHT: 72 IN | HEART RATE: 60 BPM | SYSTOLIC BLOOD PRESSURE: 168 MMHG

## 2021-05-25 DIAGNOSIS — F10.10 ALCOHOL ABUSE: ICD-10-CM

## 2021-05-25 DIAGNOSIS — F41.9 ANXIETY AND DEPRESSION: ICD-10-CM

## 2021-05-25 DIAGNOSIS — Z00.00 ROUTINE GENERAL MEDICAL EXAMINATION AT A HEALTH CARE FACILITY: Primary | ICD-10-CM

## 2021-05-25 DIAGNOSIS — I10 BENIGN ESSENTIAL HYPERTENSION: ICD-10-CM

## 2021-05-25 DIAGNOSIS — F32.A ANXIETY AND DEPRESSION: ICD-10-CM

## 2021-05-25 LAB
ANION GAP SERPL CALCULATED.3IONS-SCNC: 4 MMOL/L (ref 3–14)
BUN SERPL-MCNC: 12 MG/DL (ref 7–30)
CALCIUM SERPL-MCNC: 9 MG/DL (ref 8.5–10.1)
CHLORIDE SERPL-SCNC: 107 MMOL/L (ref 94–109)
CO2 SERPL-SCNC: 30 MMOL/L (ref 20–32)
CREAT SERPL-MCNC: 0.93 MG/DL (ref 0.66–1.25)
GFR SERPL CREATININE-BSD FRML MDRD: >90 ML/MIN/{1.73_M2}
GLUCOSE SERPL-MCNC: 100 MG/DL (ref 70–99)
POTASSIUM SERPL-SCNC: 4.6 MMOL/L (ref 3.4–5.3)
SODIUM SERPL-SCNC: 142 MMOL/L (ref 133–144)

## 2021-05-25 PROCEDURE — 99395 PREV VISIT EST AGE 18-39: CPT | Performed by: NURSE PRACTITIONER

## 2021-05-25 PROCEDURE — 96127 BRIEF EMOTIONAL/BEHAV ASSMT: CPT | Performed by: NURSE PRACTITIONER

## 2021-05-25 PROCEDURE — 99214 OFFICE O/P EST MOD 30 MIN: CPT | Mod: 25 | Performed by: NURSE PRACTITIONER

## 2021-05-25 PROCEDURE — 36415 COLL VENOUS BLD VENIPUNCTURE: CPT | Performed by: NURSE PRACTITIONER

## 2021-05-25 PROCEDURE — 80048 BASIC METABOLIC PNL TOTAL CA: CPT | Performed by: NURSE PRACTITIONER

## 2021-05-25 RX ORDER — LISINOPRIL 10 MG/1
10 TABLET ORAL DAILY
Qty: 90 TABLET | Refills: 0 | Status: SHIPPED | OUTPATIENT
Start: 2021-05-25 | End: 2021-08-09

## 2021-05-25 RX ORDER — ESCITALOPRAM OXALATE 20 MG/1
20 TABLET ORAL DAILY
Qty: 90 TABLET | Refills: 3 | Status: SHIPPED | OUTPATIENT
Start: 2021-05-25 | End: 2022-06-28

## 2021-05-25 RX ORDER — BUPROPION HYDROCHLORIDE 300 MG/1
300 TABLET ORAL EVERY MORNING
Qty: 90 TABLET | Refills: 1 | Status: SHIPPED | OUTPATIENT
Start: 2021-05-25 | End: 2021-12-20

## 2021-05-25 ASSESSMENT — ENCOUNTER SYMPTOMS
FREQUENCY: 0
HEMATOCHEZIA: 0
PALPITATIONS: 0
SORE THROAT: 0
ARTHRALGIAS: 1
DYSURIA: 0
PARESTHESIAS: 0
CONSTIPATION: 0
DIARRHEA: 0
HEARTBURN: 0
JOINT SWELLING: 1
DIZZINESS: 0
FEVER: 0
ABDOMINAL PAIN: 0
COUGH: 0
WEAKNESS: 0
MYALGIAS: 1
HEMATURIA: 0
CHILLS: 0
NERVOUS/ANXIOUS: 0
EYE PAIN: 0
SHORTNESS OF BREATH: 0
NAUSEA: 0
HEADACHES: 0

## 2021-05-25 ASSESSMENT — ANXIETY QUESTIONNAIRES
GAD7 TOTAL SCORE: 3
GAD7 TOTAL SCORE: 3
4. TROUBLE RELAXING: NOT AT ALL
GAD7 TOTAL SCORE: 3
5. BEING SO RESTLESS THAT IT IS HARD TO SIT STILL: NOT AT ALL
3. WORRYING TOO MUCH ABOUT DIFFERENT THINGS: NOT AT ALL
7. FEELING AFRAID AS IF SOMETHING AWFUL MIGHT HAPPEN: SEVERAL DAYS
7. FEELING AFRAID AS IF SOMETHING AWFUL MIGHT HAPPEN: SEVERAL DAYS
6. BECOMING EASILY ANNOYED OR IRRITABLE: SEVERAL DAYS
2. NOT BEING ABLE TO STOP OR CONTROL WORRYING: NOT AT ALL
1. FEELING NERVOUS, ANXIOUS, OR ON EDGE: SEVERAL DAYS

## 2021-05-25 ASSESSMENT — PATIENT HEALTH QUESTIONNAIRE - PHQ9
10. IF YOU CHECKED OFF ANY PROBLEMS, HOW DIFFICULT HAVE THESE PROBLEMS MADE IT FOR YOU TO DO YOUR WORK, TAKE CARE OF THINGS AT HOME, OR GET ALONG WITH OTHER PEOPLE: SOMEWHAT DIFFICULT
SUM OF ALL RESPONSES TO PHQ QUESTIONS 1-9: 3
SUM OF ALL RESPONSES TO PHQ QUESTIONS 1-9: 3

## 2021-05-25 ASSESSMENT — MIFFLIN-ST. JEOR: SCORE: 2068.53

## 2021-05-25 NOTE — PATIENT INSTRUCTIONS
-Start lisinopril for BP  -Labs and BP check in 2 weeks  -I will also check testosterone in 2 weeks      Preventive Health Recommendations  Male Ages 26 - 39    Yearly exam:             See your health care provider every year in order to  o   Review health changes.   o   Discuss preventive care.    o   Review your medicines if your doctor has prescribed any.    You should be tested each year for STDs (sexually transmitted diseases), if you re at risk.     After age 35, talk to your provider about cholesterol testing. If you are at risk for heart disease, have your cholesterol tested at least every 5 years.     If you are at risk for diabetes, you should have a diabetes test (fasting glucose).  Shots: Get a flu shot each year. Get a tetanus shot every 10 years.     Nutrition:    Eat at least 5 servings of fruits and vegetables daily.     Eat whole-grain bread, whole-wheat pasta and brown rice instead of white grains and rice.     Get adequate Calcium and Vitamin D.     Lifestyle    Exercise for at least 150 minutes a week (30 minutes a day, 5 days a week). This will help you control your weight and prevent disease.     Limit alcohol to one drink per day.     No smoking.     Wear sunscreen to prevent skin cancer.     See your dentist every six months for an exam and cleaning.

## 2021-05-25 NOTE — PROGRESS NOTES
SUBJECTIVE:   CC: Mike Villalpando is an 36 year old male who presents for   Patient has been advised of split billing requirements and indicates understanding: Yes  Healthy Habits:     Getting at least 3 servings of Calcium per day:  Yes    Bi-annual eye exam:  Yes    Dental care twice a year:  Yes    Sleep apnea or symptoms of sleep apnea:  Daytime drowsiness    Diet:  Breakfast skipped    Frequency of exercise:  1 day/week    Duration of exercise:  30-45 minutes    Taking medications regularly:  Yes    Medication side effects:  None    PHQ-2 Total Score: 1    Additional concerns today:  No          -------------------------------------    Today's PHQ-2 Score: 3  PHQ-2 ( 1999 [employer]) 3/9/2021   Q1: Little interest or pleasure in doing things 2   Q2: Feeling down, depressed or hopeless 1   PHQ-2 Score 3   PHQ-2 Score -       Abuse: Current or Past(Physical, Sexual or Emotional)- No  Do you feel safe in your environment? Yes    Have you ever done Advance Care Planning? (For example, a Health Directive, POLST, or a discussion with a medical provider or your loved ones about your wishes): No, advance care planning information given to patient to review.  Patient declined advance care planning discussion at this time.    Social History     Tobacco Use     Smoking status: Never Smoker     Smokeless tobacco: Former User     Types: Chew   Substance Use Topics     Alcohol use: Yes     Comment: 5-6 times per week, 6 per time     If you drink alcohol do you typically have >3 drinks per day or >7 drinks per week? Yes        No flowsheet data found.  AUDIT - Alcohol Use Disorders Identification Test - Reproduced from the World Health Organization Audit 2001 (Second Edition) 5/25/2021   1.  How often do you have a drink containing alcohol? 2 to 3 times a week   2.  How many drinks containing alcohol do you have on a typical day when you are drinking? 5 or 6   3.  How often do you have five or more drinks on one occasion? Weekly    4.  How often during the last year have you found that you were not able to stop drinking once you had started? Never   5.  How often during the last year have you failed to do what was normally expected of you because of drinking? Monthly   6.  How often during the last year have you needed a first drink in the morning to get yourself going after a heavy drinking session? Never   7.  How often during the last year have you had a feeling of guilt or remorse after drinking? Less than monthly   8.  How often during the last year have you been unable to remember what happened the night before because of your drinking? Less than monthly   9.  Have you or someone else been injured because of your drinking? No   10. Has a relative, friend, doctor or other health care worker been concerned about your drinking or suggested you cut down? Yes, during the last year   TOTAL SCORE 16       Last PSA: No results found for: PSA    Reviewed orders with patient. Reviewed health maintenance and updated orders accordingly - Yes  Lab work is in process    Reviewed and updated as needed this visit by clinical staff                 Reviewed and updated as needed this visit by Provider                    Review of Systems   Constitutional: Negative for chills and fever.   HENT: Negative for congestion, ear pain, hearing loss and sore throat.    Eyes: Negative for pain and visual disturbance.   Respiratory: Negative for cough and shortness of breath.    Cardiovascular: Negative for chest pain, palpitations and peripheral edema.   Gastrointestinal: Negative for abdominal pain, constipation, diarrhea, heartburn, hematochezia and nausea.   Genitourinary: Negative for discharge, dysuria, frequency, genital sores, hematuria, impotence and urgency.   Musculoskeletal: Positive for arthralgias, joint swelling and myalgias.   Skin: Negative for rash.   Neurological: Negative for dizziness, weakness, headaches and paresthesias.  "  Psychiatric/Behavioral: Negative for mood changes. The patient is not nervous/anxious.        OBJECTIVE:   BP (!) 168/102 (BP Location: Right arm, Cuff Size: Adult Large)   Pulse 60   Temp 98.1  F (36.7  C) (Oral)   Resp 24   Ht 1.822 m (5' 11.75\")   Wt 110.9 kg (244 lb 9.6 oz)   BMI 33.41 kg/m      Physical Exam  GENERAL: healthy, alert and no distress  EYES: Eyes grossly normal to inspection, PERRL and conjunctivae and sclerae normal  HENT: ear canals and TM's normal, nose and mouth without ulcers or lesions  NECK: no adenopathy, no asymmetry, masses, or scars and thyroid normal to palpation  RESP: lungs clear to auscultation - no rales, rhonchi or wheezes  CV: regular rate and rhythm, normal S1 S2, no S3 or S4, no murmur, click or rub, no peripheral edema and peripheral pulses strong  ABDOMEN: soft, nontender, no hepatosplenomegaly, no masses and bowel sounds normal  MS: no gross musculoskeletal defects noted, no edema  SKIN: no suspicious lesions or rashes  NEURO: Normal strength and tone, mentation intact and speech normal  PSYCH: mentation appears normal, affect normal/bright    Diagnostic Test Results:  Labs reviewed in Epic    ASSESSMENT/PLAN:   1. Routine general medical examination at a health care facility  - Basic metabolic panel  - **Testosterone Free and Total FUTURE anytime; Future    2. Anxiety and depression  3. Alcohol abuse  Feels it has improved dramatically. Declines SI/HI. Feels \"back to his normal self.\" States he is still drinking, but dramatically less. States he has 3-4 white claws a few nights a week. I suspect he is drinking more than what he mentions here, but he declines further intervention  - buPROPion (WELLBUTRIN XL) 300 MG 24 hr tablet; Take 1 tablet (300 mg) by mouth every morning  Dispense: 90 tablet; Refill: 1  - escitalopram (LEXAPRO) 20 MG tablet; Take 1 tablet (20 mg) by mouth daily  Dispense: 90 tablet; Refill: 3  -Continue meds  -Declines further therapy, IOP, " "treatment for ETOH, or checking LFTs.  -Reviewed risks of untreated liver issues.     4. Benign essential hypertension  New diagnosis. Has been increasing over the past few years. Denies any chest pain, shortness of breath, vision changes, etc. States he had several cups of coffee prior to the appointment and was anxious about coming.   - Basic metabolic panel  - **Basic metabolic panel FUTURE anytime; Future  - lisinopril (ZESTRIL) 10 MG tablet; Take 1 tablet (10 mg) by mouth daily  Dispense: 90 tablet; Refill: 0  -Kidney function today. Start lisinopril. Repeat BP and labs in 2 weeks  -Lifestyle recommendations      Patient has been advised of split billing requirements and indicates understanding: Yes  COUNSELING:   Reviewed preventive health counseling, as reflected in patient instructions    Estimated body mass index is 35.62 kg/m  as calculated from the following:    Height as of 3/9/21: 1.803 m (5' 11\").    Weight as of 3/9/21: 115.8 kg (255 lb 6.4 oz).     Weight management plan: Discussed healthy diet and exercise guidelines    He reports that he has never smoked. He quit smokeless tobacco use about 2 years ago.  His smokeless tobacco use included chew.      Counseling Resources:  ATP IV Guidelines  Pooled Cohorts Equation Calculator  FRAX Risk Assessment  ICSI Preventive Guidelines  Dietary Guidelines for Americans, 2010  USDA's MyPlate  ASA Prophylaxis  Lung CA Screening    MARIA FERNANDA [facility] BERTHA  Answers for HPI/ROS submitted by the patient on 5/25/2021   Annual Exam:  If you checked off any problems, how difficult have these problems made it for you to do your work, take care of things at home, or get along with other people?: Somewhat difficult  PHQ9 TOTAL SCORE: 3  JOSE ANGEL 7 TOTAL SCORE: 3    "

## 2021-05-26 ASSESSMENT — ANXIETY QUESTIONNAIRES: GAD7 TOTAL SCORE: 3

## 2021-05-26 ASSESSMENT — PATIENT HEALTH QUESTIONNAIRE - PHQ9: SUM OF ALL RESPONSES TO PHQ QUESTIONS 1-9: 3

## 2021-06-08 ENCOUNTER — ALLIED HEALTH/NURSE VISIT (OUTPATIENT)
Dept: PEDIATRICS | Facility: CLINIC | Age: 36
End: 2021-06-08
Payer: COMMERCIAL

## 2021-06-08 VITALS — SYSTOLIC BLOOD PRESSURE: 147 MMHG | DIASTOLIC BLOOD PRESSURE: 97 MMHG

## 2021-06-08 DIAGNOSIS — I10 BENIGN ESSENTIAL HYPERTENSION: ICD-10-CM

## 2021-06-08 DIAGNOSIS — Z00.00 ROUTINE GENERAL MEDICAL EXAMINATION AT A HEALTH CARE FACILITY: ICD-10-CM

## 2021-06-08 DIAGNOSIS — R79.89 ELEVATED LIVER FUNCTION TESTS: ICD-10-CM

## 2021-06-08 DIAGNOSIS — E78.2 MIXED HYPERLIPIDEMIA: ICD-10-CM

## 2021-06-08 DIAGNOSIS — R79.89 ELEVATED LIVER FUNCTION TESTS: Primary | ICD-10-CM

## 2021-06-08 LAB
ALBUMIN SERPL-MCNC: 4.6 G/DL (ref 3.4–5)
ALP SERPL-CCNC: 59 U/L (ref 40–150)
ALT SERPL W P-5'-P-CCNC: 116 U/L (ref 0–70)
ANION GAP SERPL CALCULATED.3IONS-SCNC: 7 MMOL/L (ref 3–14)
AST SERPL W P-5'-P-CCNC: 67 U/L (ref 0–45)
BILIRUB SERPL-MCNC: 0.6 MG/DL (ref 0.2–1.3)
BUN SERPL-MCNC: 9 MG/DL (ref 7–30)
CALCIUM SERPL-MCNC: 9.3 MG/DL (ref 8.5–10.1)
CHLORIDE SERPL-SCNC: 104 MMOL/L (ref 94–109)
CHOLEST SERPL-MCNC: 244 MG/DL
CO2 SERPL-SCNC: 28 MMOL/L (ref 20–32)
CREAT SERPL-MCNC: 0.78 MG/DL (ref 0.66–1.25)
GFR SERPL CREATININE-BSD FRML MDRD: >90 ML/MIN/{1.73_M2}
GLUCOSE SERPL-MCNC: 91 MG/DL (ref 70–99)
HDLC SERPL-MCNC: 60 MG/DL
LDLC SERPL CALC-MCNC: 156 MG/DL
NONHDLC SERPL-MCNC: 184 MG/DL
POTASSIUM SERPL-SCNC: 4.3 MMOL/L (ref 3.4–5.3)
PROT SERPL-MCNC: 8.1 G/DL (ref 6.8–8.8)
SODIUM SERPL-SCNC: 139 MMOL/L (ref 133–144)
TRIGL SERPL-MCNC: 138 MG/DL

## 2021-06-08 PROCEDURE — 80061 LIPID PANEL: CPT | Performed by: NURSE PRACTITIONER

## 2021-06-08 PROCEDURE — 99000 SPECIMEN HANDLING OFFICE-LAB: CPT | Performed by: NURSE PRACTITIONER

## 2021-06-08 PROCEDURE — 36415 COLL VENOUS BLD VENIPUNCTURE: CPT | Performed by: NURSE PRACTITIONER

## 2021-06-08 PROCEDURE — 99207 PR NO CHARGE NURSE ONLY: CPT

## 2021-06-08 PROCEDURE — 84403 ASSAY OF TOTAL TESTOSTERONE: CPT | Mod: 90 | Performed by: NURSE PRACTITIONER

## 2021-06-08 PROCEDURE — 84270 ASSAY OF SEX HORMONE GLOBUL: CPT | Performed by: NURSE PRACTITIONER

## 2021-06-08 PROCEDURE — 80053 COMPREHEN METABOLIC PANEL: CPT | Performed by: NURSE PRACTITIONER

## 2021-06-08 NOTE — PROGRESS NOTES
I met with Mike Villalpando at the request of Ida Pantoja  to recheck his blood pressure.  Blood pressure medications on the med list were reviewed with patient.    Patient has taken all medications as per usual regimen: yes  Patient reports tolerating them without any issues or concerns: yes    Vitals:    06/08/21 1009 06/08/21 1025   BP: (!) 160/100 (!) 142/100   BP Location: Right arm Right arm   Patient Position: Sitting Sitting   Cuff Size: Adult Large Adult Large       After 5 minutes, the patient's blood pressure remained greater than or equal to 140/90.    Is the patient currently having any chest pain? No  Does the patient currently have a headache? No  Does the patient currently have any vision changes? No  Does the patient currently have any nausea? No  Does the patient currently have any abdominal pain? No    The blood pressure is less than 165/100. The previous encounter note was reviewed. The patient denies the above symptoms. The patient was scheduled for a follow up appointment on NA. The note will be sent to the provider for review.

## 2021-06-08 NOTE — PROGRESS NOTES
Mike Villalpando is a 36 year old year old patient who comes in today for a Blood Pressure check because of new medication.    Patient is taking medication as prescribed. Took medication this morning.   Patient is tolerating medications well.  Patient is not monitoring Blood Pressure at home.    Current complaints: none. Denies all worrisome signs and symptoms of hypertension  Disposition:  Home. Chart will be routed to PCP. No appointments today in clinic.     Patient drank two cups of coffee this morning.

## 2021-06-10 LAB
SHBG SERPL-SCNC: 11 NMOL/L (ref 11–80)
TESTOST FREE SERPL-MCNC: 5.34 NG/DL (ref 4.7–24.4)
TESTOST SERPL-MCNC: 175 NG/DL (ref 240–950)

## 2021-06-24 ENCOUNTER — VIRTUAL VISIT (OUTPATIENT)
Dept: PEDIATRICS | Facility: CLINIC | Age: 36
End: 2021-06-24
Payer: COMMERCIAL

## 2021-06-24 DIAGNOSIS — F10.10 ALCOHOL ABUSE: ICD-10-CM

## 2021-06-24 DIAGNOSIS — M25.561 CHRONIC PAIN OF RIGHT KNEE: Primary | ICD-10-CM

## 2021-06-24 DIAGNOSIS — G89.29 CHRONIC PAIN OF RIGHT KNEE: Primary | ICD-10-CM

## 2021-06-24 PROCEDURE — 99213 OFFICE O/P EST LOW 20 MIN: CPT | Mod: 95 | Performed by: NURSE PRACTITIONER

## 2021-06-24 NOTE — PATIENT INSTRUCTIONS
Check uric acid with next labs at the clinic  Here is some info on gout:Patient Education     Gout Diet  Gout is a painful condition caused by an excess of uric acid, a waste product made by the body. Uric acid forms crystals that collect in the joints. The immune response to these crystals brings on symptoms of joint pain and swelling. This is called a gout attack. Often, medications and diet changes are combined to manage gout. Below are some guidelines for changing your diet to help you manage gout and prevent attacks. Your healthcare provider will help you determine the best eating plan for you.  Eating to manage gout  Weight loss for those who are overweight may help reduce gout attacks.  Eat less of these foods  Eating too many foods containing purines may raise the levels of uric acid in your body. This raises your risk for a gout attack. Try to limit these foods and drinks:    Alcohol, such as beer and red wine. You may be told to avoid alcohol completely.    Soft drinks that contain sugar or high fructose corn syrup    Certain fish, including anchovies, sardines, fish eggs, and herring    Shellfish    Certain meats, such as red meat, hot dogs, luncheon meats, and turkey    Organ meats, such as liver, kidneys, and sweetbreads    Legumes, such as dried beans and peas    Other high fat foods such as gravy, whole milk, and high fat cheeses    Vegetables such as asparagus, cauliflower, spinach, and mushrooms used to be thought to contribute to an increased risk for a gout attack, but recent studies show that high purine vegetables don't increase the risk for a gout attack.  Eat more of these foods  Other foods may be helpful for people with gout. Add some of these foods to your diet:    Cherries contain chemicals that may lower uric acid.    Omega fatty acids. These are found in some fatty fish such as salmon, certain oils (flax, olive, or nut), and nuts themselves. Omega fatty acids may help prevent  inflammation due to gout.    Dairy products that are low-fat or fat-free, such as cheese and yogurt    Complex carbohydrate foods, including whole grains, brown rice, oats, and beans    Coffee, in moderation    Water, approximately 64 ounces per day  Follow-up care  Follow up with your healthcare provider as advised.  When to seek medical advice  Call your healthcare provider right away if any of these occur:    Return of gout symptoms, usually at night:    Severe pain, swelling, and heat in a joint, especially the base of the big toe    Affected joint is hard to move    Skin of the affected joint is purple or red    Fever of 100.4 F (38 C) or higher    Pain that doesn't get better even with prescribed medicine   [facility] last reviewed this educational content on 6/1/2018 2000-2021 [employer]. All rights reserved. This information is not intended as a substitute for professional medical care. Always follow your healthcare professional's instructions.

## 2021-06-24 NOTE — PROGRESS NOTES
Maximilian is a 36 year old who is being evaluated via a billable telephone visit.      What phone number would you like to be contacted at? [phone]  How would you like to obtain your AVS? Aldo    Assessment & Plan     Chronic pain of right knee  Chronic intermittent in nature, about 1-2 episodes of pain per year the past 3-4 yrs. Saw ortho and was told this was tendinitis (no records available). Unable to perform exam today due to phone visit but reassured that pt tells me no erythema of joint and he is afebrile.   Other differential: gout. Discussed dietary relationship (fariba alcohol intake) and appropriate treatment with NSAIDs.   Recommend checking uric acid post-flare, which he would like to discuss with PCP at next office visit.   Work letter written.    Alcohol abuse  Tells me he has cut back.               Patient Instructions   Check uric acid with next labs at the clinic  Here is some info on gout:Patient Education     Gout Diet  Gout is a painful condition caused by an excess of uric acid, a waste product made by the body. Uric acid forms crystals that collect in the joints. The immune response to these crystals brings on symptoms of joint pain and swelling. This is called a gout attack. Often, medications and diet changes are combined to manage gout. Below are some guidelines for changing your diet to help you manage gout and prevent attacks. Your healthcare provider will help you determine the best eating plan for you.  Eating to manage gout  Weight loss for those who are overweight may help reduce gout attacks.  Eat less of these foods  Eating too many foods containing purines may raise the levels of uric acid in your body. This raises your risk for a gout attack. Try to limit these foods and drinks:    Alcohol, such as beer and red wine. You may be told to avoid alcohol completely.    Soft drinks that contain sugar or high fructose corn syrup    Certain fish, including anchovies, sardines, fish eggs,  and herring    Shellfish    Certain meats, such as red meat, hot dogs, luncheon meats, and turkey    Organ meats, such as liver, kidneys, and sweetbreads    Legumes, such as dried beans and peas    Other high fat foods such as gravy, whole milk, and high fat cheeses    Vegetables such as asparagus, cauliflower, spinach, and mushrooms used to be thought to contribute to an increased risk for a gout attack, but recent studies show that high purine vegetables don't increase the risk for a gout attack.  Eat more of these foods  Other foods may be helpful for people with gout. Add some of these foods to your diet:    Cherries contain chemicals that may lower uric acid.    Omega fatty acids. These are found in some fatty fish such as salmon, certain oils (flax, olive, or nut), and nuts themselves. Omega fatty acids may help prevent inflammation due to gout.    Dairy products that are low-fat or fat-free, such as cheese and yogurt    Complex carbohydrate foods, including whole grains, brown rice, oats, and beans    Coffee, in moderation    Water, approximately 64 ounces per day  Follow-up care  Follow up with your healthcare provider as advised.  When to seek medical advice  Call your healthcare provider right away if any of these occur:    Return of gout symptoms, usually at night:    Severe pain, swelling, and heat in a joint, especially the base of the big toe    Affected joint is hard to move    Skin of the affected joint is purple or red    Fever of 100.4 F (38 C) or higher    Pain that doesn't get better even with prescribed medicine   Alleantia last reviewed this educational content on 6/1/2018 2000-2021 [employer]. All rights reserved. This information is not intended as a substitute for professional medical care. Always follow your healthcare professional's instructions.               Return in about 1 week (around 7/1/2021), or if symptoms worsen or fail to improve.    CODY Sena  [facility] [place] Y[place] is a 36 year old who presents for the following health issues    HPI     Pt needs workability note to return to work.    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  Pt has hx of tendonitis in R knee;  Was seen at specialist 2 years ago and diagnosed. Note intermittent flare-ups that includes swelling, pain, difficulty bending knee secondary to pain, difficulty walking secondary to pain. Usually goes away by icing, ibuprofen and resting in 1-5 days.    Pt currently reports swelling, stiffness and discomfort in R knee below patella, but notes overall improving. Rates pain 2-3/10. Denies throbbing pain or difficulty sleeping.    Pain improved today with ibuprofen.    Has had about 1-2 flares of pain the past 4 years.   Unsure if any dietary relationships.   Hx of alcohol abuse although tells me he has cut way back  Denies fever, chills, redness of skin.       Review of Systems   Otherwise ROS is negative except as stated above.        Objective           Vitals:  No vitals were obtained today due to virtual visit.    Physical Exam   no distress  PSYCH: Alert and oriented times 3; coherent speech, normal   rate and volume, able to articulate logical thoughts, able   to abstract reason, no tangential thoughts, no hallucinations   or delusions  His affect is normal  RESP: No cough, no audible wheezing, able to talk in full sentences  Remainder of exam unable to be completed due to telephone visits              Phone call duration: 11 minutes

## 2021-06-24 NOTE — LETTER
[facility]  [address]  [address]  [place] [address]  Phone: [phone]  Fax: [phone]    06/24/21    Mike Villalpando  [address] [address]      To whom it may concern:     Maximilian had a visit with our clinic on 6/24/21. Please excuse him from work from 6/23-6/25. He can return to work without restrictions on 6/30/21. Let me know if you have any questions.    Sincerely,      Libby Damon, NP

## 2021-07-16 NOTE — PROGRESS NOTES
"Maximilian is a 36 year old who is being evaluated via a billable telephone visit.      What phone number would you like to be contacted at? [phone]  How would you like to obtain your AVS? Aldo    Assessment & Plan     (F33.1) Major depressive disorder, recurrent episode, moderate (H)  (primary encounter diagnosis)  (F41.9) Anxiety  Comment: Reports this is improving. However, his ETOH use is still very high, so I suspect his anxiety/depression is not fully controlled.   Plan:   -Continue Wellbutrin & Lexapro  -See below  -Contracted for safety.  -Consider psychiatry      (F10.10) Alcohol abuse  Comment: Drinking 1-4 white claws on work days and 8 typically on non work days.  When asked if he could cut back he states he \"could work on it.\" Discussed that I'm concerned that he is unable to cut back even in light of elevated liver enzymes, etc. Discussed that his gout is possibly also related to drinking. He agrees to get an assessment, but states he wouldn't have time to leave his family or work. While I do think a treatment program would be best, at least getting the assessment would be a start. Possibly there are things psychiatry or outpatient therapy could do to help.   Plan: Care Coordination Referral  -Discussed risks of NSAIDs and ETOH.   -Discussed risk of cirrhosis and liver failure if he doesn't stop drinking  -Avoid tylenol  -Has not completed liver ultrasound  -Get rule 25    (R79.89) Elevated liver function tests    (M25.50) Multiple joint pain  Comment: Knee pain now gone but sx now present in ankle. Has been present on and off in multiple joints. Likely gout-comes on at night. Severe pain with light touch lasting 3-5 days.   Plan: Uric acid  -Uric acid when flare is over  -Consider preventative, although I told him quitting drinking may get rid of flares.     (R79.89) Low testosterone  Comment: Needs second check.   Testosteron    (I10) Benign essential hypertension  Comment: Needs recheck.   No " follow-ups on file.    Ida Pantoja, MARIA FERNANDA CNP  [facility] [place] Y[place] is a 36 year old who presents for the following health issues     HPI     Depression and Anxiety Follow-Up    How are you doing with your depression since your last visit? No change    How are you doing with your anxiety since your last visit?  No change    Are you having other symptoms that might be associated with depression or anxiety? No    Have you had a significant life event? No     Do you have any concerns with your use of alcohol or other drugs? No    Pt reports no change and has been feeling good lately. Pt is not currently seeing any mental health professionals.    Social History     Tobacco Use     Smoking status: Never Smoker     Smokeless tobacco: Former User     Types: Chew   Substance Use Topics     Alcohol use: Yes     Comment: 4 days a week, 4-6 drinks per session     Drug use: No     PHQ 4/5/2021 5/25/2021 7/19/2021   PHQ-9 Total Score 8 3 3   Q9: Thoughts of better off dead/self-harm past 2 weeks Several days Not at all Not at all   F/U: Thoughts of suicide or self-harm - - -   F/U: Self harm-plan - - -   F/U: Self-harm action - - -   F/U: Safety concerns - - -     JOSE ANGEL-7 SCORE 4/5/2021 5/25/2021 7/19/2021   Total Score - 3 (minimal anxiety) -   Total Score 11 3 4       Suicide Assessment Five-step Evaluation and Treatment (SAFE-T)      How many servings of fruits and vegetables do you eat daily? 1-2    On average, how many sweetened beverages do you drink each day (Examples: soda, juice, sweet tea, etc.  Do NOT count diet or artificially sweetened beverages)?   0    How many days per week do you exercise enough to make your heart beat faster? 3 or less    How many minutes a day do you exercise enough to make your heart beat faster? 30 - 60    How many days per week do you miss taking your medication? 0    Review of Systems   Constitutional, HEENT, cardiovascular, pulmonary, gi and gu  systems are negative, except as otherwise noted.      OBJECTIVE:  PSYCH: Sounds bright and appropriate on phone.       Phone call duration: 15 minutes

## 2021-07-19 ENCOUNTER — VIRTUAL VISIT (OUTPATIENT)
Dept: PEDIATRICS | Facility: CLINIC | Age: 36
End: 2021-07-19
Payer: COMMERCIAL

## 2021-07-19 DIAGNOSIS — F33.1 MAJOR DEPRESSIVE DISORDER, RECURRENT EPISODE, MODERATE (H): Primary | ICD-10-CM

## 2021-07-19 DIAGNOSIS — F41.9 ANXIETY: ICD-10-CM

## 2021-07-19 DIAGNOSIS — R79.89 ELEVATED LIVER FUNCTION TESTS: ICD-10-CM

## 2021-07-19 DIAGNOSIS — I10 BENIGN ESSENTIAL HYPERTENSION: ICD-10-CM

## 2021-07-19 DIAGNOSIS — M25.50 MULTIPLE JOINT PAIN: ICD-10-CM

## 2021-07-19 DIAGNOSIS — F10.10 ALCOHOL ABUSE: ICD-10-CM

## 2021-07-19 DIAGNOSIS — R79.89 LOW TESTOSTERONE: ICD-10-CM

## 2021-07-19 PROCEDURE — 99214 OFFICE O/P EST MOD 30 MIN: CPT | Mod: 95 | Performed by: NURSE PRACTITIONER

## 2021-07-19 ASSESSMENT — ANXIETY QUESTIONNAIRES
2. NOT BEING ABLE TO STOP OR CONTROL WORRYING: SEVERAL DAYS
6. BECOMING EASILY ANNOYED OR IRRITABLE: SEVERAL DAYS
3. WORRYING TOO MUCH ABOUT DIFFERENT THINGS: SEVERAL DAYS
1. FEELING NERVOUS, ANXIOUS, OR ON EDGE: SEVERAL DAYS
IF YOU CHECKED OFF ANY PROBLEMS ON THIS QUESTIONNAIRE, HOW DIFFICULT HAVE THESE PROBLEMS MADE IT FOR YOU TO DO YOUR WORK, TAKE CARE OF THINGS AT HOME, OR GET ALONG WITH OTHER PEOPLE: SOMEWHAT DIFFICULT
5. BEING SO RESTLESS THAT IT IS HARD TO SIT STILL: NOT AT ALL
GAD7 TOTAL SCORE: 4
7. FEELING AFRAID AS IF SOMETHING AWFUL MIGHT HAPPEN: NOT AT ALL

## 2021-07-19 ASSESSMENT — PATIENT HEALTH QUESTIONNAIRE - PHQ9
5. POOR APPETITE OR OVEREATING: NOT AT ALL
SUM OF ALL RESPONSES TO PHQ QUESTIONS 1-9: 3

## 2021-07-20 ASSESSMENT — ANXIETY QUESTIONNAIRES: GAD7 TOTAL SCORE: 4

## 2021-07-23 ENCOUNTER — PATIENT OUTREACH (OUTPATIENT)
Dept: NURSING | Facility: CLINIC | Age: 36
End: 2021-07-23
Attending: NURSE PRACTITIONER
Payer: COMMERCIAL

## 2021-07-23 DIAGNOSIS — F10.10 ALCOHOL ABUSE: ICD-10-CM

## 2021-07-23 ASSESSMENT — ACTIVITIES OF DAILY LIVING (ADL): DEPENDENT_IADLS:: INDEPENDENT

## 2021-07-23 NOTE — LETTER
[facility] CARE COORDINATION  [address] [address]    July 26, 2021    Mike Villalpando  [address] [address]      Dear Mike,    I am a clinic care coordinator who works with [facility]. I wanted to thank you for spending the time to talk with me.  Below is a description of clinic care coordination and how I can further assist you.      The clinic care coordination team is made up of a registered nurse,  and community health worker who understand the health care system. The goal of clinic care coordination is to help you manage your health and improve access to the health care system in the most efficient manner. The team can assist you in meeting your health care goals by providing education, coordinating services, strengthening the communication among your providers and supporting you with any resource needs.    Please feel free to contact me with any questions or concerns. We are focused on providing you with the highest-quality healthcare experience possible and that all starts with you.     Sincerely,     Randall Zapata [facility]  Clinic Care Coordinator  [facility]  [phone]  [email or web address]          Enclosed: I have enclosed a copy of the Complex Care Plan. This has helpful information and goals that we have talked about. Please keep this in an easy to access place to use as needed.

## 2021-07-23 NOTE — LETTER
[facility]  Complex Care Plan  About Me:    Patient Name:  Mike Frankel    YOB: 1985  Age:         36 year old   [facility] MRN:    [id number] Telephone Information:  Home Phone [phone]   Mobile [phone]       Address:  [address]  [place] [address] Email address:  [email or web address]      Emergency Contact(s)    Name Relationship Lgl Grd Work Phone Home Phone Mobile Phone   1. MANJINDER FRANKEL Spouse    [phone]           Primary language:  English     needed? No   [facility] [employer] [facility]:  [phone] op. 1  Other communication barriers:    Preferred Method of Communication:  Do Not Contact  Current living arrangement: I live in a private home with family  Mobility Status/ Medical Equipment: Independent      My Access Plan  Medical Emergency 911   Primary Clinic Line [facility] - [phone]   24 Hour Appointment Line [phone] or  [phone]) (toll-free)   24 Hour Nurse Line [phone] (toll-free)   Preferred Urgent Care [facility] [place], [phone]   Preferred Hospital [facility]  [phone]   Preferred Pharmacy [facility] [place] - [place]  [address]      Behavioral Health Crisis Line The National Suicide Prevention Lifeline at 1-164.786.9058 or 911             My Care Team Members  Patient Care Team       Relationship Specialty Notifications Start End    [facility] [place] PCP - General   6/18/18     Phone: [phone] Fax: [phone]         [address] [place] [address] [place] [address]    Lyndon Gray, EMT Personal Advocate & Liaison (PAL)  Admissions 3/9/21     Ida Pantoja APRN CNP Assigned PCP   4/18/21     Phone: [phone] Fax: [phone]         [address] [place] [address]    Randall Zapata LSW Lead Care Coordinator Primary Care - CC  7/23/21     Phone: [phone]                  My Care Plans  Self Management and Treatment Plan  Goals and (Comments)  Goals        General     Mental Health Management (pt-stated)      Notes - Note created  7/26/2021  4:23 PM by Randall Zapata LSW     Goal Statement: I will consider establishing with supports to further support my goal of sobriety over the next 6 months.   Date Goal set: 7/23/2021  Barriers: Time/work.  Strengths: Accepting of resources to review for options.   Date to Achieve By: 1/1/2022  Patient expressed understanding of goal: yes  Action steps to achieve this goal:  1. I will follow up with scheduled appointments as recommended  2. I will take medications as prescribed.   3. I will contact my care team with questions, concerns, support needs.   4. I will use the clinic as a resource and I understand I can contact my clinic with 24/7 after hours services available.   5. I will discuss, review, schedule and complete any recommended overdue health maintenance with my provider/care team.  6. I will continue to outreach to care coordination as needed for additional resources or supports.   7. I will review CD treatment options and resources sent via [employer] and consider establishing with an option of interest.                     My Medical and Care Information  Problem List   Patient Active Problem List   Diagnosis     Anxiety and depression     Alcohol abuse     Elevated liver function tests     Elevated blood pressure reading without diagnosis of hypertension      Current Medications and Allergies:    Current Outpatient Medications   Medication     buPROPion (WELLBUTRIN XL) 300 MG 24 hr tablet     escitalopram (LEXAPRO) 20 MG tablet     lisinopril (ZESTRIL) 10 MG tablet     lisinopril (ZESTRIL) 20 MG tablet     traZODone (DESYREL) 50 MG tablet     No current facility-administered medications for this visit.         Care Coordination Start Date: 7/23/2021   Frequency of Care Coordination: monthly   Form Last Updated: 07/26/2021

## 2021-07-23 NOTE — PROGRESS NOTES
"Clinic Care Coordination Contact    Clinic Care Coordination Contact  OUTREACH    Referral Information:  Referral Source: PCP    Primary Diagnosis: CD    Chief Complaint   Patient presents with     Clinic Care Coordination - Initial     Resources for Support: CD Resources        Universal Utilization:      Utilization    Hospital Admissions  0             ED Visits  0             No Show Count (past year)  2                Current as of: 7/26/2021  2:26 PM              Clinical Concerns:  Current Medical Concerns:    Patient Active Problem List   Diagnosis     Anxiety and depression     Alcohol abuse     Elevated liver function tests     Elevated blood pressure reading without diagnosis of hypertension       [facility] outreached to pt on this date to review referral and assess for needs. Pt indicates he knows he needs to start considering options for support and treatment, but is not yet to \"that point.\" [facility] explained Care Coordination role and availability and offered to send pts supports to consider for treatment. Pt agreed and welcomed those supports to be sent via [employer]. [facility] sent resources. Pt denied any other needs at this garfield and admits he is not yet to a point in which he feels he is ready to decide. Pt welcomes [facility] outreach again to check on status      Current Behavioral Concerns: CD    Education Provided to patient: Care Coordination role and availability. CD treatment options.         Clinical Pathway: None    Medication Management:  Medication review status: Medications reviewed and no changes reported per patient.           Functional Status:  Dependent ADLs:: Independent  Dependent IADLs:: Independent  Mobility Status: Independent  Fallen 2 or more times in the past year?: No  Any fall with injury in the past year?: No    Living Situation:  Current living arrangement:: I live in a private home with family  Type of residence:: Private home - stairs    Lifestyle & Psychosocial Needs:    Social " Determinants of Health     Tobacco Use: Medium Risk     Smoking Tobacco Use: Never Smoker     Smokeless Tobacco Use: Former User   Alcohol Use:      Frequency of Alcohol Consumption:      Average Number of Drinks:      Frequency of Binge Drinking:    Financial Resource Strain:      Difficulty of Paying Living Expenses:    Food Insecurity:      Worried About Running Out of Food in the Last Year:      Ran Out of Food in the Last Year:    Transportation Needs:      Lack of Transportation (Medical):      Lack of Transportation (Non-Medical):    Physical Activity:      Days of Exercise per Week:      Minutes of Exercise per Session:    Stress:      Feeling of Stress :    Social Connections:      Frequency of Communication with Friends and Family:      Frequency of Social Gatherings with Friends and Family:      Attends Caoda[employer] Services:      Active Member of Clubs or Organizations:      Attends Club or Organization Meetings:      Marital Status:    Intimate Partner Violence:      Fear of Current or Ex-Partner:      Emotionally Abused:      Physically Abused:      Sexually Abused:    Depression: Not at risk     PHQ-2 Score: 1   Housing Stability:      Unable to Pay for Housing in the Last Year:      Number of Places Lived in the Last Year:      Unstable Housing in the Last Year:               Mental health DX:: Yes  Mental health DX how managed:: Medication  Mental health management concern (GOAL):: Yes  Chemical Dependency Status: Current Concern     Care Coordinator has reviewed patient's Social Determinants of Health (SDoH) on this date. Upon review, changes were not  made.        Resources and Interventions:  Current Resources:      Community Resources: None  Supplies used at home:: None  Equipment Currently Used at Home: none    Advance Care Plan/Directive  Advanced Care Plans/Directives on file:: No    Referrals Placed: CD, Community Resources, [facility] Resources     Goals:   Goals        General     Mental Health  Management (pt-stated)      Notes - Note created  7/26/2021  4:23 PM by Randall Zapata LSW     Goal Statement: I will consider establishing with supports to further support my goal of sobriety over the next 6 months.   Date Goal set: 7/23/2021  Barriers: Time/work.  Strengths: Accepting of resources to review for options.   Date to Achieve By: 1/1/2022  Patient expressed understanding of goal: yes  Action steps to achieve this goal:  1. I will follow up with scheduled appointments as recommended  2. I will take medications as prescribed.   3. I will contact my care team with questions, concerns, support needs.   4. I will use the clinic as a resource and I understand I can contact my clinic with 24/7 after hours services available.   5. I will discuss, review, schedule and complete any recommended overdue health maintenance with my provider/care team.  6. I will continue to outreach to care coordination as needed for additional resources or supports.   7. I will review CD treatment options and resources sent via [employer] and consider establishing with an option of interest.               Patient/Caregiver understanding: Pt reports understanding and denies any additional questions or concerns at this times. HELGA CC engaged in AIDET communication during encounter.      Outreach Frequency: monthly  Future Appointments              In 2 weeks KELLI MA/LPN [facility] KELLI [place]    In 2 weeks KELLI LAB [facility]          Plan: Pt to review resources provided via [employer]. KIET to f/u again in 4 weeks.     OILVIA Oropeza  Clinic Care Coordinator  [facility]  [facility]  [phone]  [email or web address]

## 2021-07-26 ENCOUNTER — ALLIED HEALTH/NURSE VISIT (OUTPATIENT)
Dept: PEDIATRICS | Facility: CLINIC | Age: 36
End: 2021-07-26

## 2021-07-26 ENCOUNTER — LAB (OUTPATIENT)
Dept: LAB | Facility: CLINIC | Age: 36
End: 2021-07-26
Payer: COMMERCIAL

## 2021-07-26 ENCOUNTER — TELEPHONE (OUTPATIENT)
Dept: PEDIATRICS | Facility: CLINIC | Age: 36
End: 2021-07-26

## 2021-07-26 VITALS — SYSTOLIC BLOOD PRESSURE: 164 MMHG | DIASTOLIC BLOOD PRESSURE: 102 MMHG

## 2021-07-26 DIAGNOSIS — Z01.30 BP CHECK: Primary | ICD-10-CM

## 2021-07-26 DIAGNOSIS — M25.50 MULTIPLE JOINT PAIN: ICD-10-CM

## 2021-07-26 DIAGNOSIS — Z00.00 ROUTINE GENERAL MEDICAL EXAMINATION AT A HEALTH CARE FACILITY: ICD-10-CM

## 2021-07-26 DIAGNOSIS — R79.89 LOW TESTOSTERONE: ICD-10-CM

## 2021-07-26 LAB — URATE SERPL-MCNC: 7.9 MG/DL (ref 3.5–7.2)

## 2021-07-26 PROCEDURE — 84403 ASSAY OF TOTAL TESTOSTERONE: CPT

## 2021-07-26 PROCEDURE — 36415 COLL VENOUS BLD VENIPUNCTURE: CPT

## 2021-07-26 PROCEDURE — 84270 ASSAY OF SEX HORMONE GLOBUL: CPT

## 2021-07-26 PROCEDURE — 84550 ASSAY OF BLOOD/URIC ACID: CPT

## 2021-07-26 PROCEDURE — 99207 PR NO CHARGE NURSE ONLY: CPT | Performed by: NURSE PRACTITIONER

## 2021-07-26 RX ORDER — LISINOPRIL 20 MG/1
20 TABLET ORAL DAILY
Qty: 90 TABLET | Refills: 0 | Status: SHIPPED | OUTPATIENT
Start: 2021-07-26 | End: 2021-08-09

## 2021-07-26 NOTE — PROGRESS NOTES
BP quite high  Increase lisinopril from 10 to 20. Rx ordered.   Recheck BP and labs in 2 weeks-non fasting. Lab ordered  Pls help him schedule lab visit.    Thinking about causes of elevated BP:  -Caffeine  -Stress/anxiety  -Sleep apnea-any snoring?  -Heavy alcohol use.     Transferred info into telephone encounter.Deb Osei RN on 7/26/2021 at 12:03 PM

## 2021-07-26 NOTE — TELEPHONE ENCOUNTER
BP quite high  Increase lisinopril from 10 to 20. Rx ordered.   Recheck BP and labs in 2 weeks-non fasting. Lab ordered  Pls help him schedule lab visit.    Thinking about causes of elevated BP:  -Caffeine : He drinks 2 cups of coffee daily and sometimes monster.   -Stress/anxiety: Lots of stress at work.  -Sleep apnea-any snoring? Wife says he snores, he does wake frequently.  -Heavy alcohol use. This is being addressed.    Pt has been notified of message. Will route answers to Ida Pantoja NP.   Lab and BP check scheduled. Deb Osei RN on 7/26/2021 at 12:02 PM

## 2021-07-26 NOTE — Clinical Note
Mike Villalpando was evaluated in a [facility] today at which time his blood pressure was noted to be elevated. He has been advised to follow up with his primary care provider or with a nurse only visit.  We are also routing this message to his primary care provider as an FYI.

## 2021-07-26 NOTE — PROGRESS NOTES
Mike Villalpando was evaluated at [facility] on July 26, 2021 at which time his blood pressure was:    BP Readings from Last 3 Encounters:   07/26/21 (!) 164/102   06/08/21 (!) 147/97   05/25/21 (!) 168/102     Pulse Readings from Last 3 Encounters:   05/25/21 60   03/09/21 90   11/12/20 96       Reviewed lifestyle modifications for blood pressure control and reduction: including making healthy food choices, managing weight, getting regular exercise, smoking cessation, reducing alcohol consumption, monitoring blood pressure regularly.     Symptoms: None    BP Goal:< 140/90 mmHg    BP Assessment:  BP too high    Potential Reasons for BP too high: Anxiety    BP Follow-Up Plan: Referral to PCP    Recommendation to Provider: Pt states work stress could be related to high BP    Note completed by: Justin Fuller, PharmD  [facility] [place]

## 2021-07-29 LAB
SHBG SERPL-SCNC: 11 NMOL/L (ref 11–80)
TESTOST FREE SERPL-MCNC: 5.7 NG/DL
TESTOST SERPL-MCNC: 186 NG/DL (ref 240–950)

## 2021-08-09 ENCOUNTER — LAB (OUTPATIENT)
Dept: LAB | Facility: CLINIC | Age: 36
End: 2021-08-09

## 2021-08-09 ENCOUNTER — ALLIED HEALTH/NURSE VISIT (OUTPATIENT)
Dept: PEDIATRICS | Facility: CLINIC | Age: 36
End: 2021-08-09

## 2021-08-09 VITALS — DIASTOLIC BLOOD PRESSURE: 100 MMHG | SYSTOLIC BLOOD PRESSURE: 152 MMHG

## 2021-08-09 DIAGNOSIS — Z01.30 BP CHECK: Primary | ICD-10-CM

## 2021-08-09 DIAGNOSIS — Z01.30 BP CHECK: ICD-10-CM

## 2021-08-09 DIAGNOSIS — R79.89 LOW TESTOSTERONE: ICD-10-CM

## 2021-08-09 LAB
ANION GAP SERPL CALCULATED.3IONS-SCNC: 4 MMOL/L (ref 3–14)
BUN SERPL-MCNC: 9 MG/DL (ref 7–30)
CALCIUM SERPL-MCNC: 9.1 MG/DL (ref 8.5–10.1)
CHLORIDE BLD-SCNC: 107 MMOL/L (ref 94–109)
CO2 SERPL-SCNC: 29 MMOL/L (ref 20–32)
CORTIS SERPL-MCNC: 13.4 UG/DL (ref 4–22)
CREAT SERPL-MCNC: 0.84 MG/DL (ref 0.66–1.25)
FSH SERPL-ACNC: 5.5 IU/L (ref 0.7–10.8)
GFR SERPL CREATININE-BSD FRML MDRD: >90 ML/MIN/1.73M2
GLUCOSE BLD-MCNC: 105 MG/DL (ref 70–99)
IRON SERPL-MCNC: 115 UG/DL (ref 35–180)
LH SERPL-ACNC: 3.3 IU/L (ref 1.5–9.3)
POTASSIUM BLD-SCNC: 4.1 MMOL/L (ref 3.4–5.3)
PROLACTIN SERPL-MCNC: 13 UG/L (ref 2–18)
SODIUM SERPL-SCNC: 140 MMOL/L (ref 133–144)
T4 FREE SERPL-MCNC: 0.77 NG/DL (ref 0.76–1.46)
TRANSFERRIN SERPL-MCNC: 249 MG/DL (ref 210–360)
TSH SERPL DL<=0.005 MIU/L-ACNC: 1.57 MU/L (ref 0.4–4)

## 2021-08-09 PROCEDURE — 83540 ASSAY OF IRON: CPT

## 2021-08-09 PROCEDURE — 36415 COLL VENOUS BLD VENIPUNCTURE: CPT

## 2021-08-09 PROCEDURE — 84443 ASSAY THYROID STIM HORMONE: CPT

## 2021-08-09 PROCEDURE — 83001 ASSAY OF GONADOTROPIN (FSH): CPT

## 2021-08-09 PROCEDURE — 84439 ASSAY OF FREE THYROXINE: CPT

## 2021-08-09 PROCEDURE — 82533 TOTAL CORTISOL: CPT

## 2021-08-09 PROCEDURE — 84466 ASSAY OF TRANSFERRIN: CPT

## 2021-08-09 PROCEDURE — 84146 ASSAY OF PROLACTIN: CPT

## 2021-08-09 PROCEDURE — 80048 BASIC METABOLIC PNL TOTAL CA: CPT

## 2021-08-09 PROCEDURE — 83002 ASSAY OF GONADOTROPIN (LH): CPT

## 2021-08-09 PROCEDURE — 99207 PR NO CHARGE NURSE ONLY: CPT | Performed by: NURSE PRACTITIONER

## 2021-08-09 RX ORDER — AMLODIPINE BESYLATE 5 MG/1
5 TABLET ORAL DAILY
Qty: 90 TABLET | Refills: 3 | Status: SHIPPED | OUTPATIENT
Start: 2021-08-09 | End: 2021-10-06 | Stop reason: DRUGHIGH

## 2021-08-09 NOTE — Clinical Note
Mike Villalpando was evaluated in a [facility] today at which time his blood pressure was noted to be elevated. He has been advised to follow up with his primary care provider or with a nurse only visit.  We are also routing this message to his primary care provider as an FYI.

## 2021-08-09 NOTE — PROGRESS NOTES
Mike Villalpando was evaluated at [facility] on August 9, 2021 at which time his blood pressure was:    BP Readings from Last 3 Encounters:   08/09/21 (!) 152/100   07/26/21 (!) 164/102   06/08/21 (!) 147/97     Pulse Readings from Last 3 Encounters:   05/25/21 60   03/09/21 90   11/12/20 96       Reviewed lifestyle modifications for blood pressure control and reduction: including making healthy food choices, managing weight, getting regular exercise, smoking cessation, reducing alcohol consumption, monitoring blood pressure regularly.     Symptoms: None    BP Goal:< 140/90 mmHg    BP Assessment:  BP too high    Potential Reasons for BP too high: Caffeine use within past 30 minutes    BP Follow-Up Plan: Referral to PCP    Recommendation to Provider: Increase anti-hypertensive medication dose or add new anti-hypertensive    Note completed by: Justin Fuller, PharmD  [facility]

## 2021-08-09 NOTE — PROGRESS NOTES
Pls have pt add amlodipine 5mg. Watch for mild swelling of the ankles. Unlikely. Repeat BP check in 2 weeks

## 2021-08-09 NOTE — PROGRESS NOTES
Call placed to pt with message from provider  LM for pt to return call to the clinic    Thank you  Kacey Hubbard RN on 8/9/2021 at 12:37 PM

## 2021-08-10 ENCOUNTER — VIRTUAL VISIT (OUTPATIENT)
Dept: PEDIATRICS | Facility: CLINIC | Age: 36
End: 2021-08-10
Payer: COMMERCIAL

## 2021-08-10 ENCOUNTER — E-CONSULT (OUTPATIENT)
Dept: ENDOCRINOLOGY | Facility: CLINIC | Age: 36
End: 2021-08-10
Payer: COMMERCIAL

## 2021-08-10 DIAGNOSIS — F43.10 PTSD (POST-TRAUMATIC STRESS DISORDER): ICD-10-CM

## 2021-08-10 DIAGNOSIS — R79.89 LOW TESTOSTERONE: Primary | ICD-10-CM

## 2021-08-10 DIAGNOSIS — F33.1 MAJOR DEPRESSIVE DISORDER, RECURRENT EPISODE, MODERATE (H): ICD-10-CM

## 2021-08-10 PROCEDURE — 99207 E-CONSULT TO ENDOCRINOLOGY (ADULT OUTPT PROVIDER TO SPECIALIST WRITTEN QUESTION & RESPONSE): CPT | Performed by: NURSE PRACTITIONER

## 2021-08-10 PROCEDURE — 99214 OFFICE O/P EST MOD 30 MIN: CPT | Mod: 95 | Performed by: NURSE PRACTITIONER

## 2021-08-10 PROCEDURE — 99451 NTRPROF PH1/NTRNET/EHR 5/>: CPT

## 2021-08-10 NOTE — PROGRESS NOTES
Maximilian is a 36 year old who is being evaluated via a billable telephone visit.      Assessment/Plan:  (R79.89) Low testosterone  (primary encounter diagnosis)  Comment: Low total T but normal free. Low/normal SHBG. Econsult to endo. Has low energy and low libido but no ED or breast tissue  Plan: E-CONSULT TO ENDOCRINOLOGY (ADULT OUTPT         PROVIDER TO SPECIALIST WRITTEN QUESTION &         RESPONSE)            (F33.1) Major depressive disorder, recurrent episode, moderate (H)  Comment: Ongoing, severe. No active SI currently. Discussed more at length today. 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 There was increased stress in that the situations he was in were riskier. 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 Reports since then feeling on edge, having nightmares about work, waking up in a cold sweat, startling if his wife comes in the room without knowing she was coming which may upset him the rest of the night, and using alcohol to feel less on edge. This is certainly complicated by his heavy ETOH use. He has been hesitant to do an intensive outpatient program, which I've strongly recommended, because he is not sure what he would tell his work colleagues even though he knows it is technically confidential.   Plan:   -I asked that he consider starting Prazosin for nightmares/sleep  -Continue selective serotonin reuptake inhibitor/wellbutrin  -I recommended 2 therapists who specifically deal with trauma, one specifically deals with police officers.   -Asked that he consider doing FMLA to do an intensive outpatient program.     (F43.10) PTSD (post-traumatic stress disorder)    What phone number would you like to be contacted at? [phone]  How would you like to obtain your AVS? MyChart    Assessment & Plan     No diagnosis found.    No follow-ups on file.    Ida  MARIA FERNANDA Marsh CNP  [facility] BERTHA Yao is a 36 year old who presents for the following health issues  HPI     Hypertension Follow-up      Do you check your blood pressure regularly outside of the clinic? No     Are you following a low salt diet? No    Are your blood pressures ever more than 140 on the top number (systolic) OR more   than 90 on the bottom number (diastolic), for example 140/90? Yes    Pt to follow up on recent labs especially regarding low testosterone.      How many servings of fruits and vegetables do you eat daily?  2-3    On average, how many sweetened beverages do you drink each day (Examples: soda, juice, sweet tea, etc.  Do NOT count diet or artificially sweetened beverages)?   0    How many days per week do you exercise enough to make your heart beat faster? 3 or less    How many minutes a day do you exercise enough to make your heart beat faster? 30 - 60    How many days per week do you miss taking your medication? 0    Review of Systems   Constitutional, HEENT, cardiovascular, pulmonary, GI, , musculoskeletal, neuro, skin, endocrine and psych systems are negative, except as otherwise noted.      Objective         Phone call duration: 25minutes

## 2021-08-10 NOTE — PROGRESS NOTES
"ALL SMARTFIELDS MUST BE COMPLETED FOR PATIENT CARE AND BILLING    8/10/2021     E-Consult has been accepted.    Interprofessional consultation requested by:  Ida Pantoja APRN CNP      Clinical Question/Purpose: MY CLINICAL QUESTION IS: Possible secondary hypogonadism? Pt with a history of PTSD, alcohol abuse with elevated LFTs, HTN, and low libido/fatigue. T was low x 2. Workup reveals normal TSH, low-normal T4, normal prolactin, LH, FSH, and morning cortisol. What are our next steps in terms of ruling out secondary causes vs starting supplementation?     Patient assessment and information reviewed:   Pmhx: anxietydepression, alcohol abuse, HTN    7/1/19 telephone encounter note refers to the that he had ordered a \"testosterone supplement\", that he is on other oral supplements   It appears testosterone was lst measured as part of routine general medical exam following 5/25/2021 appt also reporting anxiety and depression , alcohol abuse and new diagnosis HTN.  There is no history to suggest low T symptoms    Labs:  1/31/17: Hgb 16, hematocrit 48  6/8/2021 1005 testosterone 175, SHBG 11, free testosterone 5.34 , , AST: 67, albumin 4.6  7/26/2021 0832: testosterone 186 ng/dL, SHBG 11 nM, free testosterone 5.7 (4.1-23.9 ng/dl)  8/9/2021 1002 cortisol 13.4, FSH 5.5, prolactin 13, free T4 0.77, TSH 1.57, glucose 105    Assessment;   low testosterone is due to low SHBG.  Calculated Free testosterone is normal .  I am not convinced he has low testosterone  Low SHBG - this can be seen in fatty liver disease, obesity,  perhaps with ETOH use.  Why does he have low SHBG?  Increased transaminases documented since 2019  History of unknown supplement use - current status unknown.   Obesity by BMI     Recommendations:   Testicular examination;   Careful history of past exposures and symptoms related to T (libido, spontaneous erections, ED, breast symptoms, infertility, hot flashes/sweats)  You could repeat the " free testosterone using equilibrium dialysis method ([place] lab send out test ID: [id number]) drawn before 9 AM assuming he has relatively normal sleep wake cycle.   If low free Testosterone is seen by the ED method then he should have pituitary MRI and treatment could be considered, taking into account other risks/considerations (PSA, Hgb/Hct)    To answer some of your other questions:   Yes, sleep apnea can contribute to lower testosterone and treating sleep apnea may raise testosterone.  Weight loss may raise testosterone .    Testosterone treatment is not recommended in untreated sleep apnea  Testosterone treatment will adversely affect his fertility.    https://pubmed.ncbi.nlm.nih.gov/84534531/    The recommendations provided in this E-Consult are based on the clinical data available to me at this time, and are furnished without the benefit of a comprehensive in-person or virtual patient evaluation, Any new clinical issues or changes in patient status since the filing of this E-Consult will need to be taken into account when assessing these recommendations. Please contact me if you have further questions.    My total time spent reviewing clinical information and formulating assessment was 15 minutes.    Report sent automatically to requesting provider once signed.     Viola Lainez MD

## 2021-08-12 ENCOUNTER — TELEPHONE (OUTPATIENT)
Dept: PEDIATRICS | Facility: CLINIC | Age: 36
End: 2021-08-12

## 2021-08-12 DIAGNOSIS — Z01.30 BP CHECK: ICD-10-CM

## 2021-08-12 DIAGNOSIS — M10.9 GOUT, UNSPECIFIED CAUSE, UNSPECIFIED CHRONICITY, UNSPECIFIED SITE: ICD-10-CM

## 2021-08-12 DIAGNOSIS — F43.10 PTSD (POST-TRAUMATIC STRESS DISORDER): Primary | ICD-10-CM

## 2021-08-13 RX ORDER — LISINOPRIL 20 MG/1
20 TABLET ORAL DAILY
Qty: 90 TABLET | Refills: 0 | COMMUNITY
Start: 2021-08-13 | End: 2021-10-06 | Stop reason: DRUGHIGH

## 2021-08-13 RX ORDER — PRAZOSIN HYDROCHLORIDE 1 MG/1
CAPSULE ORAL
Qty: 264 CAPSULE | Refills: 0 | Status: SHIPPED | OUTPATIENT
Start: 2021-08-13 | End: 2022-07-15

## 2021-08-13 RX ORDER — ALLOPURINOL 100 MG/1
100 TABLET ORAL DAILY
Qty: 90 TABLET | Refills: 0 | Status: SHIPPED | OUTPATIENT
Start: 2021-08-13 | End: 2021-12-17

## 2021-08-13 RX ORDER — COLCHICINE 0.6 MG/1
0.6 TABLET ORAL DAILY
Qty: 90 TABLET | Refills: 1 | Status: SHIPPED | OUTPATIENT
Start: 2021-08-13 | End: 2021-12-20

## 2021-08-27 ENCOUNTER — PATIENT OUTREACH (OUTPATIENT)
Dept: CARE COORDINATION | Facility: CLINIC | Age: 36
End: 2021-08-27

## 2021-08-27 ASSESSMENT — ACTIVITIES OF DAILY LIVING (ADL): DEPENDENT_IADLS:: INDEPENDENT

## 2021-08-27 NOTE — PROGRESS NOTES
Clinic Care Coordination Contact  [facility]/Voicemail       Clinical Data: Care Coordinator Outreach  Outreach attempted x 1.  Left message on patient's voicemail with call back information and requested return call.  Plan: Care Coordinator will try to reach patient again in 10 business days.    Randall Zapata [facility]  Clinic Care Coordinator  [facility]  [facility] [place]  [phone]  [email or web address]

## 2021-09-03 NOTE — TELEPHONE ENCOUNTER
RECORDS RECEIVED FROM: internal    DATE RECEIVED: 11.1.21    NOTES (FOR ALL VISITS) STATUS DETAILS   OFFICE NOTES from referring provider internal  Ida Pantoja   OFFICE NOTES from other specialist na    ED NOTES na    OPERATIVE REPORT  (thyroid, pituitary, adrenal, parathyroid) na    MEDICATION LIST internal     IMAGING      DEXASCAN na    MRI (BRAIN) na    XR (Chest) na    CT (HEAD/NECK/CHEST/ABDOMEN) na    NUCLEAR  na    ULTRASOUND (HEAD/NECK) na    LABS     DIABETES: HBGA1C, CREATININE, FASTING LIPIDS, MICROALBUMIN URINE, POTASSIUM, TSH, T4    THYROID: TSH, T4, CBC, THYRODLONULIN, TOTAL T3, FREE T4, CALCITONIN, CEA internal  Cbc- 1.31.17   Cortisol 8.9.21  Lipid- 6.8.21  Prolactin 8.9.21  TSH/T4- 8.9.21

## 2021-09-09 ASSESSMENT — ACTIVITIES OF DAILY LIVING (ADL): DEPENDENT_IADLS:: INDEPENDENT

## 2021-09-09 NOTE — PROGRESS NOTES
Clinic Care Coordination Contact  [facility]/Voicemail    Referral Source: PCP  Clinical Data: Care Coordinator Outreach  Outreach attempted x 2.  Left message on patient's voicemail with call back information and requested return call.  Plan: Care Coordinator will try to reach patient again in 10 business days.    [facility]  Clinic Care Coordinator  [facility] [place] [facility]  [phone]  [email or web address]

## 2021-09-21 ENCOUNTER — PATIENT OUTREACH (OUTPATIENT)
Dept: NURSING | Facility: CLINIC | Age: 36
End: 2021-09-21
Payer: COMMERCIAL

## 2021-09-21 ASSESSMENT — ACTIVITIES OF DAILY LIVING (ADL): DEPENDENT_IADLS:: INDEPENDENT

## 2021-09-21 NOTE — PROGRESS NOTES
Clinic Care Coordination Contact    Follow Up Progress Note      Assessment: SWCC outreached to pt on this date to follow up on goal and assess for needs.     Pt states he continues to feel overwhelmed, anxious, depressed, and nervous. Pt and SWCC reviewed recent communication to clinic in that he states he has not started some of the new medications he was prescribed because he is feeling overwhelmed.  SWCC and pt reviewed MTM referral. Pt agreed stating he feels this would be helpful. MTM referral placed.     SWCC and pt reviewed MH. Pt states he has been taking psychotropic medications and denies any concerns with those medications. Pt did state, however, he continues to have symptoms r/t PTSD, nightmares, anxiety and depression. Reviewed recent question related to providers referred to pt:     Leidy Wild- [email or web address]  ?   Erick Burgos- [email or web address]    [facility] provided these provider options to PCP/Care Team to offer to pt as they have specialties in Trauma/PTSD as well as Leidy has experience with Law enforcement. Reviewed these two providers and explained different backgrounds/specialties. Pt states he will look into them closer and decide which one to establish with.     SWCC and pt discussed alcohol use. Pt reports he has continue to struggle with it in the sense that he uses it to sleep. Pt states he has struggled with nightmares and it was essentially a self medication tactic. Pt denied needing any IP or OP treatment options but would like to have some support group/avenues to consider and have on hand.     Care Gaps:    Health Maintenance Due   Topic Date Due     CBC  01/31/2018     INFLUENZA VACCINE (1) 09/01/2021       Care Gaps Last addressed on 8/10/2021    Goals addressed this encounter:   Goals Addressed                    This Visit's Progress       Mental Health Management (pt-stated)    20%      Goal Statement: I will consider establishing with supports to further support my goal of sobriety over the next 6 months.   Date Goal set: 7/23/2021  Barriers: Time/work.  Strengths: Accepting of resources to review for options.   Date to Achieve By: 1/1/2022  Patient expressed understanding of goal: yes  Action steps to achieve this goal:  1. I will follow up with scheduled appointments as recommended  2. I will take medications as prescribed.   3. I will contact my care team with questions, concerns, support needs.   4. I will use the clinic as a resource and I understand I can contact my clinic with 24/7 after hours services available.   5. I will discuss, review, schedule and complete any recommended overdue health maintenance with my provider/care team.  6. I will continue to outreach to care coordination as needed for additional resources or supports.   7. I will review CD treatment options and resources sent via [employer] and consider establishing with an option of interest.               Intervention/Education provided during outreach: AA and Alternatives to AA. MTM referral.      Outreach Frequency: monthly    Plan: Pt to establish with a therapist and outreach to care coordination if there eis a need for additional options. Care Coordinator will follow up in 2-3 weeks.     Randall Zapata [facility]  Clinic Care Coordinator  [facility]  [facility]  [phone]  [email or web address]

## 2021-10-04 NOTE — TELEPHONE ENCOUNTER
Called and spoke with patient. Informed patient of Ida Scarlett's message.     1) Patient is interested in consoling and trying prazosin. [employer] message sent to patient with names of therapists who specialize in trauma. He will call and schedule with one of them. Pharmacy pended for prazosin.     2) Patient denies any issues with spontaneous erections or breast tissue but does have issues reaching reaching orgasm.     3) Patient will work on reducing his alcohol consumption but would like to start treatment for uric acid levels.     4) Verified patient is able to log into [employer]. Patient agree's to check it regularly and use it to communicate to simplify communication.     Mari Moreno RN on 8/12/2021 at 2:47 PM    
Called pt to follow up. No answer, LVM to call back on my personal extension or if I am unavailable to call clinic back.    If pt calls back:  Ask how it has been going on new medications and if he has any questions about that.  Ask if pt has scheduled therapy - see [employer] message from Mari Moreno RN on 8/12/21  Ask if pt has scheduled endocrinology - [phone]; if so, find out name of endocrinologist    Lyndon Gray, EMT at 11:02 AM on August 31, 2021   [facility] Health Guide   [phone]  
Called pt to follow up. Pt looked at MyChart message from PCP on 8/15 but notes he has not been able to  medications.     He has not reached out to therapists at this time but notes he will do so when he has free time.    Instructed pt to  medication at his earliest convenience. Pt has no questions or concerns at this time. Encouraged pt to reach out with any further questions or concerns especially after reviewing Mari's and PCP's message. Pt agreeable to plan and verbalized understanding.    Lyndon Gray, EMT at 1:35 PM on August 20, 2021   [facility] Health Guide   [phone]    
Called pt.    Notes he hasn't had a chance to look at [employer] as has worked long hours the past couple days. He does have access to his [employer] including on his phone.    Instructed pt to log into [employer] to read Mari's message about therapists and Ida's message with overall instructions. Also let pt know that prescriptions were sent in to his pharmacy to .    Scheduled non fasting labs per message on 8/30 with a reminder for pt to recheck BP at pharmacy.    Pt has no questions or concerns at this time. Encouraged pt to reach out with any further questions or concerns especially after reviewing Mari's and PCP's message. Pt agreeable to plan and verbalized understanding.    Lyndon Gray, EMT at 10:02 AM on August 13, 2021   [facility] Health Guide   [phone]  
Continued in separate encounter.     MIRI Manjarrez  [phone]  12:03 PM, September 16, 2021  
Please call (triage not MA/TC)    1. 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 There are laws that prohibit his coworkers and bosses from knowing what he is out on leave for. She said there are two therapists she would recommend who specialize in trauma. The first one specifically deals with law enforcement officers    -This provider specifically specializes with law enforcement: Leidy Wild- [email or web address]  ?  -Here is another one- Erick Burgos- [email or web address]    What are his thoughts on this or trying the prazosin for nightmares? I'd recommend both.       2. I heard back from endo. Apparently his free testosterone (the one that works in his body) is normal, but the sex hormone binding globulin is low which is typically due to liver issues/alcohol. Currently this is not likely causing symptoms, but she said to do an actual visit if he is concerned. She wanted me to ask him if he has any issues with spontaneous erections, libido, or getting breast tissue?    3. We didn't discuss his uric acid. This is high. Likely the cause of his joint issues. Will likely improve significantly with less etoh. In the mean time, does he want to start treatment? If so, I can send instructions via [employer] as it's complex.     4. If possible it might be helpful for him to utilize [employer] (not reading messages) to simplify communication  
Please keep track of the tasks I've asked him to do in the message. And please be sure he gets his BP rechecked Thanks!  
Pt scheduled with MTM on 10/6/21 to review medications.    Pt scheduled with endo on 11/1/21.    Pt reviewed therapists with CC in separate encounter.    Called pt. Pt has 4 appts scheduled this month.    Also scheduled lab only appt for futured labs.  Scheduled f/u with PCP at first available.    Pt has no further questions or concerns.    Encouraged pt to reach out with further questions or concerns. Pt agreeable to plan and verbalized understanding.    Lyndon Gray, EMT at 1:10 PM on October 4, 2021   [facility] Health Guide   [phone]  
Sent [employer] message with follow-up questions.     MIRI Manjarrez  [phone]  11:27 AM, September 7, 2021  
taxi

## 2021-10-06 ENCOUNTER — OFFICE VISIT (OUTPATIENT)
Dept: PHARMACY | Facility: CLINIC | Age: 36
End: 2021-10-06
Attending: NURSE PRACTITIONER

## 2021-10-06 VITALS
OXYGEN SATURATION: 100 % | WEIGHT: 247.9 LBS | HEART RATE: 98 BPM | DIASTOLIC BLOOD PRESSURE: 98 MMHG | BODY MASS INDEX: 33.86 KG/M2 | SYSTOLIC BLOOD PRESSURE: 164 MMHG

## 2021-10-06 DIAGNOSIS — Z71.85 VACCINE COUNSELING: ICD-10-CM

## 2021-10-06 DIAGNOSIS — F32.A ANXIETY AND DEPRESSION: ICD-10-CM

## 2021-10-06 DIAGNOSIS — M1A.29X0 DRUG-INDUCED CHRONIC GOUT OF MULTIPLE SITES WITHOUT TOPHUS: ICD-10-CM

## 2021-10-06 DIAGNOSIS — F10.10 ALCOHOL ABUSE: ICD-10-CM

## 2021-10-06 DIAGNOSIS — F41.9 ANXIETY AND DEPRESSION: ICD-10-CM

## 2021-10-06 DIAGNOSIS — R03.0 ELEVATED BLOOD PRESSURE READING WITHOUT DIAGNOSIS OF HYPERTENSION: Primary | ICD-10-CM

## 2021-10-06 PROCEDURE — 99606 MTMS BY PHARM EST 15 MIN: CPT | Performed by: PHARMACIST

## 2021-10-06 PROCEDURE — 99605 MTMS BY PHARM NP 15 MIN: CPT | Performed by: PHARMACIST

## 2021-10-06 RX ORDER — AMLODIPINE AND BENAZEPRIL HYDROCHLORIDE 10; 40 MG/1; MG/1
1 CAPSULE ORAL DAILY
Qty: 30 CAPSULE | Refills: 2 | Status: SHIPPED | OUTPATIENT
Start: 2021-10-06 | End: 2022-01-05

## 2021-10-06 NOTE — PROGRESS NOTES
Medication Therapy Management (MTM) Encounter    ASSESSMENT:                            Medication Adherence/Access: feeling overburdened by number of medications, will try to simplify regimen    Gout: Patient is not at goal of uric acid <6mg/dl. Patient would benefit from the following medication changes: increasing colchicine if having flare and continuing colchicine for 3 months with starting allopurinol. Patient is due for uric acid level and has this scheduled for a few weeks    Hypertension: Patient is not meeting blood pressure goal of < 130/80mmHg. Patient would benefit from the following changes - changing to a combination tablets and increasing the dose of amlodipine and benazapril, increasing exercise and weight loss, limiting/reducing sodium and alcohol cessation.    Anxiety and Depression/Alcohol cessation: Patient would benefit from continuing to work with therapist, medications are helpful, no changes made today, encouraged alcohol cessation, he did not want any resources today. Discussed side effects of prazosin, he is holding off on this medication for now.    Vaccines:  Needs improvement.  Pt due for influenza vaccine(s).      PLAN:                            Vaccines:  Influenza vaccine in one month    Gout:  Aim for uric acid <6, labs to be done a few weeks    Gout flares:  Colchicine   Day 1: 1.2 mg (2 tablets) at the first sign of flare, followed by 0.6 mg after 1 hour    Day 2 and thereafter: Oral: 0.6 mg once or twice daily until flare resolves     You would remain on colchicine 3 months while starting allopurinol    High Blood Pressure:  Stop amlodipine and lisinopril, start benazapril-lisinopril 40/10mg one tablet every day     Follow-up: Return in about 3 months (around 1/6/2022) for MTM Pharmacist Visit, clinic visit.    SUBJECTIVE/OBJECTIVE:                          Mike Villalpando is a 36 year old male coming in for an initial visit. He was referred to me from Ida Pantoja CNP.  "     Reason for visit: from provider: \"Feeling overwhelmed with current medication regimen. Has not started new medications prescribed.\"    Allergies/ADRs: Reviewed in chart  Past Medical History: Reviewed in chart  Tobacco: He reports that he has never smoked. He quit smokeless tobacco use about 3 years ago.  His smokeless tobacco use included chew.  Alcohol: 7-9 beverages / week Spoke with Randall Zapata recently who gave support information for alcoholic cessation      Medication Adherence/Access: Patient takes medications directly from bottles.  Patient takes medications 1 time(s) per day.   Per patient, misses medication 0 times per week.   Medication barriers: everything is overwhelming especially the number of pills he takes.   The patient fills medications at [facility]: YES.    Gout: Currently taking allopurinol 100mg every day and colchicine 0.6mg every day. Patient reports no current pain concerns. Ankle pain/ flared on 9/23, 4-5 flares up in last few years. Patient is experiencing the following medication side effects: none.   Uric Acid   Date Value Ref Range Status   07/26/2021 7.9 (H) 3.5 - 7.2 mg/dL Final   ]  Creatinine   Date Value Ref Range Status   08/09/2021 0.84 0.66 - 1.25 mg/dL Final   06/08/2021 0.78 0.66 - 1.25 mg/dL Final     Hypertension: Current medications include amlodipine 5mg every day, lisinopril 20mg every day.  Patient does not self-monitor blood pressure.  Patient reports the following medication side effects: dry cough.  Gym use to go 6-7 times a week, no currently exercising  Does salt food  See above drinking alcohol  Gained weigh, use to be ~230lb  BP Readings from Last 3 Encounters:   10/06/21 (!) 164/98   08/09/21 (!) 152/100   07/26/21 (!) 164/102   Estimated body mass index is 33.86 kg/m  as calculated from the following:    Height as of 5/25/21: 5' 11.75\" (1.822 m).    Weight as of this encounter: 247 lb 14.4 oz (112.4 kg).    Wt Readings from Last 4 Encounters:   10/06/21 " 247 lb 14.4 oz (112.4 kg)   05/25/21 244 lb 9.6 oz (110.9 kg)   03/09/21 255 lb 6.4 oz (115.8 kg)   11/12/20 250 lb 3.2 oz (113.5 kg)     Depression/Anxiety/PTSD:  Current medications include: Escitalopram 20mg once daily and Bupropion 300mg once daily ER.  Not taking prazosin. From EMR review and meeting with care coordination, continues to have symptoms for PTSD, nightmares, anxiety and depression Pt reports that depression symptoms are work in progress, improved with medication but still has episodes of depression for several days and lack of motivation.  For insomnia was taking trazodone but stopped, helpful, uncertain if melatonin was helpful.  He has not had any nightmares for last 1.5 weeks and wants to hold off on the prazosin but wants on medication list, contemplating starting it.  PHQ-9 SCORE 4/5/2021 5/25/2021 7/19/2021   PHQ-9 Total Score MyChart - 3 (Minimal depression) -   PHQ-9 Total Score 8 3 3       Most Recent Immunizations   Administered Date(s) Administered     COVID-19,PF,Moderna 04/07/2021     Influenza Vaccine IM > 6 months Valent IIV4 (Alfuria,Fluzone) 11/11/2019     Tdap (Adult) Unspecified Formulation 03/27/2012       Today's Vitals: BP (!) 164/98   Pulse 98   Wt 247 lb 14.4 oz (112.4 kg)   SpO2 100%   BMI 33.86 kg/m    ----------------    I spent 54 minutes with this patient today. All changes were made via collaborative practice agreement with Ida Pantoja CNP. A copy of the visit note was provided to the patient's primary care provider.    The patient was given a summary of these recommendations.     Rabia Rose, PharmD [facility]  Medication Therapy Management Practitioner   #[phone]     Medication Therapy Recommendations  Drug-induced chronic gout of multiple sites without tophus    Current Medication: colchicine (COLCYRS) 0.6 MG tablet   Rationale: Does not understand instructions - Adherence - Adherence   Recommendation: Provide Education   Status: Patient Agreed -  Adherence/Education         Elevated blood pressure reading without diagnosis of hypertension    Current Medication: lisinopril (ZESTRIL) 20 MG tablet (Discontinued)   Rationale: Patient prefers not to take - Adherence - Adherence   Recommendation: Change Medication - amLODIPine-benazepril 10-40 MG capsule   Status: Accepted per CPA         Vaccine counseling    Rationale: Preventive therapy - Needs additional medication therapy - Indication   Recommendation: Start Medication - INFLUENZA VAC RECOM HA QUAD PF IM   Status: Patient Agreed - Adherence/Education

## 2021-10-06 NOTE — PATIENT INSTRUCTIONS
Recommendations from today's MTM visit:                                                    MTM (medication therapy management) is a service provided by a clinical pharmacist designed to help you get the most of out of your medicines.   Today we reviewed what your medicines are for, how to know if they are working, that your medicines are safe and how to make your medicine regimen as easy as possible.     Vaccines:  Influenza vaccine in one month    Gout:  Aim for uric acid <6, labs to be done a few weeks    Gout flares:  Colchicine   Day 1: 1.2 mg (2 tablets) at the first sign of flare, followed by 0.6 mg after 1 hour    Day 2 and thereafter: Oral: 0.6 mg once or twice daily until flare resolves     You would remain on colchicine 3 months while starting allopurinol    High Blood Pressure:  Stop amlodipine and lisinopril, start benazapril-lisinopril 40/10mg one tablet every day     Follow-up: Return in about 3 months (around 1/6/2022) for MTM Pharmacist Visit, clinic visit.    Goals for patients with hypertension (high blood pressure):  Your blood pressure should be less than <130/80.   Today, yours was   BP Readings from Last 1 Encounters:   10/06/21 (!) 164/98   .  Things you can do to help lower your blood pressure:  1) Take your medications as directed  2) Regular exercise- Aim for at least 30 minutes of daily physical activity.  3) Weight loss if overweight- losing as little as 10 lbs. can reduce blood pressure  4) Avoid excess dietary sodium (salt).  You should not consume more than 1500mg of sodium per day.  Following this restriction can lower your blood pressure as much as adding another medication.  -Foods high in sodium include: ham, rice, deli meats, canned soups and vegetables, frozen meals, gravy, chips, cheeses, fast food, bread.  -1 teaspoon of table salt is about 2300mg of sodium.  5) Avoid alcohol- if you drink, please do so in moderation (no more than 1 drink per day for women or 2 drinks per day  for men).  6) Quit smoking- Tobacco injures vessel walls and speeds up the process of hardening of the arteries, making it harder to control your blood pressure.  7) Look for ways to reduce stress in your life.  8)  Try and get plenty of sleep.  9) Consider the DASH diet.  This diet can lower your blood pressure as much as adding another medication if followed correctly.   a. The DASH diet is high in fruits and vegetables, whole grains, fat-free or low-fat milk products, fish and poultry, beans, seeds and nuts.  b. The DASH diet is low in salt, sodium, sugar, sweets, high-sugar drinks, red meat, saturated fat and trans fats.  c. For more information on the DASH diet, visit the National Heart, Lung and Blood [employer] at http://www.nhlbi.nih.gov        To schedule another MTM appointment, please call the clinic directly or you may call the MTM scheduling line at [phone] or toll-free at [phone].     My Clinical Pharmacist's contact information:                                                      It was a pleasure seeing you today!  Please feel free to contact me with any questions or concerns you have.      Rabia Rose, PharmD [facility]  Medication Therapy Management Practitioner   #[phone]    It was great to speak with you today.  I value your experience and would be very thankful for your time with providing feedback on our clinic survey. You may receive a survey via email or text message in the next few days.

## 2021-10-09 ENCOUNTER — HEALTH MAINTENANCE LETTER (OUTPATIENT)
Age: 36
End: 2021-10-09

## 2021-10-15 ENCOUNTER — VIRTUAL VISIT (OUTPATIENT)
Dept: PEDIATRICS | Facility: CLINIC | Age: 36
End: 2021-10-15
Payer: COMMERCIAL

## 2021-10-15 DIAGNOSIS — Z91.199 NO-SHOW FOR APPOINTMENT: Primary | ICD-10-CM

## 2021-10-15 NOTE — PROGRESS NOTES
After this writer checked the pt in, he noted he did not need this appt anymore. Since within 2 hours of appt, no showing this appt.    This patient was a no show for this scheduled appointment.

## 2021-10-18 ENCOUNTER — VIRTUAL VISIT (OUTPATIENT)
Dept: PEDIATRICS | Facility: CLINIC | Age: 36
End: 2021-10-18
Payer: COMMERCIAL

## 2021-10-18 DIAGNOSIS — F43.10 PTSD (POST-TRAUMATIC STRESS DISORDER): Primary | ICD-10-CM

## 2021-10-18 PROCEDURE — 99213 OFFICE O/P EST LOW 20 MIN: CPT | Mod: 95 | Performed by: NURSE PRACTITIONER

## 2021-10-18 NOTE — PROGRESS NOTES
Maximilian is a 36 year old who is being evaluated via a billable telephone visit.      What phone number would you like to be contacted at? [phone]  How would you like to obtain your AVS? Mail a copy    Assessment & Plan   (F43.10) PTSD (post-traumatic stress disorder)  (primary encounter diagnosis)  Comment: See above. 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 Drinking much less and feeling better. No further SI/HI  Plan:   -Discussed that I think this is a good plan, glad he's seeking more intensive treatment.   -He is to keep me posted.        No follow-ups on file.    Ida Pantoja, MAR[facility]ERNANDA [facility]    Emili Yao is a 36 year old who presents for the following health issues    HPI     Pt is currently off of work for an indeterminate amount of time, approximately 6-8 weeks.     Pt notes going through process of medically retiring from career. He already has , and is working with mental health providers. He has worked with his therapist who diagnosed him with PTSD and completed forms for him to take time off work due to mental health. Has seen them for 2 visits so far.    Pt wants provider to know as he potentially will need documentation from PCP as part of this process.     Regarding medications, met with MTM. Pt notes he was overwhelmed with amount of medications but MTM was able to reduce medications by 1 with a combination pill for BP. Taking medications as prescribed (daily at same time) except for PTSD/sleep medication which he started last night. Only side effect is dry cough but is resolving. Finds medications therapeutic.    Paid leave last 3 weeks. 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 He was honest with his Sergeant. 2 weeks later a commander called him for a meeting. They put him on paid leave to try to help him, but the pt isn't sure if they are truly trying to help him or if they are trying to push him out.    Pt did a  "PTSD evaluation through Dr. Fabian, possibly a psychiatrist. States he scored high on the PTSD scale.     Has a . Plans to \"medical out\" of the profession due to PTSD.     Review of Systems   Constitutional, HEENT, cardiovascular, pulmonary, gi and gu systems are negative, except as otherwise noted.          Phone call duration: 15 minutes  "

## 2021-10-21 ENCOUNTER — LAB (OUTPATIENT)
Dept: LAB | Facility: CLINIC | Age: 36
End: 2021-10-21
Payer: COMMERCIAL

## 2021-10-21 DIAGNOSIS — R79.89 ELEVATED LIVER FUNCTION TESTS: Primary | ICD-10-CM

## 2021-10-21 DIAGNOSIS — M10.9 GOUT, UNSPECIFIED CAUSE, UNSPECIFIED CHRONICITY, UNSPECIFIED SITE: ICD-10-CM

## 2021-10-21 LAB
ERYTHROCYTE [DISTWIDTH] IN BLOOD BY AUTOMATED COUNT: 12 % (ref 10–15)
HCT VFR BLD AUTO: 45.5 % (ref 40–53)
HGB BLD-MCNC: 15.3 G/DL (ref 13.3–17.7)
MCH RBC QN AUTO: 31.3 PG (ref 26.5–33)
MCHC RBC AUTO-ENTMCNC: 33.6 G/DL (ref 31.5–36.5)
MCV RBC AUTO: 93 FL (ref 78–100)
PLATELET # BLD AUTO: 284 10E3/UL (ref 150–450)
RBC # BLD AUTO: 4.89 10E6/UL (ref 4.4–5.9)
WBC # BLD AUTO: 6.5 10E3/UL (ref 4–11)

## 2021-10-21 PROCEDURE — 85027 COMPLETE CBC AUTOMATED: CPT

## 2021-10-21 PROCEDURE — 84550 ASSAY OF BLOOD/URIC ACID: CPT

## 2021-10-21 PROCEDURE — 80053 COMPREHEN METABOLIC PANEL: CPT

## 2021-10-21 PROCEDURE — 36415 COLL VENOUS BLD VENIPUNCTURE: CPT

## 2021-10-22 LAB
ALBUMIN SERPL-MCNC: 4.6 G/DL (ref 3.4–5)
ALP SERPL-CCNC: 42 U/L (ref 40–150)
ALT SERPL W P-5'-P-CCNC: 130 U/L (ref 0–70)
ANION GAP SERPL CALCULATED.3IONS-SCNC: 5 MMOL/L (ref 3–14)
AST SERPL W P-5'-P-CCNC: 62 U/L (ref 0–45)
BILIRUB SERPL-MCNC: 0.9 MG/DL (ref 0.2–1.3)
BUN SERPL-MCNC: 12 MG/DL (ref 7–30)
CALCIUM SERPL-MCNC: 9.7 MG/DL (ref 8.5–10.1)
CHLORIDE BLD-SCNC: 102 MMOL/L (ref 94–109)
CO2 SERPL-SCNC: 28 MMOL/L (ref 20–32)
CREAT SERPL-MCNC: 0.85 MG/DL (ref 0.66–1.25)
GFR SERPL CREATININE-BSD FRML MDRD: >90 ML/MIN/1.73M2
GLUCOSE BLD-MCNC: 109 MG/DL (ref 70–99)
POTASSIUM BLD-SCNC: 4.6 MMOL/L (ref 3.4–5.3)
PROT SERPL-MCNC: 8.2 G/DL (ref 6.8–8.8)
SODIUM SERPL-SCNC: 135 MMOL/L (ref 133–144)
URATE SERPL-MCNC: 6.8 MG/DL (ref 3.5–7.2)

## 2021-10-25 ENCOUNTER — PATIENT OUTREACH (OUTPATIENT)
Dept: NURSING | Facility: CLINIC | Age: 36
End: 2021-10-25
Payer: COMMERCIAL

## 2021-10-25 PROBLEM — R79.89 ELEVATED LIVER FUNCTION TESTS: Status: ACTIVE | Noted: 2019-11-13

## 2021-10-25 ASSESSMENT — ACTIVITIES OF DAILY LIVING (ADL): DEPENDENT_IADLS:: INDEPENDENT

## 2021-10-25 NOTE — PROGRESS NOTES
"Clinic Care Coordination Contact    Follow Up Progress Note      Assessment: [facility] outreached to pt on this date to follow up on goal progression.     Pt reports he is doing better since last outreach adding that he did see MTM who was able to minimize medication regimen by 1 pill which pt states has been helpful.  Additionally, pt states he has begun the process for Medical group home as a result of PTSD. Pt states he is currently on leave right now and stated he recognizes his mood and stress is much improved. Pt states he has not found himself drinking to \"numb\" anymore but admits he is still drinking, but more recreationally, and not medicinally. Pt agreed to keep a pulse on this. [facility] reminded pt of a MyChart message with resources.     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 Pt reports having followed up with her 3x.     Denied any other needs at this time.     Care Gaps:    Health Maintenance Due   Topic Date Due     INFLUENZA VACCINE (1) 09/01/2021       Care Gaps Last addressed on 10/6/2021    Goals addressed this encounter:   Goals Addressed                    This Visit's Progress       Mental Health Management (pt-stated)   50%      Goal Statement: I will consider establishing with supports to further support my goal of sobriety over the next 6 months.   Date Goal set: 7/23/2021  Barriers: Time/work.  Strengths: Accepting of resources to review for options.   Date to Achieve By: 1/1/2022  Patient expressed understanding of goal: yes  Action steps to achieve this goal:  1. I will follow up with scheduled appointments as recommended  2. I will take medications as prescribed.   3. I will contact my care team with questions, concerns, support needs.   4. I will use the clinic as a resource and I understand I can contact my clinic with 24/7 after hours services available.   5. I will discuss, review, schedule and complete any recommended overdue health " maintenance with my provider/care team.  6. I will continue to outreach to care coordination as needed for additional resources or supports.   7. I will review CD treatment options and resources sent via [employer] and consider establishing with an option of interest.               Intervention/Education provided during outreach: Care Coordination contact and availability.      Outreach Frequency: monthly    Plan: Pt to continue to monitor alcohol use.. Follow up with Therapy as scheduled/recommended and continue working with legal and employer on medical alf. Care Coordinator will follow up in 4 weeks.     Randall Zapata RIGO  Clinic Care Coordinator  [facility]  [phone]  [email or web address]

## 2021-11-01 ENCOUNTER — VIRTUAL VISIT (OUTPATIENT)
Dept: ENDOCRINOLOGY | Facility: CLINIC | Age: 36
End: 2021-11-01
Attending: NURSE PRACTITIONER
Payer: COMMERCIAL

## 2021-11-01 ENCOUNTER — PRE VISIT (OUTPATIENT)
Dept: ENDOCRINOLOGY | Facility: CLINIC | Age: 36
End: 2021-11-01

## 2021-11-01 DIAGNOSIS — K76.0 FATTY LIVER: Primary | ICD-10-CM

## 2021-11-01 DIAGNOSIS — R94.5 ABNORMAL RESULTS OF LIVER FUNCTION STUDIES: ICD-10-CM

## 2021-11-01 DIAGNOSIS — N62 GYNECOMASTIA: ICD-10-CM

## 2021-11-01 DIAGNOSIS — R79.89 LOW TESTOSTERONE: ICD-10-CM

## 2021-11-01 PROCEDURE — 99205 OFFICE O/P NEW HI 60 MIN: CPT | Mod: 95 | Performed by: INTERNAL MEDICINE

## 2021-11-01 RX ORDER — ANASTROZOLE 1 MG/1
1 TABLET ORAL DAILY
Qty: 90 TABLET | Refills: 3 | Status: SHIPPED | OUTPATIENT
Start: 2021-11-01 | End: 2023-01-12

## 2021-11-01 ASSESSMENT — PATIENT HEALTH QUESTIONNAIRE - PHQ9: SUM OF ALL RESPONSES TO PHQ QUESTIONS 1-9: 16

## 2021-11-01 NOTE — LETTER
11/1/2021       RE: Mike Villalpando  [address] [place] [address]     Dear Colleague,    Thank you for referring your patient, Mike Villalpando, to the [facility] [place] [facility]. Please see a copy of my visit note below.    Maximilian is a 36 year old who is being evaluated via a billable video visit.      How would you like to obtain your AVS? MyChart  If the video visit is dropped, the invitation should be resent by: Text to cell phone: [phone]  Will anyone else be joining your video visit? No      Video-Visit Details    Type of service:  Video Visit    Start 9:05 am  End: 9:50 am  [facility]                                                                             - Endocrinology Initial Consultation -    Reason for visit/consult:  Low testosterone    Primary care provider: Krystal Rios    HPI: A 37 yo male here for the evaluation of hypogonadism.  Patient has had noticed not the groin muscle despite frequent exercise in the gym.  Found out testosterone level was low.  In June 2021 10 AM total testosterone level was 175.  Repeat 1 in July 8 AM blood draw showing total testosterone again 186.  Other labs showing prolactin 13, LH 3.5, FSH 5.3.  Also he has had elevated liver function , AST 60.  Other medical history he has essential hypertension for the past 3 years and he has been seeing mental health team for PTSD and anxiety and depression.  He also noticed breast bilateral is growing without tenderness.  He admitted he has been drinking significant amount of alcohol for the past 3 years.  He mentioned he is drinking 2 cans of low-carb beer type of alcoholic beverage 8 to 12 cans day.    Past Medical/Surgical History:  Past Medical History:   Diagnosis Date     Hypertension      PTSD (post-traumatic stress disorder)      PTSD (post-traumatic stress disorder) 03/26/2021     PTSD (post-traumatic stress  disorder) 03/01/2021     PTSD (post-traumatic stress disorder)      PTSD (post-traumatic stress disorder)      Past Surgical History:   Procedure Laterality Date     wisdom teeth         Allergies:  Allergies   Allergen Reactions     Amoxicillin      As a child     Neosporin [Neomycin-Polymyx-Gramicid]      As a child     Penicillin G      As a child     Septra [Sulfamethoxazole W/Trimethoprim]      As a child       Current Medications   Current Outpatient Medications   Medication     allopurinol (ZYLOPRIM) 100 MG tablet     amLODIPine-benazepril (LOTREL) 10-40 MG capsule     buPROPion (WELLBUTRIN XL) 300 MG 24 hr tablet     colchicine (COLCYRS) 0.6 MG tablet     escitalopram (LEXAPRO) 20 MG tablet     prazosin (MINIPRESS) 1 MG capsule     No current facility-administered medications for this visit.       Family History:  Family History   Problem Relation Age of Onset     Diabetes Mother      Hypertension Mother      Diabetes Maternal Grandfather      Coronary Artery Disease Early Onset Maternal Uncle      Prostate Cancer No family hx of        Social History:  Social History     Tobacco Use     Smoking status: Current Some Day Smoker     Types: Dip, chew, snus or snuff, Cigars, Cigars, cigarillos or filtered cigars     Smokeless tobacco: Former User     Types: Chew     Quit date: 6/1/2018     Tobacco comment: occasionally   Substance Use Topics     Alcohol use: Yes     Comment: 4 days a week, 4-6 drinks per session       ROS:  Full review of systems taken with the help of the intake sheet. Otherwise a complete 14 point review of systems was taken and is negative unless stated in the history above.      Physical Exam:   Vitals: There were no vitals taken for this visit.  BMI= There is no height or weight on file to calculate BMI.   General: well appearing, no acute distress, pleasant and conversant,   Mental Status/neuro: alert and oriented  Face: symmetrical, normal facial color  Eyes: anicteric, PERRL, no  proptosis or lid lag  Breast: mild gynecomastia by inspections.   Resp: no acute distress      Labs : I reviewed data from epic and extract and summarize the pertinent data here.   Lab Results   Component Value Date     10/21/2021     06/08/2021      Lab Results   Component Value Date    POTASSIUM 4.6 10/21/2021    POTASSIUM 4.3 06/08/2021     Lab Results   Component Value Date    CHLORIDE 102 10/21/2021    CHLORIDE 104 06/08/2021     Lab Results   Component Value Date    DOM 9.7 10/21/2021    DOM 9.3 06/08/2021     Lab Results   Component Value Date    CO2 28 10/21/2021    CO2 28 06/08/2021     Lab Results   Component Value Date    BUN 12 10/21/2021    BUN 9 06/08/2021     Lab Results   Component Value Date    CR 0.85 10/21/2021    CR 0.78 06/08/2021     Lab Results   Component Value Date     10/21/2021    GLC 91 06/08/2021     Lab Results   Component Value Date    TSH 1.57 08/09/2021     Lab Results   Component Value Date    T4 0.77 08/09/2021     No results found for: A1C    No results found for: IGF1  Lab Results   Component Value Date    LH 3.3 08/09/2021     Lab Results   Component Value Date    FSH 5.5 08/09/2021     No results found for: ESTROGEN  Lab Results   Component Value Date    PROLACTIN 13 08/09/2021           MRI Brain: I personally reviewed the original images and agree with the below reports.   No results found for this or any previous visit.          Assessment and Plan  36 year old male with hypogonadism    Hypogonadism  Other pituitary hormone normal (PRL 13), and signigicant history of EtOH take, I think due to large EtOH consumption and fatty liver can accerelate for low testosterone,     - check estradiol    - start arimidex 1 mg daily (if does not work then tesotsreone suplement) but figuring out fundamental issue (alcohol dependency is important)     - recheck total tetstosteorne and estradiol in 4 month    - Alcohol dependency: I will communicate with her PMD    RTC  with me in 4 month      Total 60 minutes spent on the date of the encounter doing chart review, history and exam, documentation and further activities as noted above.        Romi Mantilla MD  Staff Physician  Endocrinology and Metabolism  License: [id number]

## 2021-11-01 NOTE — PROGRESS NOTES
Maximilian is a 36 year old who is being evaluated via a billable video visit.      How would you like to obtain your AVS? MyChart  If the video visit is dropped, the invitation should be resent by: Text to cell phone: [phone]  Will anyone else be joining your video visit? No      Video-Visit Details    Type of service:  Video Visit    Start 9:05 am  End: 9:50 am  D[employer]ity                                                                             - Endocrinology Initial Consultation -    Reason for visit/consult:  Low testosterone    Primary care provider: Krystal Rios    HPI: A 37 yo male here for the evaluation of hypogonadism.  Patient has had noticed not the groin muscle despite frequent exercise in the gym.  Found out testosterone level was low.  In June 2021 10 AM total testosterone level was 175.  Repeat 1 in July 8 AM blood draw showing total testosterone again 186.  Other labs showing prolactin 13, LH 3.5, FSH 5.3.  Also he has had elevated liver function , AST 60.  Other medical history he has essential hypertension for the past 3 years and he has been seeing mental health team for PTSD and anxiety and depression.  He also noticed breast bilateral is growing without tenderness.  He admitted he has been drinking significant amount of alcohol for the past 3 years.  He mentioned he is drinking 2 cans of low-carb beer type of alcoholic beverage 8 to 12 cans day.    Past Medical/Surgical History:  Past Medical History:   Diagnosis Date     Hypertension      PTSD (post-traumatic stress disorder)      PTSD (post-traumatic stress disorder) 03/26/2021     PTSD (post-traumatic stress disorder) 03/01/2021     PTSD (post-traumatic stress disorder)      PTSD (post-traumatic stress disorder)      Past Surgical History:   Procedure Laterality Date     wisdom teeth         Allergies:  Allergies   Allergen Reactions     Amoxicillin      As a child     Neosporin [Neomycin-Polymyx-Gramicid]      As a child      Penicillin G      As a child     Septra [Sulfamethoxazole W/Trimethoprim]      As a child       Current Medications   Current Outpatient Medications   Medication     allopurinol (ZYLOPRIM) 100 MG tablet     amLODIPine-benazepril (LOTREL) 10-40 MG capsule     buPROPion (WELLBUTRIN XL) 300 MG 24 hr tablet     colchicine (COLCYRS) 0.6 MG tablet     escitalopram (LEXAPRO) 20 MG tablet     prazosin (MINIPRESS) 1 MG capsule     No current facility-administered medications for this visit.       Family History:  Family History   Problem Relation Age of Onset     Diabetes Mother      Hypertension Mother      Diabetes Maternal Grandfather      Coronary Artery Disease Early Onset Maternal Uncle      Prostate Cancer No family hx of        Social History:  Social History     Tobacco Use     Smoking status: Current Some Day Smoker     Types: Dip, chew, snus or snuff, Cigars, Cigars, cigarillos or filtered cigars     Smokeless tobacco: Former User     Types: Chew     Quit date: 6/1/2018     Tobacco comment: occasionally   Substance Use Topics     Alcohol use: Yes     Comment: 4 days a week, 4-6 drinks per session       ROS:  Full review of systems taken with the help of the intake sheet. Otherwise a complete 14 point review of systems was taken and is negative unless stated in the history above.      Physical Exam:   Vitals: There were no vitals taken for this visit.  BMI= There is no height or weight on file to calculate BMI.   General: well appearing, no acute distress, pleasant and conversant,   Mental Status/neuro: alert and oriented  Face: symmetrical, normal facial color  Eyes: anicteric, PERRL, no proptosis or lid lag  Breast: mild gynecomastia by inspections.   Resp: no acute distress      Labs : I reviewed data from epic and extract and summarize the pertinent data here.   Lab Results   Component Value Date     10/21/2021     06/08/2021      Lab Results   Component Value Date    POTASSIUM 4.6 10/21/2021     POTASSIUM 4.3 06/08/2021     Lab Results   Component Value Date    CHLORIDE 102 10/21/2021    CHLORIDE 104 06/08/2021     Lab Results   Component Value Date    DOM 9.7 10/21/2021    DOM 9.3 06/08/2021     Lab Results   Component Value Date    CO2 28 10/21/2021    CO2 28 06/08/2021     Lab Results   Component Value Date    BUN 12 10/21/2021    BUN 9 06/08/2021     Lab Results   Component Value Date    CR 0.85 10/21/2021    CR 0.78 06/08/2021     Lab Results   Component Value Date     10/21/2021    GLC 91 06/08/2021     Lab Results   Component Value Date    TSH 1.57 08/09/2021     Lab Results   Component Value Date    T4 0.77 08/09/2021     No results found for: A1C    No results found for: IGF1  Lab Results   Component Value Date    LH 3.3 08/09/2021     Lab Results   Component Value Date    FSH 5.5 08/09/2021     No results found for: ESTROGEN  Lab Results   Component Value Date    PROLACTIN 13 08/09/2021           MRI Brain: I personally reviewed the original images and agree with the below reports.   No results found for this or any previous visit.          Assessment and Plan  36 year old male with hypogonadism    Hypogonadism  Other pituitary hormone normal (PRL 13), and signigicant history of EtOH take, I think due to large EtOH consumption and fatty liver can accerelate for low testosterone,     - check estradiol    - start arimidex 1 mg daily (if does not work then tesotsreone suplement) but figuring out fundamental issue (alcohol dependency is important)     - recheck total tetstosteorne and estradiol in 4 month    - Alcohol dependency: I will communicate with her PMD    RTC with me in 4 month      Total 60 minutes spent on the date of the encounter doing chart review, history and exam, documentation and further activities as noted above.        Romi Mantilla MD  Staff Physician  Endocrinology and Metabolism  License: [id number]

## 2021-11-11 ENCOUNTER — PATIENT OUTREACH (OUTPATIENT)
Dept: CARE COORDINATION | Facility: CLINIC | Age: 36
End: 2021-11-11
Payer: COMMERCIAL

## 2021-11-11 ASSESSMENT — ACTIVITIES OF DAILY LIVING (ADL): DEPENDENT_IADLS:: INDEPENDENT

## 2021-11-11 NOTE — PROGRESS NOTES
Clinic Care Coordination Contact  [facility] [place]/Voicemail       Clinical Data: Received a VM from pt requesting information on treatment options.     Care Coordinator Outreach  Outreach attempted x 1.  Left message on patient's voicemail with call back information and requested return call.  Plan: Care Coordinator will send a message via [employer] for treatment options as pt VM indicates.   Care Coordinator will try to reach patient again in 3-5 business days.    Randall Zapata [facility]  Clinic Care Coordinator  [facility]  [phone]  [email or web address]

## 2021-11-18 ASSESSMENT — ACTIVITIES OF DAILY LIVING (ADL): DEPENDENT_IADLS:: INDEPENDENT

## 2021-12-13 ENCOUNTER — PATIENT OUTREACH (OUTPATIENT)
Dept: NURSING | Facility: CLINIC | Age: 36
End: 2021-12-13
Payer: COMMERCIAL

## 2021-12-13 ASSESSMENT — ACTIVITIES OF DAILY LIVING (ADL): DEPENDENT_IADLS:: INDEPENDENT

## 2021-12-13 NOTE — PROGRESS NOTES
Clinic Care Coordination Contact    Follow Up Progress Note      Assessment: [facility] outreached to pt on this date to follow up on goals and progression. Pt had indicated at one point in a VM to [facility] that he was possibly interested in treatment options. Pt states today that he has decided he doesn't want to pursue treatment at this time. Pt states he was able to quit cold turkey for 3 weeks. 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 Pt states he knows he needs to do something but he feels too distracted right now.     Pt continues with a therapist for PTSD. Pt continues to work with an  on his medical skilled nursing.  indicates they are needing a doctor's letter discussing pt condition and reason for skilled nursing. Pt states he did discuss this with one provider, but since he does not have an assigned/established PCP, they were unable to provide this. D/t the nature of pt condition [facility] recommended pt inquire with  if the letter can be from a mental health provider in which his therapist could possibly address the need for the letter. If a medical doctor is required [facility] will facilitate getting pt scheduled for an establish care appointment with an MD. Pt expressed understanding and appreciation.     Care Gaps:    Health Maintenance Due   Topic Date Due     Pneumococcal Vaccine: Pediatrics (0 to 5 Years) and At-Risk Patients (6 to 64 Years) (1 of 2 - PPSV23) Never done     INFLUENZA VACCINE (1) 09/01/2021     COVID-19 Vaccine (3 - Booster for Moderna series) 10/07/2021       Declined due to busy schedule right now.      Goals addressed this encounter:   Goals Addressed                    This Visit's Progress       Mental Health Management (pt-stated)   50%      Goal Statement: I will consider establishing with supports to further support my goal of sobriety over the  next 6 months.   Date Goal set: 7/23/2021  Barriers: Time/work.  Strengths: Accepting of resources to review for options.   Date to Achieve By: 1/1/2022  Patient expressed understanding of goal: yes  Action steps to achieve this goal:  1. I will follow up with scheduled appointments as recommended  2. I will take medications as prescribed.   3. I will contact my care team with questions, concerns, support needs.   4. I will use the clinic as a resource and I understand I can contact my clinic with 24/7 after hours services available.   5. I will discuss, review, schedule and complete any recommended overdue health maintenance with my provider/care team.  6. I will continue to outreach to care coordination as needed for additional resources or supports.   7. I will review CD treatment options and resources sent via [employer] and consider establishing with an option of interest.               Intervention/Education provided during outreach: Treatment options, Support groups. Referenced back to [employer] messages previously sent.      Outreach Frequency: monthly    Plan: Pt to continue to work with  on medical senior care and update care team on need for medical letter. Care Coordinator will follow up in 3-4 weeks.     Randall Zapata [facility]  Clinic Care Coordinator  [facility]  [facility] [place]  [phone]  [email or web address]

## 2021-12-15 ENCOUNTER — ALLIED HEALTH/NURSE VISIT (OUTPATIENT)
Dept: PEDIATRICS | Facility: CLINIC | Age: 36
End: 2021-12-15
Payer: COMMERCIAL

## 2021-12-15 VITALS — SYSTOLIC BLOOD PRESSURE: 144 MMHG | DIASTOLIC BLOOD PRESSURE: 80 MMHG

## 2021-12-15 DIAGNOSIS — Z01.30 BP CHECK: Primary | ICD-10-CM

## 2021-12-15 PROCEDURE — 99207 PR NO CHARGE NURSE ONLY: CPT | Performed by: NURSE PRACTITIONER

## 2021-12-15 NOTE — PROGRESS NOTES
Mike Villalpando was evaluated at [facility] on December 15, 2021 at which time his blood pressure was:    BP Readings from Last 3 Encounters:   12/15/21 (!) 144/80   10/06/21 (!) 164/98   08/09/21 (!) 152/100     Pulse Readings from Last 3 Encounters:   10/06/21 98   05/25/21 60   03/09/21 90       Reviewed lifestyle modifications for blood pressure control and reduction: including making healthy food choices, managing weight, getting regular exercise, smoking cessation, reducing alcohol consumption, monitoring blood pressure regularly.     Symptoms: None    BP Goal:< 140/90 mmHg    BP Assessment:  BP too high    Potential Reasons for BP too high: BP medication too low    BP Follow-Up Plan: Recheck BP in 30 days at pharmacy    Recommendation to Provider: n/a    Note completed by: Justin Fuller, PharmD  [facility]

## 2021-12-15 NOTE — Clinical Note
Mike Villalpando was evaluated in a [facility] today at which time his blood pressure was noted to be elevated. He has been advised to follow up with his primary care provider or with a nurse only visit.  We are also routing this message to his primary care provider as an FYI.

## 2021-12-16 DIAGNOSIS — M10.9 GOUT, UNSPECIFIED CAUSE, UNSPECIFIED CHRONICITY, UNSPECIFIED SITE: ICD-10-CM

## 2021-12-16 DIAGNOSIS — F32.A ANXIETY AND DEPRESSION: ICD-10-CM

## 2021-12-16 DIAGNOSIS — F41.9 ANXIETY AND DEPRESSION: ICD-10-CM

## 2021-12-16 RX ORDER — ALLOPURINOL 100 MG/1
100 TABLET ORAL DAILY
Qty: 90 TABLET | Refills: 0 | Status: CANCELLED | OUTPATIENT
Start: 2021-12-16

## 2021-12-17 ENCOUNTER — PATIENT OUTREACH (OUTPATIENT)
Dept: PEDIATRICS | Facility: CLINIC | Age: 36
End: 2021-12-17

## 2021-12-17 ENCOUNTER — OFFICE VISIT (OUTPATIENT)
Dept: PEDIATRICS | Facility: CLINIC | Age: 36
End: 2021-12-17
Payer: COMMERCIAL

## 2021-12-17 VITALS
SYSTOLIC BLOOD PRESSURE: 124 MMHG | HEART RATE: 88 BPM | BODY MASS INDEX: 34.69 KG/M2 | HEIGHT: 72 IN | DIASTOLIC BLOOD PRESSURE: 82 MMHG | TEMPERATURE: 98.5 F | WEIGHT: 256.1 LBS | OXYGEN SATURATION: 98 %

## 2021-12-17 DIAGNOSIS — F10.10 ALCOHOL ABUSE: ICD-10-CM

## 2021-12-17 DIAGNOSIS — F33.1 MAJOR DEPRESSIVE DISORDER, RECURRENT EPISODE, MODERATE (H): Primary | ICD-10-CM

## 2021-12-17 DIAGNOSIS — F43.10 PTSD (POST-TRAUMATIC STRESS DISORDER): ICD-10-CM

## 2021-12-17 DIAGNOSIS — F41.9 ANXIETY: ICD-10-CM

## 2021-12-17 DIAGNOSIS — M10.9 GOUT, UNSPECIFIED CAUSE, UNSPECIFIED CHRONICITY, UNSPECIFIED SITE: ICD-10-CM

## 2021-12-17 PROCEDURE — 96127 BRIEF EMOTIONAL/BEHAV ASSMT: CPT | Performed by: NURSE PRACTITIONER

## 2021-12-17 PROCEDURE — 99215 OFFICE O/P EST HI 40 MIN: CPT | Performed by: NURSE PRACTITIONER

## 2021-12-17 RX ORDER — AMLODIPINE BESYLATE 10 MG/1
10 TABLET ORAL DAILY
Qty: 90 TABLET | Refills: 3 | Status: CANCELLED | OUTPATIENT
Start: 2021-12-17

## 2021-12-17 RX ORDER — LOSARTAN POTASSIUM 100 MG/1
100 TABLET ORAL DAILY
Qty: 90 TABLET | Refills: 3 | Status: CANCELLED | OUTPATIENT
Start: 2021-12-17

## 2021-12-17 RX ORDER — ALLOPURINOL 100 MG/1
200 TABLET ORAL DAILY
Qty: 90 TABLET | Refills: 0 | Status: SHIPPED | OUTPATIENT
Start: 2021-12-17 | End: 2022-04-05

## 2021-12-17 ASSESSMENT — PATIENT HEALTH QUESTIONNAIRE - PHQ9
10. IF YOU CHECKED OFF ANY PROBLEMS, HOW DIFFICULT HAVE THESE PROBLEMS MADE IT FOR YOU TO DO YOUR WORK, TAKE CARE OF THINGS AT HOME, OR GET ALONG WITH OTHER PEOPLE: VERY DIFFICULT
SUM OF ALL RESPONSES TO PHQ QUESTIONS 1-9: 14
SUM OF ALL RESPONSES TO PHQ QUESTIONS 1-9: 14

## 2021-12-17 ASSESSMENT — ANXIETY QUESTIONNAIRES
1. FEELING NERVOUS, ANXIOUS, OR ON EDGE: NEARLY EVERY DAY
7. FEELING AFRAID AS IF SOMETHING AWFUL MIGHT HAPPEN: SEVERAL DAYS
7. FEELING AFRAID AS IF SOMETHING AWFUL MIGHT HAPPEN: SEVERAL DAYS
3. WORRYING TOO MUCH ABOUT DIFFERENT THINGS: SEVERAL DAYS
GAD7 TOTAL SCORE: 11
2. NOT BEING ABLE TO STOP OR CONTROL WORRYING: SEVERAL DAYS
6. BECOMING EASILY ANNOYED OR IRRITABLE: NEARLY EVERY DAY
GAD7 TOTAL SCORE: 11
GAD7 TOTAL SCORE: 11
5. BEING SO RESTLESS THAT IT IS HARD TO SIT STILL: SEVERAL DAYS
4. TROUBLE RELAXING: SEVERAL DAYS

## 2021-12-17 ASSESSMENT — MIFFLIN-ST. JEOR: SCORE: 2120.69

## 2021-12-17 NOTE — PATIENT INSTRUCTIONS
Psychiatric Crisis Resources  Crisis Text Line - text  [place]  to 507518 (standard data and text rates apply)  In the metro area, people in crisis can call **CRISIS (256428) from a mobile phone.  National Suicide Prevention Lifeline at 6-844-740-TALK (1636), Talk to Someone Now (Press 1 for  Crisis Line)  [facility] Helpline at 404-214-3586  You can go to any emergency room or call 911. Specifically, [facility] Emergency Room is excellent for psychiatrist crises, as they have a dedicated psych team.

## 2021-12-17 NOTE — TELEPHONE ENCOUNTER
"Per PCP: \"Please call pt and let him know that in order to get him off the colchicine meds, we need to increase the dose of the other one (Allopurinol). I think it's somewhat of a complicated process. Would he be willing to wait to sort that out until his mental health is better?     Pls let him know I've referred him to psychiatry. I'd like him to schedule right away. Pls provide him the number   Please ask if he and his wife would be willing to do in person visit next week?    Called pt. No answer, LVM to call back on my personal extension.     If pt calls back:  Inform pt of above  Schedule in-person OV with pt and wife next week.    Lyndon Gray, EMT at 1:13 PM on December 17, 2021   [facility]   [phone]    Called pt x 2. No answer, LVM to call back on my personal extension.    Lyndon Gray, EMT at 3:00 PM on December 17, 2021   [facility] Guide   [phone]    "

## 2021-12-17 NOTE — PROGRESS NOTES
Assessment & Plan   (F33.1) Major depressive disorder, recurrent episode, moderate (H)  (primary encounter diagnosis)  (F41.9) Anxiety  (F10.10) Alcohol abuse  Comment: Patient was placed on his second pain leave 9/22. Ran out of pain leave in October. Now currently using his PTO. Will run out in March. States they wanted him to come back, but required a psych eval, which he hasn't done. He didn't think he would pass. States he hired a  to pursue medical longterm from being a . 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 Has had thoughts of driving off a bridge on his way to work to avoid going to work. Now that he is on leave, SI is more passive, but is triggered if he has to go back to work. Due to anxiety, he's having difficulty understanding his options.   Plan:   -I've reached out to  to see if he'd qualify for disability instead of medical longterm. I think it would be better for him to go through treatment while being paid rather than retire.  -He will drop off the paperwork he is asking for (sent the wrong form via [employer]) and have his  write me a letter explaining the process he is trying to go through  -I strongly recommended an intensive outpatient treatment program for depression/ETOH. He declines at this time  -Contracted for safety. Crisis resources discussed. Long conversation about temporarily letting his wife change the code to get the guns, but he declines because he wants to have access to the guns to protect his family if needed. I asked him to bring his wife in next week so we could discuss this with her as well. He agrees. States he doesn't have any active plans to harm himself. Discussed ED, crisis hotline, and other resources.     (M10.9) Gout, unspecified cause, unspecified chronicity, unspecified site  Comment: Uric acid not at goal, but he is mentally not capable of managing  "complex instructions right now. Will hold on any changes for now. Next step would be to increase the allopurinol to get uric acid below 6, then stop the colchicine  Plan: allopurinol (ZYLOPRIM) 100 MG tablet, Uric         acid, CBC with platelets, Comprehensive         metabolic panel (BMP + Alb, Alk Phos, ALT, AST,        Total. Bili, TP)          (F43.10) PTSD (post-traumatic stress disorder)  Comment: See above.       Tobacco Cessation:   reports that he has been smoking dip, chew, snus or snuff, cigars, and cigars, cigarillos or filtered cigars. He quit smokeless tobacco use about 3 years ago.  His smokeless tobacco use included chew.  Tobacco Cessation Action Plan: Information offered: Patient not interested at this time    BMI:   Estimated body mass index is 34.98 kg/m  as calculated from the following:    Height as of this encounter: 1.822 m (5' 11.75\").    Weight as of this encounter: 116.2 kg (256 lb 1.6 oz).   Weight management plan: Discussed healthy diet and exercise guidelines    Depression Screening Follow Up    PHQ 12/17/2021   PHQ-9 Total Score 14   Q9: Thoughts of better off dead/self-harm past 2 weeks More than half the days   F/U: Thoughts of suicide or self-harm Yes   F/U: Self harm-plan No   F/U: Self-harm action No   F/U: Safety concerns No     Last PHQ-9 12/17/2021   1.  Little interest or pleasure in doing things 3   2.  Feeling down, depressed, or hopeless 2   3.  Trouble falling or staying asleep, or sleeping too much 1   4.  Feeling tired or having little energy 2   5.  Poor appetite or overeating 1   6.  Feeling bad about yourself 2   7.  Trouble concentrating 1   8.  Moving slowly or restless 0   Q9: Thoughts of better off dead/self-harm past 2 weeks 2   PHQ-9 Total Score 14   Difficulty at work, home, or with people -   In the past two weeks have you had thoughts of suicide or self harm? Yes   Do you have concerns about your personal safety or the safety of others? No   In the past 2 " "weeks have you thought about a plan or had intention to harm yourself? No   In the past 2 weeks have you acted on these thoughts in any way? No         No flowsheet data found.      Follow Up      Follow Up Actions Taken  Crisis resource information provided in the After Visit Summary  Referred patient back to mental health provider    Discussed the following ways the patient can remain in a safe environment:  remove alcohol, remove access to firearms: refuses to do this and be around others  See Patient Instructions    No follow-ups on file.    MARIA FERNANDA [facility] BERTHA Yao is a 36 year old who presents for the following health issues  accompanied     HPI     Depression and Anxiety Follow-Up    How are you doing with your depression since your last visit? Worsened learning about PTSD with therapist, feels like making things harder    How are you doing with your anxiety since your last visit?  No change    Are you having other symptoms that might be associated with depression or anxiety? Yes:  difficulty completing daily life stuff, no interest in anything, feels everything is challenging, decreased motivation    Have you had a significant life event? Job Concerns, Financial Concerns and Health Concerns     Do you have any concerns with your use of alcohol or other drugs? Yes:  pt concerned about it - talked to therapist about it as well as Randall from      Has resources for chemical dependency. Notes alcohol sleep and \"numb out.\"    Pt going through medical [facility] for work. Need provider's sign off - it is a document that was sent via [employer] on 12/8/21. Want to discuss.    Pt wants to discuss medications no real side effects (does note dry cough with one) as he still feels depressed.    See refill request from 12/16/21 for Allopurinol, colchicine and bupropion.    Social History     Tobacco Use     Smoking status: Current Some Day Smoker     Types: Dip, " chew, snus or snuff, Cigars, Cigars, cigarillos or filtered cigars     Smokeless tobacco: Former User     Types: Chew     Quit date: 6/1/2018     Tobacco comment: occasionally   Vaping Use     Vaping Use: Never used   Substance Use Topics     Alcohol use: Yes     Comment: 4 days a week, 4-6 drinks per session     Drug use: No     PHQ 7/19/2021 11/1/2021 12/17/2021   PHQ-9 Total Score 3 16 14   Q9: Thoughts of better off dead/self-harm past 2 weeks Not at all Several days More than half the days   F/U: Thoughts of suicide or self-harm - - Yes   F/U: Self harm-plan - - No   F/U: Self-harm action - - No   F/U: Safety concerns - - No     JOSE ANGEL-7 SCORE 4/5/2021 5/25/2021 7/19/2021   Total Score - 3 (minimal anxiety) -   Total Score 11 3 4     Last PHQ-9 12/17/2021   1.  Little interest or pleasure in doing things 3   2.  Feeling down, depressed, or hopeless 2   3.  Trouble falling or staying asleep, or sleeping too much 1   4.  Feeling tired or having little energy 2   5.  Poor appetite or overeating 1   6.  Feeling bad about yourself 2   7.  Trouble concentrating 1   8.  Moving slowly or restless 0   Q9: Thoughts of better off dead/self-harm past 2 weeks 2   PHQ-9 Total Score 14   Difficulty at work, home, or with people -   In the past two weeks have you had thoughts of suicide or self harm? Yes   Do you have concerns about your personal safety or the safety of others? No   In the past 2 weeks have you thought about a plan or had intention to harm yourself? No   In the past 2 weeks have you acted on these thoughts in any way? No     JOSE ANGEL-7  12/17/2021   1. Feeling nervous, anxious, or on edge 3   2. Not being able to stop or control worrying 1   3. Worrying too much about different things 1   4. Trouble relaxing 1   5. Being so restless that it is hard to sit still 1   6. Becoming easily annoyed or irritable 3   7. Feeling afraid, as if something awful might happen 1   JOSE ANGEL-7 Total Score 11   If you checked any problems,  "how difficult have they made it for you to do your work, take care of things at home, or get along with other people? -         Review of Systems   Constitutional, HEENT, cardiovascular, pulmonary, gi and gu systems are negative, except as otherwise noted.      Objective    /82 (BP Location: Right arm, Patient Position: Sitting, Cuff Size: Adult Large)   Pulse 88   Temp 98.5  F (36.9  C) (Tympanic)   Ht 1.822 m (5' 11.75\")   Wt 116.2 kg (256 lb 1.6 oz)   SpO2 98%   BMI 34.98 kg/m    Body mass index is 34.98 kg/m .  Physical Exam   GENERAL: healthy, alert and no distress  PSYCH: axious, affect flat, bouncing leg, tearful at times    Answers for HPI/ROS submitted by the patient on 12/17/2021  If you checked off any problems, how difficult have these problems made it for you to do your work, take care of things at home, or get along with other people?: Very difficult  PHQ9 TOTAL SCORE: 14  JOSE ANGEL 7 TOTAL SCORE: 11    50 minutes spent with patient, over 1/2 in counseling and coordination of care regarding the above diagnoses    "

## 2021-12-18 ASSESSMENT — PATIENT HEALTH QUESTIONNAIRE - PHQ9: SUM OF ALL RESPONSES TO PHQ QUESTIONS 1-9: 14

## 2021-12-18 ASSESSMENT — ANXIETY QUESTIONNAIRES: GAD7 TOTAL SCORE: 11

## 2021-12-20 RX ORDER — BUPROPION HYDROCHLORIDE 300 MG/1
300 TABLET ORAL EVERY MORNING
Qty: 90 TABLET | Refills: 1 | Status: SHIPPED | OUTPATIENT
Start: 2021-12-20 | End: 2022-07-15

## 2021-12-20 RX ORDER — COLCHICINE 0.6 MG/1
0.6 TABLET ORAL DAILY
Qty: 90 TABLET | Refills: 1 | Status: SHIPPED | OUTPATIENT
Start: 2021-12-20 | End: 2022-07-15

## 2021-12-20 NOTE — TELEPHONE ENCOUNTER
Routing refill request to provider for review/approval because:   Gout Agents Protocol Failed 12/16/2021 09:30 AM   Protocol Details  Has Uric Acid on file in past 12 months and value is less than 6        Uric Acid   Date Value Ref Range Status   10/21/2021 6.8 3.5 - 7.2 mg/dL Final       Nae Villalpando RN

## 2022-01-04 DIAGNOSIS — R03.0 ELEVATED BLOOD PRESSURE READING WITHOUT DIAGNOSIS OF HYPERTENSION: ICD-10-CM

## 2022-01-05 RX ORDER — AMLODIPINE AND BENAZEPRIL HYDROCHLORIDE 10; 40 MG/1; MG/1
1 CAPSULE ORAL DAILY
Qty: 30 CAPSULE | Refills: 2 | Status: SHIPPED | OUTPATIENT
Start: 2022-01-05 | End: 2022-04-06

## 2022-01-07 ENCOUNTER — MYC MEDICAL ADVICE (OUTPATIENT)
Dept: PEDIATRICS | Facility: CLINIC | Age: 37
End: 2022-01-07
Payer: COMMERCIAL

## 2022-01-07 DIAGNOSIS — F33.1 MAJOR DEPRESSIVE DISORDER, RECURRENT EPISODE, MODERATE (H): Primary | ICD-10-CM

## 2022-01-07 DIAGNOSIS — F10.10 ALCOHOL ABUSE: ICD-10-CM

## 2022-01-10 ENCOUNTER — VIRTUAL VISIT (OUTPATIENT)
Dept: PEDIATRICS | Facility: CLINIC | Age: 37
End: 2022-01-10
Payer: COMMERCIAL

## 2022-01-10 DIAGNOSIS — F33.1 MAJOR DEPRESSIVE DISORDER, RECURRENT EPISODE, MODERATE (H): Primary | ICD-10-CM

## 2022-01-10 DIAGNOSIS — F10.10 ALCOHOL ABUSE: ICD-10-CM

## 2022-01-10 DIAGNOSIS — F43.10 PTSD (POST-TRAUMATIC STRESS DISORDER): ICD-10-CM

## 2022-01-10 PROCEDURE — 99213 OFFICE O/P EST LOW 20 MIN: CPT | Mod: 95 | Performed by: NURSE PRACTITIONER

## 2022-01-10 NOTE — PROGRESS NOTES
Maximilian is a 36 year old who is being evaluated via a billable video visit.      Switched to phone visit due to technical issues on provider's end with video and microphone    Assessment & Plan     (F33.1) Major depressive disorder, recurrent episode, moderate (H)  (primary encounter diagnosis)  (F10.10) Alcohol abuse  (F43.10) PTSD (post-traumatic stress disorder)  Comment: Affect improved today.  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 While I do think that his experience as a  caused his PTSD and lead to depression, anxiety, and alcohol abuse, I don't think he has gone through with the recommended standard treatment, which would be a dual diagnosis depression/ETOH treatment program or a PTSD treatment program (intensive outpatient). He has also declined to see a psychiatrist other than one visit with an addiction specialist. He plans to seek out another MD who would fill out the paperwork.   -Again provided crisis resources, although he didn't indicate any escalation of suicidal thinking, etc  -I did recommend trialing the prazosin I previously prescribed, as he is still having PTSD nightmares  -Recommended liver US, already ordered. Increase frequency of CMP to [id number]   -Follow-up with me prn if he plans to continue care with me    No follow-ups on file.    MARIA FERNANDA [facility] BERTHA    Emili Yao is a 36 year old who presents for the following health issues   HPI   Review of Systems   Constitutional, HEENT, cardiovascular, pulmonary, gi and gu systems are negative, except as otherwise noted.      Objective           Vitals:  No vitals were obtained today due to virtual visit.    Physical Exam   PSYCH: Bright affect via phone. Appropriate mentation and speech. Did not sound distressed          Telephone visit 5  minutes

## 2022-01-31 ENCOUNTER — PATIENT OUTREACH (OUTPATIENT)
Dept: CARE COORDINATION | Facility: CLINIC | Age: 37
End: 2022-01-31
Payer: COMMERCIAL

## 2022-01-31 ASSESSMENT — ACTIVITIES OF DAILY LIVING (ADL): DEPENDENT_IADLS:: INDEPENDENT

## 2022-01-31 NOTE — LETTER
[facility] CARE COORDINATION  [address]  [place] [address]    March 17, 2022    Mike Villalpando  [address] [address]      Dear Mike,    I have been unsuccessful in reaching you since our last contact. At this time the Care Coordination team will make no further attempts to reach you, however this does not change your ability to continue receiving care from your providers at your primary care clinic. If you need additional support from a care coordinator in the future please contact me at [phone].    All of us at [facility] are invested in your health and are here to assist you in meeting your goals.     Sincerely,    Randall Zapata, [facility]  Clinic Care Coordinator  [facility] [place]  [phone]  [email or web address]

## 2022-01-31 NOTE — PROGRESS NOTES
Clinic Care Coordination Contact  [facility]/Voicemail       Clinical Data: Care Coordinator Outreach  Outreach attempted x 1.  Left message on patient's voicemail with call back information and requested return call.  Plan: Care Coordinator will try to reach patient again in 10 business days.    Randall Zapata [facility] [place]  Clinic Care Coordinator  [facility]  [facility] [place]  [phone]  [email or web address]

## 2022-02-24 ASSESSMENT — ACTIVITIES OF DAILY LIVING (ADL): DEPENDENT_IADLS:: INDEPENDENT

## 2022-02-24 NOTE — PROGRESS NOTES
Clinic Care Coordination Contact  [facility]/Voicemail    Referral Source: PCP  Clinical Data: Care Coordinator Outreach  Outreach attempted x 2.  Left message on patient's voicemail with call back information and requested return call.  Plan: Care Coordinator will try to reach patient again in 10 business days.    Randall Zapata [facility]  Clinic Care Coordinator  [facility] [place]  [phone]  [email or web address]

## 2022-03-02 ENCOUNTER — TELEPHONE (OUTPATIENT)
Dept: ENDOCRINOLOGY | Facility: CLINIC | Age: 37
End: 2022-03-02

## 2022-03-02 ENCOUNTER — VIRTUAL VISIT (OUTPATIENT)
Dept: ENDOCRINOLOGY | Facility: CLINIC | Age: 37
End: 2022-03-02
Payer: COMMERCIAL

## 2022-03-02 ENCOUNTER — PRE VISIT (OUTPATIENT)
Dept: ENDOCRINOLOGY | Facility: CLINIC | Age: 37
End: 2022-03-02

## 2022-03-02 DIAGNOSIS — F10.29 ALCOHOL DEPENDENCE WITH UNSPECIFIED ALCOHOL-INDUCED DISORDER (H): Primary | ICD-10-CM

## 2022-03-02 DIAGNOSIS — E29.1 HYPOGONADISM MALE: ICD-10-CM

## 2022-03-02 DIAGNOSIS — R74.8 ELEVATED LIVER ENZYMES: ICD-10-CM

## 2022-03-02 PROCEDURE — 99214 OFFICE O/P EST MOD 30 MIN: CPT | Mod: 95 | Performed by: INTERNAL MEDICINE

## 2022-03-02 RX ORDER — TESTOSTERONE CYPIONATE 200 MG/ML
200 INJECTION, SOLUTION INTRAMUSCULAR
Qty: 2 ML | Refills: 5 | Status: SHIPPED | OUTPATIENT
Start: 2022-03-02 | End: 2023-07-24

## 2022-03-02 NOTE — PROGRESS NOTES
Maximilian is a 37 year old who is being evaluated via a billable video visit.      How would you like to obtain your AVS? MyChart  If the video visit is dropped, the invitation should be resent by: Text to cell phone: [phone]  Will anyone else be joining your video visit? Alvina SIFUENTES

## 2022-03-02 NOTE — LETTER
3/2/2022       RE: Mike Villalpando  [address]  [place] [address]     Dear Colleague,    Thank you for referring your patient, Mike Villalpando, to the [facility] ENDOCRINOLOG[facility] [place] at [facility]. Please see a copy of my visit note below.    Maximilian is a 37 year old who is being evaluated via a billable video visit.      How would you like to obtain your AVS? MyChart  If the video visit is dropped, the invitation should be resent by: Text to cell phone: [phone]  Will anyone else be joining your video visit? No      Barby Wale MARIA Yao is a 37 year old who is being evaluated via a billable video visit.      How would you like to obtain your AVS? MyChart  If the video visit is dropped, the invitation should be resent by: Text to cell phone: [phone]  Will anyone else be joining your video visit? No      Barby SIFUENTES       Video-Visit Details    Type of service:  Video Visit    Start 9:10 am  End: 9:35 am  [facility]                                                                             - Endocrinology Follow up -    Reason for visit/consult:  Low testosterone, gynecomastia    Primary care provider: Blanca [place] Saniya        Assessment and Plan  37 year old male with hypogonadism    Hypogonadism  Other pituitary hormone normal (PRL 13), and signigicant history of EtOH take, I think due to large EtOH consumption and fatty liver can accerelate for low testosterone,   Started arimidex last visit, no significant change noticed and also he is not checking breast status as well recently.     - ok to continue arimidex 1 mg daily    - start on testosterone injection 200 mg every other week    - recheck total testosterone level prior to start meds (as baseline level)     Elevated LFTs  May due to Alcohol dependency, offered him to check LFTs but he does not want to see now    Alcohol dependency  He reduced EtOH dose,  however back to previous high dose again for the past 2-3 months, patient is aware he needs therapy intervention    RTC with me in 6 month       Total 30 minutes spent on the date of the encounter doing chart review, history and exam, documentation and further activities as noted above.        Romi Mantilla MD  Staff Physician  Endocrinology and Metabolism  License: [id number]    Interval History as of 3/2/2022 :Started arimidex last visit, no significant change noticed and also he is not checking breast status as well recently. Back to high dose of alcohol take recently due to stress per patient.   HPI: A 35 yo male here for the evaluation of hypogonadism.  Patient has had noticed not the groin muscle despite frequent exercise in the gym.  Found out testosterone level was low.  In June 2021 10 AM total testosterone level was 175.  Repeat 1 in July 8 AM blood draw showing total testosterone again 186.  Other labs showing prolactin 13, LH 3.5, FSH 5.3.  Also he has had elevated liver function , AST 60.  Other medical history he has essential hypertension for the past 3 years and he has been seeing mental health team for PTSD and anxiety and depression.  He also noticed breast bilateral is growing without tenderness.  He admitted he has been drinking significant amount of alcohol for the past 3 years.  He mentioned he is drinking 2 cans of low-carb beer type of alcoholic beverage 8 to 12 cans day.    Past Medical/Surgical History:  Past Medical History:   Diagnosis Date     Hypertension      PTSD (post-traumatic stress disorder)      PTSD (post-traumatic stress disorder) 03/26/2021     PTSD (post-traumatic stress disorder) 03/01/2021     PTSD (post-traumatic stress disorder)      PTSD (post-traumatic stress disorder)      Past Surgical History:   Procedure Laterality Date     wisdom teeth         Allergies:  Allergies   Allergen Reactions     Amoxicillin      As a child     Neosporin [Neomycin-Polymyx-Gramicid]       As a child     Penicillin G      As a child     Septra [Sulfamethoxazole W/Trimethoprim]      As a child       Current Medications   Current Outpatient Medications   Medication     allopurinol (ZYLOPRIM) 100 MG tablet     amLODIPine-benazepril (LOTREL) 10-40 MG capsule     anastrozole (ARIMIDEX) 1 MG tablet     buPROPion (WELLBUTRIN XL) 300 MG 24 hr tablet     colchicine (COLCYRS) 0.6 MG tablet     escitalopram (LEXAPRO) 20 MG tablet     prazosin (MINIPRESS) 1 MG capsule     No current facility-administered medications for this visit.       Family History:  Family History   Problem Relation Age of Onset     Diabetes Mother      Hypertension Mother      Diabetes Maternal Grandfather      Coronary Artery Disease Early Onset Maternal Uncle      Cancer Father 66        Colon     Prostate Cancer No family hx of        Social History:  Social History     Tobacco Use     Smoking status: Current Some Day Smoker     Types: Dip, chew, snus or snuff, Cigars, Cigars, cigarillos or filtered cigars     Smokeless tobacco: Former User     Types: Chew     Quit date: 6/1/2018     Tobacco comment: occasionally   Substance Use Topics     Alcohol use: Yes     Comment: 4 days a week, 4-6 drinks per session       ROS:  Full review of systems taken with the help of the intake sheet. Otherwise a complete 14 point review of systems was taken and is negative unless stated in the history above.      Physical Exam:   Vitals: There were no vitals taken for this visit.  BMI= There is no height or weight on file to calculate BMI.   General: well appearing, no acute distress, pleasant and conversant,   Mental Status/neuro: alert and oriented  Face: symmetrical, normal facial color  Eyes: anicteric, PERRL, no proptosis or lid lag  Breast: mild gynecomastia by inspections.   Resp: no acute distress      Labs : I reviewed data from epic and extract and summarize the pertinent data here.   Lab Results   Component Value Date     10/21/2021      06/08/2021      Lab Results   Component Value Date    POTASSIUM 4.6 10/21/2021    POTASSIUM 4.3 06/08/2021     Lab Results   Component Value Date    CHLORIDE 102 10/21/2021    CHLORIDE 104 06/08/2021     Lab Results   Component Value Date    DOM 9.7 10/21/2021    DOM 9.3 06/08/2021     Lab Results   Component Value Date    CO2 28 10/21/2021    CO2 28 06/08/2021     Lab Results   Component Value Date    BUN 12 10/21/2021    BUN 9 06/08/2021     Lab Results   Component Value Date    CR 0.85 10/21/2021    CR 0.78 06/08/2021     Lab Results   Component Value Date     10/21/2021    GLC 91 06/08/2021     Lab Results   Component Value Date    TSH 1.57 08/09/2021     Lab Results   Component Value Date    T4 0.77 08/09/2021     No results found for: A1C    No results found for: IGF1  Lab Results   Component Value Date    LH 3.3 08/09/2021     Lab Results   Component Value Date    FSH 5.5 08/09/2021     No results found for: ESTROGEN  Lab Results   Component Value Date    PROLACTIN 13 08/09/2021       MRI Brain: I personally reviewed the original images and agree with the below reports.   No results found for this or any previous visit.

## 2022-03-02 NOTE — PROGRESS NOTES
Maximilian is a 37 year old who is being evaluated via a billable video visit.      How would you like to obtain your AVS? MyChart  If the video visit is dropped, the invitation should be resent by: Text to cell phone: [phone]  Will anyone else be joining your video visit? Alvina Dior Wale MARIA       Video-Visit Details    Type of service:  Video Visit    Start 9:10 am  End: 9:35 am  [facility]                                                                             - Endocrinology Follow up -    Reason for visit/consult:  Low testosterone, gynecomastia    Primary care provider: Krystal Rios        Assessment and Plan  37 year old male with hypogonadism    Hypogonadism  Other pituitary hormone normal (PRL 13), and signigicant history of EtOH take, I think due to large EtOH consumption and fatty liver can accerelate for low testosterone,   Started arimidex last visit, no significant change noticed and also he is not checking breast status as well recently.     - ok to continue arimidex 1 mg daily    - start on testosterone injection 200 mg every other week    - recheck total testosterone level prior to start meds (as baseline level)     Elevated LFTs  May due to Alcohol dependency, offered him to check LFTs but he does not want to see now    Alcohol dependency  He reduced EtOH dose, however back to previous high dose again for the past 2-3 months, patient is aware he needs therapy intervention    RTC with me in 6 month       Total 30 minutes spent on the date of the encounter doing chart review, history and exam, documentation and further activities as noted above.        Romi Mantilla MD  Staff Physician  Endocrinology and Metabolism  License: [id number]    Interval History as of 3/2/2022 :Started arimidex last visit, no significant change noticed and also he is not checking breast status as well recently. Back to high dose of alcohol take recently due to stress per patient.   HPI: A 37 yo male here  for the evaluation of hypogonadism.  Patient has had noticed not the groin muscle despite frequent exercise in the gym.  Found out testosterone level was low.  In June 2021 10 AM total testosterone level was 175.  Repeat 1 in July 8 AM blood draw showing total testosterone again 186.  Other labs showing prolactin 13, LH 3.5, FSH 5.3.  Also he has had elevated liver function , AST 60.  Other medical history he has essential hypertension for the past 3 years and he has been seeing mental health team for PTSD and anxiety and depression.  He also noticed breast bilateral is growing without tenderness.  He admitted he has been drinking significant amount of alcohol for the past 3 years.  He mentioned he is drinking 2 cans of low-carb beer type of alcoholic beverage 8 to 12 cans day.    Past Medical/Surgical History:  Past Medical History:   Diagnosis Date     Hypertension      PTSD (post-traumatic stress disorder)      PTSD (post-traumatic stress disorder) 03/26/2021     PTSD (post-traumatic stress disorder) 03/01/2021     PTSD (post-traumatic stress disorder)      PTSD (post-traumatic stress disorder)      Past Surgical History:   Procedure Laterality Date     wisdom teeth         Allergies:  Allergies   Allergen Reactions     Amoxicillin      As a child     Neosporin [Neomycin-Polymyx-Gramicid]      As a child     Penicillin G      As a child     Septra [Sulfamethoxazole W/Trimethoprim]      As a child       Current Medications   Current Outpatient Medications   Medication     allopurinol (ZYLOPRIM) 100 MG tablet     amLODIPine-benazepril (LOTREL) 10-40 MG capsule     anastrozole (ARIMIDEX) 1 MG tablet     buPROPion (WELLBUTRIN XL) 300 MG 24 hr tablet     colchicine (COLCYRS) 0.6 MG tablet     escitalopram (LEXAPRO) 20 MG tablet     prazosin (MINIPRESS) 1 MG capsule     No current facility-administered medications for this visit.       Family History:  Family History   Problem Relation Age of Onset     Diabetes  Mother      Hypertension Mother      Diabetes Maternal Grandfather      Coronary Artery Disease Early Onset Maternal Uncle      Cancer Father 66        Colon     Prostate Cancer No family hx of        Social History:  Social History     Tobacco Use     Smoking status: Current Some Day Smoker     Types: Dip, chew, snus or snuff, Cigars, Cigars, cigarillos or filtered cigars     Smokeless tobacco: Former User     Types: Chew     Quit date: 6/1/2018     Tobacco comment: occasionally   Substance Use Topics     Alcohol use: Yes     Comment: 4 days a week, 4-6 drinks per session       ROS:  Full review of systems taken with the help of the intake sheet. Otherwise a complete 14 point review of systems was taken and is negative unless stated in the history above.      Physical Exam:   Vitals: There were no vitals taken for this visit.  BMI= There is no height or weight on file to calculate BMI.   General: well appearing, no acute distress, pleasant and conversant,   Mental Status/neuro: alert and oriented  Face: symmetrical, normal facial color  Eyes: anicteric, PERRL, no proptosis or lid lag  Breast: mild gynecomastia by inspections through video  Resp: no acute distress      Labs : I reviewed data from epic and extract and summarize the pertinent data here.   Lab Results   Component Value Date     10/21/2021     06/08/2021      Lab Results   Component Value Date    POTASSIUM 4.6 10/21/2021    POTASSIUM 4.3 06/08/2021     Lab Results   Component Value Date    CHLORIDE 102 10/21/2021    CHLORIDE 104 06/08/2021     Lab Results   Component Value Date    DOM 9.7 10/21/2021    DOM 9.3 06/08/2021     Lab Results   Component Value Date    CO2 28 10/21/2021    CO2 28 06/08/2021     Lab Results   Component Value Date    BUN 12 10/21/2021    BUN 9 06/08/2021     Lab Results   Component Value Date    CR 0.85 10/21/2021    CR 0.78 06/08/2021     Lab Results   Component Value Date     10/21/2021    GLC 91 06/08/2021      Lab Results   Component Value Date    TSH 1.57 08/09/2021     Lab Results   Component Value Date    T4 0.77 08/09/2021     No results found for: A1C    No results found for: IGF1  Lab Results   Component Value Date    LH 3.3 08/09/2021     Lab Results   Component Value Date    FSH 5.5 08/09/2021     No results found for: ESTROGEN  Lab Results   Component Value Date    PROLACTIN 13 08/09/2021           MRI Brain: I personally reviewed the original images and agree with the below reports.   No results found for this or any previous visit.

## 2022-03-04 ENCOUNTER — TELEPHONE (OUTPATIENT)
Dept: ENDOCRINOLOGY | Facility: CLINIC | Age: 37
End: 2022-03-04
Payer: COMMERCIAL

## 2022-03-05 ENCOUNTER — TELEPHONE (OUTPATIENT)
Dept: ENDOCRINOLOGY | Facility: CLINIC | Age: 37
End: 2022-03-05
Payer: COMMERCIAL

## 2022-03-05 NOTE — TELEPHONE ENCOUNTER
PA Initiation    Medication: testosterone cypionate (DEPOTESTOSTERONE) 200 MG/ML injection   Insurance Company: [employer] - Phone [phone] Fax [phone]  Pharmacy Filling the Rx: [facility] - [address]   Filling Pharmacy Phone: [phone]  Filling Pharmacy Fax: [phone]  Start Date: 3/4/2022

## 2022-03-05 NOTE — TELEPHONE ENCOUNTER
The pharmacy received a fax stating that Maximilian's prior authorization for Testosterone 200 mg/mL has been approved. The prescription looks like the Testosterone will be self-administered by the patient. If that is the case can the pharmacy please get prescriptions sent in for the supplies Maximilian will need to administer (syringes and needles)?    Thank you,  Savanah Lovell, [facility]

## 2022-03-07 NOTE — TELEPHONE ENCOUNTER
Prior Authorization Approval    Authorization Effective Date: 3/4/2022  Authorization Expiration Date: 3/4/2023  Medication: testosterone cypionate (DEPOTESTOSTERONE) 200 MG/ML injection--APPROVED  Approved Dose/Quantity:   Reference #:     Insurance Company: [employer] - Phone [phone] Fax [phone]  Expected CoPay:       CoPay Card Available:      Foundation Assistance Needed:    Which Pharmacy is filling the prescription (Not needed for infusion/clinic administered): [facility] - [address]   Pharmacy Notified: Yes  Patient Notified: Yes **Instructed pharmacy to notify patient when script is ready to /ship.**

## 2022-03-14 DIAGNOSIS — E29.1 HYPOGONADISM MALE: Primary | ICD-10-CM

## 2022-03-14 NOTE — TELEPHONE ENCOUNTER
Syringe/ needle sent to Pharmacy for testosterone injections Josy Walker RN on 3/14/2022 at 9:45 AM

## 2022-03-16 ENCOUNTER — DOCUMENTATION ONLY (OUTPATIENT)
Dept: PHARMACY | Facility: CLINIC | Age: 37
End: 2022-03-16
Payer: COMMERCIAL

## 2022-03-16 NOTE — PROGRESS NOTES
This patient is due for MTM follow-up. I called the patient to schedule an appointment.     Patient is not reachable after several attempts. We will stop reaching out to the patient at this time. Please let us know if we can assist in this patient's care in the future. Routed to PCP as JANN Rose, PharmD BCPS  Medication Therapy Management Practitioner   #[phone]

## 2022-03-17 ASSESSMENT — ACTIVITIES OF DAILY LIVING (ADL): DEPENDENT_IADLS:: INDEPENDENT

## 2022-03-17 NOTE — PROGRESS NOTES
Clinic Care Coordination Contact  [facility]/Voicemail    Referral Source: PCP  Clinical Data: Care Coordinator Outreach  Outreach attempted x 3.  Left message on patient's voicemail with call back information and requested return call.  Plan: Care Coordinator will send disenrollment letter with care coordinator contact information via [employer]. Care Coordinator will do no further outreaches at this time.    Randall Zapata [facility]  Clinic Care Coordinator  [facility]  [facility] [place]  [phone]  [email or web address]

## 2022-03-28 DIAGNOSIS — M10.9 GOUT, UNSPECIFIED CAUSE, UNSPECIFIED CHRONICITY, UNSPECIFIED SITE: ICD-10-CM

## 2022-03-30 NOTE — TELEPHONE ENCOUNTER
Routing refill request to provider for review/approval because:  Labs out of range:  Uric Acid > 6.    Uric Acid   Date Value Ref Range Status   10/21/2021 6.8 3.5 - 7.2 mg/dL Final      Yoko Sidhu RN

## 2022-03-30 NOTE — TELEPHONE ENCOUNTER
Please see uric acid result note from October and determine based on chart review and/or speaking with the pt if he has followed through on the recommendations and how his gout is doing.

## 2022-03-31 ENCOUNTER — TELEPHONE (OUTPATIENT)
Dept: NURSING | Facility: CLINIC | Age: 37
End: 2022-03-31
Payer: COMMERCIAL

## 2022-03-31 NOTE — TELEPHONE ENCOUNTER
Called and left message for patient requesting a call back.     Upon chart review patient was seen by Ida Pantoja on 12/17/21 and was instructed to not change his allopurinol medication regime at this time due to continuing to drink. Patient has not come in to have his uric acid level rechecked.     Mari Moreno RN on 3/31/2022 at 9:53 AM

## 2022-03-31 NOTE — TELEPHONE ENCOUNTER
Please see refill encounter from 3/28/22, informed refill team.     Tiff Hernandez, CHG  [phone]

## 2022-03-31 NOTE — TELEPHONE ENCOUNTER
Pt is calling.    Returning a call.  See telephone encounter from today.     Upon chart review patient was seen by Ida Pantoja on 12/17/21 and was instructed to not change his allopurinol medication regime at this time due to continuing to drink. Patient has not come in to have his uric acid level rechecked.     I advised him to make a lab only appointment to have all of his labs done. He stated that he will do that now.    Appt made for labs.    Lien Serrato RN  [facility] Nurse Advisor  3/31/2022 at 12:26 PM

## 2022-03-31 NOTE — TELEPHONE ENCOUNTER
Please see encounter from 3/31/2022. Patient scheduled lab appointment.     Tiff Hernandez [facility]  [phone]

## 2022-04-05 ENCOUNTER — LAB (OUTPATIENT)
Dept: LAB | Facility: CLINIC | Age: 37
End: 2022-04-05
Payer: COMMERCIAL

## 2022-04-05 DIAGNOSIS — M10.9 GOUT, UNSPECIFIED CAUSE, UNSPECIFIED CHRONICITY, UNSPECIFIED SITE: ICD-10-CM

## 2022-04-05 DIAGNOSIS — R03.0 ELEVATED BLOOD PRESSURE READING WITHOUT DIAGNOSIS OF HYPERTENSION: ICD-10-CM

## 2022-04-05 DIAGNOSIS — R79.89 LOW TESTOSTERONE: ICD-10-CM

## 2022-04-05 LAB
ALBUMIN SERPL-MCNC: 4.7 G/DL (ref 3.4–5)
ALP SERPL-CCNC: 57 U/L (ref 40–150)
ALT SERPL W P-5'-P-CCNC: 134 U/L (ref 0–70)
ANION GAP SERPL CALCULATED.3IONS-SCNC: 7 MMOL/L (ref 3–14)
AST SERPL W P-5'-P-CCNC: 56 U/L (ref 0–45)
BILIRUB SERPL-MCNC: 0.9 MG/DL (ref 0.2–1.3)
BUN SERPL-MCNC: 12 MG/DL (ref 7–30)
CALCIUM SERPL-MCNC: 10.3 MG/DL (ref 8.5–10.1)
CHLORIDE BLD-SCNC: 101 MMOL/L (ref 94–109)
CO2 SERPL-SCNC: 27 MMOL/L (ref 20–32)
CREAT SERPL-MCNC: 0.92 MG/DL (ref 0.66–1.25)
ERYTHROCYTE [DISTWIDTH] IN BLOOD BY AUTOMATED COUNT: 11.9 % (ref 10–15)
ESTRADIOL SERPL-MCNC: 16 PG/ML
GFR SERPL CREATININE-BSD FRML MDRD: >90 ML/MIN/1.73M2
GLUCOSE BLD-MCNC: 112 MG/DL (ref 70–99)
HCT VFR BLD AUTO: 49.4 % (ref 40–53)
HGB BLD-MCNC: 16.9 G/DL (ref 13.3–17.7)
MCH RBC QN AUTO: 31.6 PG (ref 26.5–33)
MCHC RBC AUTO-ENTMCNC: 34.2 G/DL (ref 31.5–36.5)
MCV RBC AUTO: 93 FL (ref 78–100)
PLATELET # BLD AUTO: 333 10E3/UL (ref 150–450)
POTASSIUM BLD-SCNC: 4.4 MMOL/L (ref 3.4–5.3)
PROT SERPL-MCNC: 8.6 G/DL (ref 6.8–8.8)
RBC # BLD AUTO: 5.34 10E6/UL (ref 4.4–5.9)
SODIUM SERPL-SCNC: 135 MMOL/L (ref 133–144)
URATE SERPL-MCNC: 10.1 MG/DL (ref 3.5–7.2)
WBC # BLD AUTO: 8.4 10E3/UL (ref 4–11)

## 2022-04-05 PROCEDURE — 85027 COMPLETE CBC AUTOMATED: CPT

## 2022-04-05 PROCEDURE — 84403 ASSAY OF TOTAL TESTOSTERONE: CPT

## 2022-04-05 PROCEDURE — 80053 COMPREHEN METABOLIC PANEL: CPT

## 2022-04-05 PROCEDURE — 84550 ASSAY OF BLOOD/URIC ACID: CPT

## 2022-04-05 PROCEDURE — 82670 ASSAY OF TOTAL ESTRADIOL: CPT

## 2022-04-05 PROCEDURE — 36415 COLL VENOUS BLD VENIPUNCTURE: CPT

## 2022-04-05 RX ORDER — ALLOPURINOL 100 MG/1
200 TABLET ORAL DAILY
Qty: 90 TABLET | Refills: 0 | Status: SHIPPED | OUTPATIENT
Start: 2022-04-05 | End: 2022-06-28

## 2022-04-05 NOTE — TELEPHONE ENCOUNTER
Should we refuse this refill until after patient's lab appointment? Mari Moreno RN on 4/5/2022 at 12:09 PM

## 2022-04-06 RX ORDER — AMLODIPINE AND BENAZEPRIL HYDROCHLORIDE 10; 40 MG/1; MG/1
1 CAPSULE ORAL DAILY
Qty: 30 CAPSULE | Refills: 5 | Status: SHIPPED | OUTPATIENT
Start: 2022-04-06 | End: 2022-07-15

## 2022-04-06 NOTE — TELEPHONE ENCOUNTER
Prescription approved per [facility] Refill Protocol.  Yoko Sidhu RN

## 2022-04-07 ENCOUNTER — TELEPHONE (OUTPATIENT)
Dept: PEDIATRICS | Facility: CLINIC | Age: 37
End: 2022-04-07
Payer: COMMERCIAL

## 2022-04-07 LAB — TESTOST SERPL-MCNC: 193 NG/DL (ref 240–950)

## 2022-04-07 NOTE — TELEPHONE ENCOUNTER
Called and LVM about the following:    Uric acid high. Has he been taking his gout meds? Has he been getting flares?     Liver enzymes are high. I recommend an abdominal US to evaluate level of liver damage. [facility] Radiology Scheduling [phone]. It's already ordered.     How's it going getting on disability and with mental health?       Left direct number for call back.    Jasmyne Prieto, EMT at 1:23 PM on April 7, 2022  [facility] Health Guide  [phone]

## 2022-04-08 NOTE — TELEPHONE ENCOUNTER
"Talked with pt. He is taking his gout medications and has not been having flares.    He is not interested in a US. He said he would prefer not to have the knowledge that his liver is failing.     As for the disability stand point its at a \"hurry up and wait stage\" All the paperwork has been submitted and he is just waiting.    He does talk to someone about mental health and feels he is not in need at this time for further care. States he is at a point where he cant do anything more as he can not handle it.     I gave him my direct number if he needs anything.    Routing to PCP for update.    Jasmyne Prieto, EMT at 9:24 AM on April 8, 2022  [facility] Health Guide  [phone]    "

## 2022-07-02 DIAGNOSIS — F32.A ANXIETY AND DEPRESSION: ICD-10-CM

## 2022-07-02 DIAGNOSIS — F41.9 ANXIETY AND DEPRESSION: ICD-10-CM

## 2022-07-06 NOTE — TELEPHONE ENCOUNTER
MA/ to assist with updating needed screenings and/or scheduling follow up appointment.     Routing refill request to provider for review/approval because:  SSRIs Protocol Failed 07/02/2022 12:41 PM   Protocol Details  PHQ-9 score less than 5 in past 6 months    Recent (6 mo) or future (30 days) visit within the authorizing provider's specialty     PHQ 7/19/2021 11/1/2021 12/17/2021   PHQ-9 Total Score 3 16 14   Q9: Thoughts of better off dead/self-harm past 2 weeks Not at all Several days More than half the days   F/U: Thoughts of suicide or self-harm - - Yes   F/U: Self harm-plan - - No   F/U: Self-harm action - - No   F/U: Safety concerns - - No         Nae Villalpando RN

## 2022-07-10 ENCOUNTER — HEALTH MAINTENANCE LETTER (OUTPATIENT)
Age: 37
End: 2022-07-10

## 2022-07-11 RX ORDER — BUPROPION HYDROCHLORIDE 300 MG/1
300 TABLET ORAL EVERY MORNING
Qty: 90 TABLET | Refills: 1 | Status: CANCELLED | OUTPATIENT
Start: 2022-07-11

## 2022-07-11 NOTE — TELEPHONE ENCOUNTER
Patient scheduled appointment for 7/15 and has enough meds to last until appointment. Please see encounter on 7/6/22.     Canceled med and closing encounter.     Tiff Hernandez, IRA  [phone]

## 2022-07-14 ENCOUNTER — ALLIED HEALTH/NURSE VISIT (OUTPATIENT)
Dept: PEDIATRICS | Facility: CLINIC | Age: 37
End: 2022-07-14
Payer: COMMERCIAL

## 2022-07-14 VITALS — SYSTOLIC BLOOD PRESSURE: 138 MMHG | DIASTOLIC BLOOD PRESSURE: 72 MMHG

## 2022-07-14 DIAGNOSIS — Z01.30 BP CHECK: Primary | ICD-10-CM

## 2022-07-14 PROCEDURE — 99207 PR NO CHARGE NURSE ONLY: CPT | Performed by: NURSE PRACTITIONER

## 2022-07-14 NOTE — PROGRESS NOTES
Mike Villalpando was evaluated at [facility] on July 14, 2022 at which time his blood pressure was:    BP Readings from Last 3 Encounters:   07/14/22 138/72   12/17/21 124/82   12/15/21 (!) 144/80     Pulse Readings from Last 3 Encounters:   12/17/21 88   10/06/21 98   05/25/21 60       Reviewed lifestyle modifications for blood pressure control and reduction: including making healthy food choices, managing weight, getting regular exercise, smoking cessation, reducing alcohol consumption, monitoring blood pressure regularly.     Symptoms: None    BP Goal:< 140/90 mmHg    BP Assessment:  BP at goal    Potential Reasons for BP too high: NA - Not applicable    BP Follow-Up Plan: Recheck BP in 6 months at pharmacy    Recommendation to Provider: pt in pharmacy for BP check today. He reports taking medication daily and no recent changes to regimen. Still reports daily life stress but greatly different than in the past.     Note completed by:   Priscilla Zavala, Jorge  [facility]  [phone]

## 2022-07-15 ENCOUNTER — OFFICE VISIT (OUTPATIENT)
Dept: PEDIATRICS | Facility: CLINIC | Age: 37
End: 2022-07-15
Payer: COMMERCIAL

## 2022-07-15 VITALS
SYSTOLIC BLOOD PRESSURE: 142 MMHG | TEMPERATURE: 97.5 F | OXYGEN SATURATION: 96 % | HEIGHT: 72 IN | RESPIRATION RATE: 18 BRPM | HEART RATE: 85 BPM | BODY MASS INDEX: 32.78 KG/M2 | DIASTOLIC BLOOD PRESSURE: 86 MMHG | WEIGHT: 242 LBS

## 2022-07-15 DIAGNOSIS — M10.9 GOUT, UNSPECIFIED CAUSE, UNSPECIFIED CHRONICITY, UNSPECIFIED SITE: ICD-10-CM

## 2022-07-15 DIAGNOSIS — F10.20 ALCOHOLISM (H): ICD-10-CM

## 2022-07-15 DIAGNOSIS — F33.9 EPISODE OF RECURRENT MAJOR DEPRESSIVE DISORDER, UNSPECIFIED DEPRESSION EPISODE SEVERITY (H): Primary | ICD-10-CM

## 2022-07-15 DIAGNOSIS — F43.10 PTSD (POST-TRAUMATIC STRESS DISORDER): ICD-10-CM

## 2022-07-15 DIAGNOSIS — F41.9 ANXIETY AND DEPRESSION: ICD-10-CM

## 2022-07-15 DIAGNOSIS — F32.A ANXIETY AND DEPRESSION: ICD-10-CM

## 2022-07-15 DIAGNOSIS — R03.0 ELEVATED BLOOD PRESSURE READING WITHOUT DIAGNOSIS OF HYPERTENSION: ICD-10-CM

## 2022-07-15 PROCEDURE — 80053 COMPREHEN METABOLIC PANEL: CPT | Performed by: NURSE PRACTITIONER

## 2022-07-15 PROCEDURE — 90471 IMMUNIZATION ADMIN: CPT | Performed by: NURSE PRACTITIONER

## 2022-07-15 PROCEDURE — 90715 TDAP VACCINE 7 YRS/> IM: CPT | Performed by: NURSE PRACTITIONER

## 2022-07-15 PROCEDURE — 99215 OFFICE O/P EST HI 40 MIN: CPT | Mod: 25 | Performed by: NURSE PRACTITIONER

## 2022-07-15 PROCEDURE — 84550 ASSAY OF BLOOD/URIC ACID: CPT | Performed by: NURSE PRACTITIONER

## 2022-07-15 PROCEDURE — 96127 BRIEF EMOTIONAL/BEHAV ASSMT: CPT | Performed by: NURSE PRACTITIONER

## 2022-07-15 PROCEDURE — 36415 COLL VENOUS BLD VENIPUNCTURE: CPT | Performed by: NURSE PRACTITIONER

## 2022-07-15 RX ORDER — AMLODIPINE AND BENAZEPRIL HYDROCHLORIDE 10; 40 MG/1; MG/1
1 CAPSULE ORAL DAILY
Qty: 90 CAPSULE | Refills: 3 | Status: SHIPPED | OUTPATIENT
Start: 2022-07-15 | End: 2023-07-20

## 2022-07-15 RX ORDER — COLCHICINE 0.6 MG/1
0.6 TABLET ORAL DAILY
Qty: 90 TABLET | Refills: 1 | Status: SHIPPED | OUTPATIENT
Start: 2022-07-15 | End: 2023-04-07

## 2022-07-15 RX ORDER — BUPROPION HYDROCHLORIDE 300 MG/1
300 TABLET ORAL EVERY MORNING
Qty: 90 TABLET | Refills: 3 | Status: SHIPPED | OUTPATIENT
Start: 2022-07-15 | End: 2023-07-18

## 2022-07-15 RX ORDER — ALLOPURINOL 100 MG/1
200 TABLET ORAL DAILY
Qty: 90 TABLET | Refills: 0 | Status: SHIPPED | OUTPATIENT
Start: 2022-07-15 | End: 2023-01-12

## 2022-07-15 ASSESSMENT — ANXIETY QUESTIONNAIRES
7. FEELING AFRAID AS IF SOMETHING AWFUL MIGHT HAPPEN: SEVERAL DAYS
3. WORRYING TOO MUCH ABOUT DIFFERENT THINGS: SEVERAL DAYS
6. BECOMING EASILY ANNOYED OR IRRITABLE: NEARLY EVERY DAY
7. FEELING AFRAID AS IF SOMETHING AWFUL MIGHT HAPPEN: SEVERAL DAYS
4. TROUBLE RELAXING: SEVERAL DAYS
GAD7 TOTAL SCORE: 9
2. NOT BEING ABLE TO STOP OR CONTROL WORRYING: SEVERAL DAYS
GAD7 TOTAL SCORE: 9
GAD7 TOTAL SCORE: 9
8. IF YOU CHECKED OFF ANY PROBLEMS, HOW DIFFICULT HAVE THESE MADE IT FOR YOU TO DO YOUR WORK, TAKE CARE OF THINGS AT HOME, OR GET ALONG WITH OTHER PEOPLE?: VERY DIFFICULT
1. FEELING NERVOUS, ANXIOUS, OR ON EDGE: SEVERAL DAYS
5. BEING SO RESTLESS THAT IT IS HARD TO SIT STILL: SEVERAL DAYS

## 2022-07-15 ASSESSMENT — PATIENT HEALTH QUESTIONNAIRE - PHQ9
10. IF YOU CHECKED OFF ANY PROBLEMS, HOW DIFFICULT HAVE THESE PROBLEMS MADE IT FOR YOU TO DO YOUR WORK, TAKE CARE OF THINGS AT HOME, OR GET ALONG WITH OTHER PEOPLE: VERY DIFFICULT
SUM OF ALL RESPONSES TO PHQ QUESTIONS 1-9: 15
SUM OF ALL RESPONSES TO PHQ QUESTIONS 1-9: 15

## 2022-07-15 ASSESSMENT — PAIN SCALES - GENERAL: PAINLEVEL: NO PAIN (0)

## 2022-07-15 NOTE — PATIENT INSTRUCTIONS
"Ask about alcohol/PTSD \"dual diagnosis\" program  I'll certify your for medical marijuana  Think about temporarily getting rid of your gun  "

## 2022-07-16 LAB
ALBUMIN SERPL-MCNC: 4.7 G/DL (ref 3.4–5)
ALP SERPL-CCNC: 46 U/L (ref 40–150)
ALT SERPL W P-5'-P-CCNC: 136 U/L (ref 0–70)
ANION GAP SERPL CALCULATED.3IONS-SCNC: 5 MMOL/L (ref 3–14)
AST SERPL W P-5'-P-CCNC: 65 U/L (ref 0–45)
BILIRUB SERPL-MCNC: 0.5 MG/DL (ref 0.2–1.3)
BUN SERPL-MCNC: 11 MG/DL (ref 7–30)
CALCIUM SERPL-MCNC: 9.3 MG/DL (ref 8.5–10.1)
CHLORIDE BLD-SCNC: 104 MMOL/L (ref 94–109)
CO2 SERPL-SCNC: 27 MMOL/L (ref 20–32)
CREAT SERPL-MCNC: 0.69 MG/DL (ref 0.66–1.25)
GFR SERPL CREATININE-BSD FRML MDRD: >90 ML/MIN/1.73M2
GLUCOSE BLD-MCNC: 111 MG/DL (ref 70–99)
POTASSIUM BLD-SCNC: 4.7 MMOL/L (ref 3.4–5.3)
PROT SERPL-MCNC: 7.6 G/DL (ref 6.8–8.8)
SODIUM SERPL-SCNC: 136 MMOL/L (ref 133–144)
URATE SERPL-MCNC: 6.1 MG/DL (ref 3.5–7.2)

## 2022-07-24 ENCOUNTER — TELEPHONE (OUTPATIENT)
Dept: PEDIATRICS | Facility: CLINIC | Age: 37
End: 2022-07-24

## 2022-07-24 NOTE — TELEPHONE ENCOUNTER
-Please call pt.  -Pls let him know I certified him for med marijuana. They will then approve him and reach out to him about next steps  -Please ask him if he has discussed any dual diagnosis programs with his facebook group.   -Please ask him if he would be willing to do an in person follow-up with his wife present just to discuss potential future treatment options.

## 2022-07-25 NOTE — TELEPHONE ENCOUNTER
Talked with pt. He has not talked about dual diagnosis with FB group. He does have a substance eval next week to start possible treatment.     He is will to do an apt with himself and his wife but would like me to call him next Thurs to schedule after his eval so he knows what is going on with that. Routing to provider for update.     Jasmyne Prieto, EMT at 1:27 PM on July 25, 2022  [facility] Health Guide  [phone]

## 2022-08-04 NOTE — TELEPHONE ENCOUNTER
Called pt. He did not do his eval yet but states he is going to call them back today. Does not want to make an appointment until he knows about that program. agrred that I would call him middle of next week.    Jasmyne Prieto, EMT at 8:52 AM on August 4, 2022  [facility] Health Guide  [phone]

## 2022-08-10 ENCOUNTER — MYC MEDICAL ADVICE (OUTPATIENT)
Dept: PEDIATRICS | Facility: CLINIC | Age: 37
End: 2022-08-10

## 2022-08-10 NOTE — LETTER
Thank you for choosing [facility] today for your health care needs.     [facility] is transforming primary care  At [facility], we re dedicated to constantly improve how we serve the health care needs of our patients and communities. We re currently making changes to the way we deliver care.      Changes you ll notice include:    An emphasis on building a relationship with a primary care provider    Access to a PAL (personal advocate and liaison) to help guide you with your care needs    Appointment lengths tailored to your specific needs    Greater access to a broader care team and community resources     Improved online access to your care team    Benefits of a primary care provider  If you don t have a designated primary care provider, we encourage you to get to know our care team online and find a provider you d like to see. Most of our providers have a short video on their online provider page. Visit "[email or web address] to explore our providers and locations.    Benefits of having a primary care provider include:      They get to know you - your health history, family history and goals, making it easier to make a health plan together.     You get to know them - making health-related conversations and decisions easier      Primary care doctors help you when you re sick or hurt - but also focus on keeping you healthy with preventive care and screenings.      A doctor who sees you regularly is more likely to notice changes in your health.     To help make sure you get the right care, at the right time, we include PALs, or Patient Advocate Liaisons, as part of your care team. Your PAL will be your first line of contact. They ll advocate for your needs and help you navigate our services, connecting you with care team members and community resources to ensure your care is well coordinated. You ll be introduced to a PAL in an upcoming visit.     Expanded care team access with tailored appointment  lengths  Depending on your health care needs, you may have longer or shorter appointments and see additional care team providers - including Medication Therapy Management (MTM) pharmacists, diabetes educators, behavioral health clinicians, or social workers. At times, they may be included in your visit with your provider, or you may see them individually.     Navigating [facility] and [facility] resources   We partner with [facility] and [facility] to help patients overcome challenges that can make it hard to get health care, including financial hardship, transportation or mobility concerns.     Online access to your health care records and care team  [email or web address] is our online tool that makes it easy to see your health care information and communicate with your care team.   [email or web address] allows you to:     View your health maintenance plan so you know when you re due for a preventive screening    Message your PAL or care team     View your health history and visit summaries     Schedule appointments     Complete questionnaires and eCheck-in before appointments      Get care for minor conditions from your provider with an e-visit     View and pay your bill     Sign up at [email or web address]. Once you have an account, you also can download the mobile [email or web address].     Connecting to fast and convenient care  When you need fast, convenient care - consider one of the following options:       Video Visit: A convenient care option for visiting with your provider out of the comfort of your own home. Most of the things you come to the clinic to address with your provider can now be done virtually through a video. This includes your chronic medication follow up, questions or concerns you may have, and even your annual Medicare Wellness Visit.       Phone Visit: Another convenient option for follow up of common problems that may require a more in-depth discussion with your provider.       E-visit: When you need acute care  quickly, or have a quick question about your medication, an E-visit is completed through [employer] and your provider will respond within one business day.      Jasmyne Prieto, [facility] Health Guide  [phone]      Maximilian,     It was nice talking with you today. I just wanted to send you my letter to explain my role better. If you have any doubt if you should call me or not, just call. Talk with you soon!

## 2022-08-10 NOTE — TELEPHONE ENCOUNTER
Sent Mychart.    Jasmyne Prieto, EMT at 10:04 AM on August 10, 2022  [facility] Guide  [phone]

## 2022-08-12 NOTE — TELEPHONE ENCOUNTER
Spoke with pt. Did joséal. Hasn't decided on treatment yet. He wants to do an apt with his wife but doesn't know her schedule. We discussed  On how he can make apt with or without me. I explained my role her in clinic and if he needed help with anything to please reach out to me that I am here to help him. He has my direct number. I will also mail him a letter.     Jasmyne Prieto, EMT at 9:33 AM on August 12, 2022  [facility] Health Guide  [phone]

## 2022-09-11 ENCOUNTER — HEALTH MAINTENANCE LETTER (OUTPATIENT)
Age: 37
End: 2022-09-11

## 2022-11-16 ENCOUNTER — TELEPHONE (OUTPATIENT)
Dept: ENDOCRINOLOGY | Facility: CLINIC | Age: 37
End: 2022-11-16

## 2022-11-16 ENCOUNTER — VIRTUAL VISIT (OUTPATIENT)
Dept: ENDOCRINOLOGY | Facility: CLINIC | Age: 37
End: 2022-11-16
Payer: COMMERCIAL

## 2022-11-16 DIAGNOSIS — E29.1 HYPOGONADISM MALE: ICD-10-CM

## 2022-11-16 DIAGNOSIS — Z01.411 ENCOUNTER FOR GYNECOLOGICAL EXAMINATION WITH ABNORMAL FINDING: Primary | ICD-10-CM

## 2022-11-16 DIAGNOSIS — F10.10 ALCOHOL ABUSE: ICD-10-CM

## 2022-11-16 PROCEDURE — 99214 OFFICE O/P EST MOD 30 MIN: CPT | Mod: 95 | Performed by: INTERNAL MEDICINE

## 2022-11-16 ASSESSMENT — PATIENT HEALTH QUESTIONNAIRE - PHQ9: SUM OF ALL RESPONSES TO PHQ QUESTIONS 1-9: 23

## 2022-11-16 NOTE — PROGRESS NOTES
Mike Villalpando  is being evaluated via a billabl  Maximilian is a 37 year old who is being evaluated via a billable video visit.      How would you like to obtain your AVS? MyChart  If the video visit is dropped, the invitation should be resent by: Text to cell phone: [phone]  Will anyone else be joining your video visit? No      Barby SIFUENTES       Video-Visit Details    Type of service:  Video Visit    Start 8:06 am  End: 8:20 am  Amwell                                                                             - Endocrinology Follow up -    Reason for visit/consult:  Low testosterone, gynecomastia    Primary care provider: [facility]        Assessment and Plan  37 year old male with hypogonadism    Hypogonadism  Other pituitary hormone normal (PRL 13), and signigicant history of EtOH take, I think due to large EtOH consumption and fatty liver can accerelate for low testosterone,   Started arimidex last visit, no significant change noticed and also he is not checking breast status as well recently.     -  continue arimidex 1 mg daily for now    - resume testosterone injection 200 mg every other week    - recheck total testosterone level    Elevated LFTs  May due to Alcohol dependency, offered him to check LFTs but he does not want to see again    Alcohol dependency  He reduced EtOH dose, however back to previous high dose again for the past 2-3 months, patient is aware he needs therapy intervention    RTC with me in 1 year      Total 30 minutes spent on the date of the encounter doing chart review, history and exam, documentation and further activities as noted above.        Romi Mantilla MD  Staff Physician  Endocrinology and Metabolism  License: [id number]    Interval History as of 11/16/2022 : Patient has been doing ok. . Medication compliance : not well  . New event includes : he admitted still taking alcohol occasionally. He said he has support.  Interval History as of 3/2/2022 :Started  arimidex last visit, no significant change noticed and also he is not checking breast status as well recently. Back to high dose of alcohol take recently due to stress per patient.   HPI: A 35 yo male here for the evaluation of hypogonadism.  Patient has had noticed not the groin muscle despite frequent exercise in the gym.  Found out testosterone level was low.  In June 2021 10 AM total testosterone level was 175.  Repeat 1 in July 8 AM blood draw showing total testosterone again 186.  Other labs showing prolactin 13, LH 3.5, FSH 5.3.  Also he has had elevated liver function , AST 60.  Other medical history he has essential hypertension for the past 3 years and he has been seeing mental health team for PTSD and anxiety and depression.  He also noticed breast bilateral is growing without tenderness.  He admitted he has been drinking significant amount of alcohol for the past 3 years.  He mentioned he is drinking 2 cans of low-carb beer type of alcoholic beverage 8 to 12 cans day.    Past Medical/Surgical History:  Past Medical History:   Diagnosis Date     Hypertension      PTSD (post-traumatic stress disorder)      PTSD (post-traumatic stress disorder) 03/26/2021     PTSD (post-traumatic stress disorder) 03/01/2021     PTSD (post-traumatic stress disorder)      PTSD (post-traumatic stress disorder)      Past Surgical History:   Procedure Laterality Date     wisdom teeth         Allergies:  Allergies   Allergen Reactions     Amoxicillin      As a child     Neosporin [Neomycin-Polymyx-Gramicid]      As a child     Penicillin G      As a child     Septra [Sulfamethoxazole W/Trimethoprim]      As a child       Current Medications   Current Outpatient Medications   Medication     allopurinol (ZYLOPRIM) 100 MG tablet     amLODIPine-benazepril (LOTREL) 10-40 MG capsule     anastrozole (ARIMIDEX) 1 MG tablet     buPROPion (WELLBUTRIN XL) 300 MG 24 hr tablet     colchicine (COLCYRS) 0.6 MG tablet     escitalopram  "(LEXAPRO) 20 MG tablet     syringe/needle, disp, 23G X 1\" 3 ML MISC     testosterone cypionate (DEPOTESTOSTERONE) 200 MG/ML injection     No current facility-administered medications for this visit.       Family History:  Family History   Problem Relation Age of Onset     Diabetes Mother      Hypertension Mother      Diabetes Maternal Grandfather      Coronary Artery Disease Early Onset Maternal Uncle      Cancer Father 66        Colon     Prostate Cancer No family hx of        Social History:  Social History     Tobacco Use     Smoking status: Some Days     Types: Dip, chew, snus or snuff, Cigarettes     Smokeless tobacco: Former     Types: Chew     Quit date: 6/1/2018     Tobacco comments:     occasionally   Substance Use Topics     Alcohol use: Yes     Comment: 4 days a week, 4-6 drinks per session       ROS:  Full review of systems taken with the help of the intake sheet. Otherwise a complete 14 point review of systems was taken and is negative unless stated in the history above.      Physical Exam:   Vitals: There were no vitals taken for this visit.  BMI= There is no height or weight on file to calculate BMI.   General: well appearing, no acute distress, pleasant and conversant,   Mental Status/neuro: alert and oriented  Face: symmetrical, normal facial color  Eyes: anicteric, PERRL, no proptosis or lid lag  Breast: mild gynecomastia by inspections through video  Resp: no acute distress      Labs : I reviewed data from epic and extract and summarize the pertinent data here.   Lab Results   Component Value Date     10/21/2021     06/08/2021      Lab Results   Component Value Date    POTASSIUM 4.6 10/21/2021    POTASSIUM 4.3 06/08/2021     Lab Results   Component Value Date    CHLORIDE 102 10/21/2021    CHLORIDE 104 06/08/2021     Lab Results   Component Value Date    DOM 9.7 10/21/2021    DOM 9.3 06/08/2021     Lab Results   Component Value Date    CO2 28 10/21/2021    CO2 28 06/08/2021     Lab " Results   Component Value Date    BUN 12 10/21/2021    BUN 9 06/08/2021     Lab Results   Component Value Date    CR 0.85 10/21/2021    CR 0.78 06/08/2021     Lab Results   Component Value Date     10/21/2021    GLC 91 06/08/2021     Lab Results   Component Value Date    TSH 1.57 08/09/2021     Lab Results   Component Value Date    T4 0.77 08/09/2021     No results found for: A1C    No results found for: IGF1  Lab Results   Component Value Date    LH 3.3 08/09/2021     Lab Results   Component Value Date    FSH 5.5 08/09/2021     No results found for: ESTROGEN  Lab Results   Component Value Date    PROLACTIN 13 08/09/2021           MRI Brain: I personally reviewed the original images and agree with the below reports.   No results found for this or any previous visit.

## 2022-11-16 NOTE — NURSING NOTE
Pt scored a high phq9 with positive to question #9. Patient declines triage. Patient states he has a mental health provider. Mary Cummins on 11/16/2022 at 7:58 AM

## 2022-11-16 NOTE — LETTER
11/16/2022       RE: Mike Villalpando  [address] [place] [address]     Dear Colleague,    Thank you for referring your patient, Mike Villalpando, to the [facility] ENDOCR[facility] [place] at [facility]. Please see a copy of my visit note below.    Mike Villalpando  is being evaluated via a billabl  Maximilian is a 37 year old who is being evaluated via a billable video visit.      How would you like to obtain your AVS? MyChart  If the video visit is dropped, the invitation should be resent by: Text to cell phone: [phone]  Will anyone else be joining your video visit? Alvina SIFUENTES       Video-Visit Details    Type of service:  Video Visit    Start 8:06 am  End: 8:20 am  Amwell                                                                             - Endocrinology Follow up -    Reason for visit/consult:  Low testosterone, gynecomastia    Primary care provider: [facility]        Assessment and Plan  37 year old male with hypogonadism    Hypogonadism  Other pituitary hormone normal (PRL 13), and signigicant history of EtOH take, I think due to large EtOH consumption and fatty liver can accerelate for low testosterone,   Started arimidex last visit, no significant change noticed and also he is not checking breast status as well recently.     -  continue arimidex 1 mg daily for now    - resume testosterone injection 200 mg every other week    - recheck total testosterone level    Elevated LFTs  May due to Alcohol dependency, offered him to check LFTs but he does not want to see again    Alcohol dependency  He reduced EtOH dose, however back to previous high dose again for the past 2-3 months, patient is aware he needs therapy intervention    RTC with me in 1 year      Total 30 minutes spent on the date of the encounter doing chart review, history and exam, documentation and further activities as noted  above.        Romi Mantilla MD  Staff Physician  Endocrinology and Metabolism  License: [id number]    Interval History as of 11/16/2022 : Patient has been doing ok. . Medication compliance : not well  . New event includes : he admitted still taking alcohol occasionally. He said he has support.  Interval History as of 3/2/2022 :Started arimidex last visit, no significant change noticed and also he is not checking breast status as well recently. Back to high dose of alcohol take recently due to stress per patient.   HPI: A 37 yo male here for the evaluation of hypogonadism.  Patient has had noticed not the groin muscle despite frequent exercise in the gym.  Found out testosterone level was low.  In June 2021 10 AM total testosterone level was 175.  Repeat 1 in July 8 AM blood draw showing total testosterone again 186.  Other labs showing prolactin 13, LH 3.5, FSH 5.3.  Also he has had elevated liver function , AST 60.  Other medical history he has essential hypertension for the past 3 years and he has been seeing mental health team for PTSD and anxiety and depression.  He also noticed breast bilateral is growing without tenderness.  He admitted he has been drinking significant amount of alcohol for the past 3 years.  He mentioned he is drinking 2 cans of low-carb beer type of alcoholic beverage 8 to 12 cans day.    Past Medical/Surgical History:  Past Medical History:   Diagnosis Date     Hypertension      PTSD (post-traumatic stress disorder)      PTSD (post-traumatic stress disorder) 03/26/2021     PTSD (post-traumatic stress disorder) 03/01/2021     PTSD (post-traumatic stress disorder)      PTSD (post-traumatic stress disorder)      Past Surgical History:   Procedure Laterality Date     wisdom teeth         Allergies:  Allergies   Allergen Reactions     Amoxicillin      As a child     Neosporin [Neomycin-Polymyx-Gramicid]      As a child     Penicillin G      As a child     Septra [Sulfamethoxazole  "W/Trimethoprim]      As a child       Current Medications   Current Outpatient Medications   Medication     allopurinol (ZYLOPRIM) 100 MG tablet     amLODIPine-benazepril (LOTREL) 10-40 MG capsule     anastrozole (ARIMIDEX) 1 MG tablet     buPROPion (WELLBUTRIN XL) 300 MG 24 hr tablet     colchicine (COLCYRS) 0.6 MG tablet     escitalopram (LEXAPRO) 20 MG tablet     syringe/needle, disp, 23G X 1\" 3 ML MISC     testosterone cypionate (DEPOTESTOSTERONE) 200 MG/ML injection     No current facility-administered medications for this visit.       Family History:  Family History   Problem Relation Age of Onset     Diabetes Mother      Hypertension Mother      Diabetes Maternal Grandfather      Coronary Artery Disease Early Onset Maternal Uncle      Cancer Father 66        Colon     Prostate Cancer No family hx of        Social History:  Social History     Tobacco Use     Smoking status: Some Days     Types: Dip, chew, snus or snuff, Cigarettes     Smokeless tobacco: Former     Types: Chew     Quit date: 6/1/2018     Tobacco comments:     occasionally   Substance Use Topics     Alcohol use: Yes     Comment: 4 days a week, 4-6 drinks per session       ROS:  Full review of systems taken with the help of the intake sheet. Otherwise a complete 14 point review of systems was taken and is negative unless stated in the history above.      Physical Exam:   Vitals: There were no vitals taken for this visit.  BMI= There is no height or weight on file to calculate BMI.   General: well appearing, no acute distress, pleasant and conversant,   Mental Status/neuro: alert and oriented  Face: symmetrical, normal facial color  Eyes: anicteric, PERRL, no proptosis or lid lag  Breast: mild gynecomastia by inspections through video  Resp: no acute distress      Labs : I reviewed data from epic and extract and summarize the pertinent data here.   Lab Results   Component Value Date     10/21/2021     06/08/2021      Lab Results "   Component Value Date    POTASSIUM 4.6 10/21/2021    POTASSIUM 4.3 06/08/2021     Lab Results   Component Value Date    CHLORIDE 102 10/21/2021    CHLORIDE 104 06/08/2021     Lab Results   Component Value Date    DOM 9.7 10/21/2021    DOM 9.3 06/08/2021     Lab Results   Component Value Date    CO2 28 10/21/2021    CO2 28 06/08/2021     Lab Results   Component Value Date    BUN 12 10/21/2021    BUN 9 06/08/2021     Lab Results   Component Value Date    CR 0.85 10/21/2021    CR 0.78 06/08/2021     Lab Results   Component Value Date     10/21/2021    GLC 91 06/08/2021     Lab Results   Component Value Date    TSH 1.57 08/09/2021     Lab Results   Component Value Date    T4 0.77 08/09/2021     No results found for: A1C    No results found for: IGF1  Lab Results   Component Value Date    LH 3.3 08/09/2021     Lab Results   Component Value Date    FSH 5.5 08/09/2021     No results found for: ESTROGEN  Lab Results   Component Value Date    PROLACTIN 13 08/09/2021           MRI Brain: I personally reviewed the original images and agree with the below reports.   No results found for this or any previous visit.      Romi Mantilla MD

## 2022-11-16 NOTE — TELEPHONE ENCOUNTER
Attempted to reach patient to schedule follow up in the Endocrinology Clinic. No answer, LM on VM to call office back.    Schedule with Romi Mantilla MD in about 1 year (around 11/16/2023). .     Mary Cummins, VF

## 2022-11-25 NOTE — TELEPHONE ENCOUNTER
HEIKE and sent MyChart 11/25/2022 for pt to c/back to schedule a 1 year f/up w/ Dr. Mantilla and labs per Dr. Mantilla checkout orders from visit on 11/16/2022. MAX NUMBER OF ATTEMPTS REACHED.

## 2023-01-09 DIAGNOSIS — R79.89 LOW TESTOSTERONE: ICD-10-CM

## 2023-01-09 DIAGNOSIS — M10.9 GOUT, UNSPECIFIED CAUSE, UNSPECIFIED CHRONICITY, UNSPECIFIED SITE: ICD-10-CM

## 2023-01-12 RX ORDER — ALLOPURINOL 100 MG/1
200 TABLET ORAL DAILY
Qty: 90 TABLET | Refills: 1 | Status: SHIPPED | OUTPATIENT
Start: 2023-01-12 | End: 2023-07-20

## 2023-01-12 NOTE — TELEPHONE ENCOUNTER
anastrozole (ARIMIDEX) 1 MG tablet      Last Written Prescription Date:  11/1/21  Last Fill Quantity: 90,   # refills: 3  Last Office Visit : 11/16/22  Future Office visit:  none    Routing refill request to provider for review/approval because:  Drug not on the [facility], [facility] or [facility] refill protocol or controlled substance

## 2023-01-12 NOTE — TELEPHONE ENCOUNTER
Routing refill request to provider for review/approval because:  Labs out of range:    Uric Acid   Date Value Ref Range Status   07/15/2022 6.1 3.5 - 7.2 mg/dL Final     Domenica Multani RN

## 2023-01-17 RX ORDER — ANASTROZOLE 1 MG/1
1 TABLET ORAL DAILY
Qty: 90 TABLET | Refills: 3 | Status: SHIPPED | OUTPATIENT
Start: 2023-01-17 | End: 2024-09-04

## 2023-01-23 ENCOUNTER — ALLIED HEALTH/NURSE VISIT (OUTPATIENT)
Dept: PEDIATRICS | Facility: CLINIC | Age: 38
End: 2023-01-23
Payer: COMMERCIAL

## 2023-01-23 VITALS — DIASTOLIC BLOOD PRESSURE: 80 MMHG | SYSTOLIC BLOOD PRESSURE: 142 MMHG

## 2023-01-23 DIAGNOSIS — Z01.30 BP CHECK: Primary | ICD-10-CM

## 2023-01-23 PROCEDURE — 99207 PR NO CHARGE NURSE ONLY: CPT | Performed by: NURSE PRACTITIONER

## 2023-01-23 NOTE — PROGRESS NOTES
Mike Villalpando was evaluated at [facility] on January 23, 2023 at which time his blood pressure was:    BP Readings from Last 3 Encounters:   01/23/23 (!) 142/80   07/15/22 (!) 142/86   07/14/22 138/72     Pulse Readings from Last 3 Encounters:   07/15/22 85   12/17/21 88   10/06/21 98       Reviewed lifestyle modifications for blood pressure control and reduction: including making healthy food choices, managing weight, getting regular exercise, smoking cessation, reducing alcohol consumption, monitoring blood pressure regularly.     Symptoms: None    BP Goal:< 140/90 mmHg    BP Assessment:  BP too high    Potential Reasons for BP too high: Anxiety, Tobacco use within past 30 minutes and Dose of BP medication too low    BP Follow-Up Plan: Recheck BP in 30 days at pharmacy    Recommendation to Provider: Maximilian had his BP checked in pharmacy today - 142/80 - was good in his opinion, but slightly elevated. Drinks coffee in AM and had smoked a cigarette 30 minutes prior to check. Had started smoking cigarettes last May to get off of the smokeless tobacco. Described stress and PTSD that effects his motivation and depression. On bupropion which can increase HR and BP, good for smoking cessation, but also contributing to his lack of appetite. Stated he only eats once daily. Stated that he takes all his medications daily but wasn't sure about the names of them. Was not contemplating smoking cessation at this time. Encouraged to increase physical activity with dog walks. Was not prioritizing health at this time but did come to get BP checked, so not completely avoiding. Also uses smoked cannabis, inquired if he could use edibles to reduce constricting factors , but he mentioned those did not work for his stress/PTSD/appetite. Recommended to purchase an at home cuff if motivated to check more if ever feeling symptomatic. Could consider a third BP med (hydrochlorothiazide/chlorthalidone) since he is reluctant to quit  smoking at this time.  Note completed by: Thank You~  Al Khan, Pharmacist   [facility]

## 2023-02-02 NOTE — TELEPHONE ENCOUNTER
Prescription approved per [facility] Refill Protocol.  Nae Villalpando RN     
27-Jun-2022 05:21
yes

## 2023-03-15 ENCOUNTER — ALLIED HEALTH/NURSE VISIT (OUTPATIENT)
Dept: PEDIATRICS | Facility: CLINIC | Age: 38
End: 2023-03-15
Payer: COMMERCIAL

## 2023-03-15 VITALS — DIASTOLIC BLOOD PRESSURE: 72 MMHG | SYSTOLIC BLOOD PRESSURE: 128 MMHG

## 2023-03-15 DIAGNOSIS — Z01.30 BP CHECK: Primary | ICD-10-CM

## 2023-03-15 PROCEDURE — 99207 PR NO CHARGE NURSE ONLY: CPT | Performed by: NURSE PRACTITIONER

## 2023-03-15 NOTE — PROGRESS NOTES
Mike Villalpando was evaluated at [facility] on March 15, 2023 at which time his blood pressure was:    BP Readings from Last 1 Encounters:   03/15/23 128/72     No data recorded      Reviewed lifestyle modifications for blood pressure control and reduction: including making healthy food choices, managing weight, getting regular exercise, smoking cessation, reducing alcohol consumption, monitoring blood pressure regularly.     Symptoms: None    BP Goal:< 140/90 mmHg    BP Assessment:  BP at goal    Potential Reasons for BP too high: NA - Not applicable    BP Follow-Up Plan: Recheck BP in 6 months at pharmacy    Recommendation to Provider: pt in pharmacy for BP check per MD recommendation/follow up. He reports taking BP meds, not missing doses and denies side effects. Reports less stress/job change which may be reason for lower BP numbers.     Note completed by: Priscilla Zavala RPH

## 2023-04-06 DIAGNOSIS — M10.9 GOUT, UNSPECIFIED CAUSE, UNSPECIFIED CHRONICITY, UNSPECIFIED SITE: ICD-10-CM

## 2023-04-07 RX ORDER — COLCHICINE 0.6 MG/1
0.6 TABLET ORAL DAILY
Qty: 90 TABLET | Refills: 0 | Status: SHIPPED | OUTPATIENT
Start: 2023-04-07 | End: 2023-07-10

## 2023-04-07 NOTE — TELEPHONE ENCOUNTER
Prescription approved per [facility] Refill Protocol.    Routing to prescribing provider to inform of needed labs per protocol: no CBC or uric acid on file in past 12 months    Uric Acid   Date Value Ref Range Status   07/15/2022 6.1 3.5 - 7.2 mg/dL Final     Lab Results   Component Value Date    WBC 8.4 04/05/2022    WBC 5.4 01/31/2017     Lab Results   Component Value Date    RBC 5.34 04/05/2022    RBC 5.39 01/31/2017     Lab Results   Component Value Date    HGB 16.9 04/05/2022    HGB 16.0 01/31/2017     Lab Results   Component Value Date    HCT 49.4 04/05/2022    HCT 48.0 01/31/2017     Lab Results   Component Value Date    MCV 93 04/05/2022    MCV 89 01/31/2017     Lab Results   Component Value Date    MCH 31.6 04/05/2022    MCH 29.7 01/31/2017     Lab Results   Component Value Date    MCHC 34.2 04/05/2022    MCHC 33.3 01/31/2017     Lab Results   Component Value Date    RDW 11.9 04/05/2022    RDW 12.1 01/31/2017     Lab Results   Component Value Date     04/05/2022     01/31/2017     Linda Humphrey RN

## 2023-06-13 ENCOUNTER — TELEPHONE (OUTPATIENT)
Dept: PEDIATRICS | Facility: CLINIC | Age: 38
End: 2023-06-13
Payer: COMMERCIAL

## 2023-06-13 NOTE — TELEPHONE ENCOUNTER
I cannot fill out these forms. It looks like it needs to be an MD or a licensed psychologist. The psychologist who filled out his form apparently doesn't meet qualifications.     If he would like to discuss with one of our social workers he can. I have previously placed referrals but he didn't call them back x 3 (2022)    Forms in my inbox. Please get them back to him.

## 2023-06-13 NOTE — TELEPHONE ENCOUNTER
Paperwork received and placed in the providers in basket awaiting completion. FYI the pt needs the provider to co sign these forms so they can be turned in by 6/15/23. Also the pt declined scheduling an appointment at next available. Please advise when and where to overbook if not able to complete without an appointment before the 6/15/23 deadline.   Gris Peace on 6/13/2023 at 11:13 AM

## 2023-06-13 NOTE — TELEPHONE ENCOUNTER
Forms/Letter Request    Type of form/letter: CONTINUATION OF HEALTH CARE    Have you been seen for this request: N/A    Do we have the form/letter: Yes: red folder    Who is the form from? Patient    Where did/will the form come from? Patient or family brought in       When is form/letter needed by: ASAP. DEADLINE IS Monday June 19    How would you like the form/letter returned: Fax : [phone] AND call [facility] to     Patient Notified form requests are processed in 3-5 business days:Yes    Could we send this information to you in Second Decimal or would you prefer to receive a phone call?:   Patient would prefer a phone call   Okay to leave a detailed message?: Yes at Cell number on file:    Telephone Information:   Mobile [phone]

## 2023-06-14 NOTE — TELEPHONE ENCOUNTER
Huddled with Savanah, suggested I reach out to therapist to see if she works with someone who can sign form or if she could at least send an endorsement letter showing she agrees with time off.    Called and LVM for therapist to call me back.     Tiff Hernandez, CHG  [phone]    Pt had nose surgery on Wednesday, has splints in nose, states having pretty continuous bleeding from left nostril starting today. States bleeds through guaze within 10-15mins, reports has tried holding pressure on & off all day, but bleeding hasn't stopped. Advised pt she needs to go to nearest ED NOW, she shouldn't drive, & hold pressure until seen by MD. Pt verbalizes understanding.    Reason for Disposition   [1] Bleeding present > 30 minutes AND [2] using correct method of direct pressure    Additional Information   Negative: Fainted or too weak to stand following large blood loss   Negative: Sounds like a life-threatening emergency to the triager    Protocols used: NOSEBLEED-A-

## 2023-06-14 NOTE — TELEPHONE ENCOUNTER
Spoke to pt. His therapist was able to get a psyD to co-sign. Also got his chiropractor to co-sign so he doesn't need help from us. Thanks!

## 2023-06-14 NOTE — TELEPHONE ENCOUNTER
Patient called and is extremely frustrated.     Patient doesn't understand why Ida can't consult with an MD to have the forms signed since she works so closely with them and we should all trust each other to sign off on each other's patients as needed. Informed patient that legally and with clinical procedures that is not common practice since the provider has never met nor provided care for the patient.     Patient denied CC referral and speaking to social work.     Patient was evidentially upset and crying as we hung up. Requested that I forget about the forms as the deadline is tomorrow.    Routing to PCP and Medical director to see if there is anything we can due to assist patient.      Tiff Hernandez, IRA  [phone]

## 2023-06-29 DIAGNOSIS — M10.9 GOUT, UNSPECIFIED CAUSE, UNSPECIFIED CHRONICITY, UNSPECIFIED SITE: ICD-10-CM

## 2023-06-29 DIAGNOSIS — F41.9 ANXIETY AND DEPRESSION: ICD-10-CM

## 2023-06-29 DIAGNOSIS — E29.1 HYPOGONADISM MALE: ICD-10-CM

## 2023-06-29 DIAGNOSIS — F32.A ANXIETY AND DEPRESSION: ICD-10-CM

## 2023-06-29 DIAGNOSIS — F10.10 ALCOHOL ABUSE: ICD-10-CM

## 2023-06-29 DIAGNOSIS — R03.0 ELEVATED BLOOD PRESSURE READING WITHOUT DIAGNOSIS OF HYPERTENSION: ICD-10-CM

## 2023-07-03 NOTE — TELEPHONE ENCOUNTER
Should schedule with MTM this summer and me for in person physical in November, December, January timeline. Once scheduled for both, route back to fill. Also please ask him to fill out PHQ

## 2023-07-03 NOTE — TELEPHONE ENCOUNTER
Routing refill request to provider for review/approval because:  Syringes filled by endo - forwarding the refill there for those    Routing refill request to provider for review/approval because:  Failed protocol for allopurinol - due for cbc  Failed prototcol for lexapro - phq9 > 4 and due for an appt  Pt also requesting amlodipine    Will also forward to the station, pt due for a med check - at primary care office.

## 2023-07-05 RX ORDER — ESCITALOPRAM OXALATE 20 MG/1
20 TABLET ORAL DAILY
Qty: 90 TABLET | Refills: 3 | OUTPATIENT
Start: 2023-07-05

## 2023-07-05 RX ORDER — AMLODIPINE AND BENAZEPRIL HYDROCHLORIDE 10; 40 MG/1; MG/1
1 CAPSULE ORAL DAILY
Qty: 90 CAPSULE | Refills: 3 | OUTPATIENT
Start: 2023-07-05

## 2023-07-05 RX ORDER — ALLOPURINOL 100 MG/1
200 TABLET ORAL DAILY
Qty: 90 TABLET | Refills: 1 | OUTPATIENT
Start: 2023-07-05

## 2023-07-06 ENCOUNTER — TELEPHONE (OUTPATIENT)
Dept: ENDOCRINOLOGY | Facility: CLINIC | Age: 38
End: 2023-07-06

## 2023-07-06 NOTE — TELEPHONE ENCOUNTER
Outgoing call spoke to patient.    He declined to schedule with MTM.    Did schedule with PCP - Ida Reaves.    Please refill medications.    Also patient is wanting to scheduled with Endo provider Dr. Mantilla. Sent a message to that team to reach out to assist him with scheduling that appointment.    Thank you  Oral ABDI

## 2023-07-06 NOTE — TELEPHONE ENCOUNTER
General Call      Reason for Call:  Appointment for testosterone    What are your questions or concerns:  Patient is requesting a visit with Dr. Mantilla. Has been a while since he has been seen. He would like a  from that team to reach out to him.    Date of last appointment with provider: 11/16/2022 with Dr. Mantilla    Could we send this information to you in Playspace or would you prefer to receive a phone call?:   Patient would prefer a phone call   Okay to leave a detailed message?: No at Home number on file [phone] (home)     Problem: Wound:  Goal: Will show signs of wound healing; wound closure and no evidence of infection  Will show signs of wound healing; wound closure and no evidence of infection  Outcome: Ongoing      Problem: Smoking cessation:  Goal: Ability to formulate a plan to maintain a tobacco-free life will be supported  Ability to formulate a plan to maintain a tobacco-free life will be supported  Outcome: Ongoing      Problem: Falls - Risk of:  Goal: Will remain free from falls  Will remain free from falls  Outcome: Ongoing

## 2023-07-08 DIAGNOSIS — M10.9 GOUT, UNSPECIFIED CAUSE, UNSPECIFIED CHRONICITY, UNSPECIFIED SITE: ICD-10-CM

## 2023-07-10 RX ORDER — COLCHICINE 0.6 MG/1
0.6 TABLET ORAL DAILY
Qty: 90 TABLET | Refills: 0 | Status: SHIPPED | OUTPATIENT
Start: 2023-07-10 | End: 2023-07-20

## 2023-07-10 RX ORDER — ESCITALOPRAM OXALATE 20 MG/1
20 TABLET ORAL DAILY
Qty: 90 TABLET | Refills: 0 | Status: SHIPPED | OUTPATIENT
Start: 2023-07-10 | End: 2023-07-20

## 2023-07-10 ASSESSMENT — PATIENT HEALTH QUESTIONNAIRE - PHQ9
5. POOR APPETITE OR OVEREATING: SEVERAL DAYS
SUM OF ALL RESPONSES TO PHQ QUESTIONS 1-9: 16

## 2023-07-10 ASSESSMENT — ANXIETY QUESTIONNAIRES
6. BECOMING EASILY ANNOYED OR IRRITABLE: NEARLY EVERY DAY
2. NOT BEING ABLE TO STOP OR CONTROL WORRYING: SEVERAL DAYS
3. WORRYING TOO MUCH ABOUT DIFFERENT THINGS: SEVERAL DAYS
7. FEELING AFRAID AS IF SOMETHING AWFUL MIGHT HAPPEN: MORE THAN HALF THE DAYS
IF YOU CHECKED OFF ANY PROBLEMS ON THIS QUESTIONNAIRE, HOW DIFFICULT HAVE THESE PROBLEMS MADE IT FOR YOU TO DO YOUR WORK, TAKE CARE OF THINGS AT HOME, OR GET ALONG WITH OTHER PEOPLE: VERY DIFFICULT
GAD7 TOTAL SCORE: 13
5. BEING SO RESTLESS THAT IT IS HARD TO SIT STILL: MORE THAN HALF THE DAYS
1. FEELING NERVOUS, ANXIOUS, OR ON EDGE: NEARLY EVERY DAY
GAD7 TOTAL SCORE: 13

## 2023-07-10 NOTE — TELEPHONE ENCOUNTER
Received call from patient. Patient is requesting refill for colchicine and escitalopram. See separate refill encounter 7/8/23 for colchicine.     Patient has upcoming appointment with PCP 7/20/23. Patient states he ran out of escitalopram 2 days ago. Updated PHQ-9 and JOSE ANGEL-7 with patient. Patient denies any active plan for suicide. No plan to harm himself in any way. Patient states he sees a therapist, notes he has struggled with issues for past 7 years. RN advised on crisis resources and advised nurse triage line is open 24/7. Patient verbalized understanding, feels safe to wait until appointment w/ PCP 7/20/23 to discuss further unless there is earlier available appointment. Patient is hoping for refill of escitalopram prior to appointment.     Matt CARROLL RN 7/10/2023 at 10:36 AM

## 2023-07-10 NOTE — TELEPHONE ENCOUNTER
Prescription approved per [facility] Refill Protocol.  Matt CARROLL RN 7/10/2023 at 10:32 AM

## 2023-07-13 DIAGNOSIS — F41.9 ANXIETY AND DEPRESSION: ICD-10-CM

## 2023-07-13 DIAGNOSIS — F32.A ANXIETY AND DEPRESSION: ICD-10-CM

## 2023-07-18 RX ORDER — BUPROPION HYDROCHLORIDE 300 MG/1
300 TABLET ORAL EVERY MORNING
Qty: 90 TABLET | Refills: 3 | Status: SHIPPED | OUTPATIENT
Start: 2023-07-18 | End: 2023-07-20

## 2023-07-18 NOTE — TELEPHONE ENCOUNTER
Please see refill encounter 6/29/23, note from 7/10/23 10:37am.     Matt CARROLL RN 7/18/2023 at 11:53 AM

## 2023-07-18 NOTE — TELEPHONE ENCOUNTER
Routing refill request to provider for review/approval because:  Abnormal PHQ-9      7/15/2022     9:49 AM 11/16/2022     7:49 AM 7/10/2023    10:26 AM   PHQ   PHQ-9 Total Score 15 23 16   Q9: Thoughts of better off dead/self-harm past 2 weeks Several days More than half the days Several days   F/U: Thoughts of suicide or self-harm Yes     F/U: Self harm-plan No     F/U: Self-harm action No     F/U: Safety concerns No       Mai Wong RN, BSN  [facility]

## 2023-07-20 ENCOUNTER — OFFICE VISIT (OUTPATIENT)
Dept: PEDIATRICS | Facility: CLINIC | Age: 38
End: 2023-07-20
Payer: COMMERCIAL

## 2023-07-20 DIAGNOSIS — F43.10 PTSD (POST-TRAUMATIC STRESS DISORDER): ICD-10-CM

## 2023-07-20 DIAGNOSIS — F10.21 HISTORY OF ALCOHOL DEPENDENCE (H): ICD-10-CM

## 2023-07-20 DIAGNOSIS — F41.9 ANXIETY AND DEPRESSION: ICD-10-CM

## 2023-07-20 DIAGNOSIS — E29.1 HYPOGONADISM MALE: ICD-10-CM

## 2023-07-20 DIAGNOSIS — F33.9 EPISODE OF RECURRENT MAJOR DEPRESSIVE DISORDER, UNSPECIFIED DEPRESSION EPISODE SEVERITY (H): Primary | ICD-10-CM

## 2023-07-20 DIAGNOSIS — I10 BENIGN ESSENTIAL HYPERTENSION: ICD-10-CM

## 2023-07-20 DIAGNOSIS — F32.A ANXIETY AND DEPRESSION: ICD-10-CM

## 2023-07-20 DIAGNOSIS — M10.9 GOUT, UNSPECIFIED CAUSE, UNSPECIFIED CHRONICITY, UNSPECIFIED SITE: ICD-10-CM

## 2023-07-20 DIAGNOSIS — R20.2 PARESTHESIA: ICD-10-CM

## 2023-07-20 DIAGNOSIS — R79.89 ELEVATED LIVER FUNCTION TESTS: ICD-10-CM

## 2023-07-20 PROBLEM — R03.0 ELEVATED BLOOD PRESSURE READING WITHOUT DIAGNOSIS OF HYPERTENSION: Status: RESOLVED | Noted: 2020-10-14 | Resolved: 2023-07-20

## 2023-07-20 PROBLEM — F10.10 ALCOHOL ABUSE: Status: RESOLVED | Noted: 2019-11-13 | Resolved: 2023-07-20

## 2023-07-20 LAB
ERYTHROCYTE [DISTWIDTH] IN BLOOD BY AUTOMATED COUNT: 12 % (ref 10–15)
HBA1C MFR BLD: 5.3 % (ref 0–5.6)
HCT VFR BLD AUTO: 46.6 % (ref 40–53)
HGB BLD-MCNC: 15.3 G/DL (ref 13.3–17.7)
MCH RBC QN AUTO: 31.5 PG (ref 26.5–33)
MCHC RBC AUTO-ENTMCNC: 32.8 G/DL (ref 31.5–36.5)
MCV RBC AUTO: 96 FL (ref 78–100)
PLATELET # BLD AUTO: 308 10E3/UL (ref 150–450)
RBC # BLD AUTO: 4.85 10E6/UL (ref 4.4–5.9)
WBC # BLD AUTO: 6.1 10E3/UL (ref 4–11)

## 2023-07-20 PROCEDURE — 84443 ASSAY THYROID STIM HORMONE: CPT | Performed by: NURSE PRACTITIONER

## 2023-07-20 PROCEDURE — 82607 VITAMIN B-12: CPT | Performed by: NURSE PRACTITIONER

## 2023-07-20 PROCEDURE — 84550 ASSAY OF BLOOD/URIC ACID: CPT | Performed by: NURSE PRACTITIONER

## 2023-07-20 PROCEDURE — 83036 HEMOGLOBIN GLYCOSYLATED A1C: CPT | Performed by: NURSE PRACTITIONER

## 2023-07-20 PROCEDURE — 99215 OFFICE O/P EST HI 40 MIN: CPT | Performed by: NURSE PRACTITIONER

## 2023-07-20 PROCEDURE — 36415 COLL VENOUS BLD VENIPUNCTURE: CPT | Performed by: NURSE PRACTITIONER

## 2023-07-20 PROCEDURE — 80053 COMPREHEN METABOLIC PANEL: CPT | Performed by: NURSE PRACTITIONER

## 2023-07-20 PROCEDURE — 85027 COMPLETE CBC AUTOMATED: CPT | Performed by: NURSE PRACTITIONER

## 2023-07-20 RX ORDER — ALLOPURINOL 100 MG/1
200 TABLET ORAL DAILY
Qty: 90 TABLET | Refills: 3 | Status: SHIPPED | OUTPATIENT
Start: 2023-07-20

## 2023-07-20 RX ORDER — AMLODIPINE AND BENAZEPRIL HYDROCHLORIDE 10; 40 MG/1; MG/1
1 CAPSULE ORAL DAILY
Qty: 90 CAPSULE | Refills: 3 | Status: SHIPPED | OUTPATIENT
Start: 2023-07-20 | End: 2024-09-12

## 2023-07-20 RX ORDER — BUPROPION HYDROCHLORIDE 300 MG/1
300 TABLET ORAL EVERY MORNING
Qty: 90 TABLET | Refills: 3 | Status: SHIPPED | OUTPATIENT
Start: 2023-07-20

## 2023-07-20 RX ORDER — COLCHICINE 0.6 MG/1
0.6 TABLET ORAL DAILY
Qty: 90 TABLET | Refills: 3 | Status: CANCELLED | OUTPATIENT
Start: 2023-07-20

## 2023-07-20 RX ORDER — ESCITALOPRAM OXALATE 20 MG/1
20 TABLET ORAL DAILY
Qty: 90 TABLET | Refills: 3 | Status: SHIPPED | OUTPATIENT
Start: 2023-07-20

## 2023-07-20 ASSESSMENT — ANXIETY QUESTIONNAIRES
5. BEING SO RESTLESS THAT IT IS HARD TO SIT STILL: SEVERAL DAYS
1. FEELING NERVOUS, ANXIOUS, OR ON EDGE: MORE THAN HALF THE DAYS
2. NOT BEING ABLE TO STOP OR CONTROL WORRYING: SEVERAL DAYS
6. BECOMING EASILY ANNOYED OR IRRITABLE: NEARLY EVERY DAY
4. TROUBLE RELAXING: SEVERAL DAYS
GAD7 TOTAL SCORE: 11
GAD7 TOTAL SCORE: 11
3. WORRYING TOO MUCH ABOUT DIFFERENT THINGS: MORE THAN HALF THE DAYS
IF YOU CHECKED OFF ANY PROBLEMS ON THIS QUESTIONNAIRE, HOW DIFFICULT HAVE THESE PROBLEMS MADE IT FOR YOU TO DO YOUR WORK, TAKE CARE OF THINGS AT HOME, OR GET ALONG WITH OTHER PEOPLE: VERY DIFFICULT
7. FEELING AFRAID AS IF SOMETHING AWFUL MIGHT HAPPEN: SEVERAL DAYS

## 2023-07-20 ASSESSMENT — PATIENT HEALTH QUESTIONNAIRE - PHQ9
SUM OF ALL RESPONSES TO PHQ QUESTIONS 1-9: 14
SUM OF ALL RESPONSES TO PHQ QUESTIONS 1-9: 14
10. IF YOU CHECKED OFF ANY PROBLEMS, HOW DIFFICULT HAVE THESE PROBLEMS MADE IT FOR YOU TO DO YOUR WORK, TAKE CARE OF THINGS AT HOME, OR GET ALONG WITH OTHER PEOPLE: VERY DIFFICULT

## 2023-07-20 ASSESSMENT — PAIN SCALES - GENERAL: PAINLEVEL: NO PAIN (0)

## 2023-07-20 NOTE — PROGRESS NOTES
Assessment & Plan     (F33.9) Episode of recurrent major depressive disorder, unspecified depression episode severity (H)  (primary encounter diagnosis)  -Declines covid booster    (F41.9,  F32.A) Anxiety and depression  (F43.10) PTSD (post-traumatic stress disorder)  (F10.21) History of alcohol dependence (H)  Comment: Ongoing severe anxiety, depression, PTSD sx, and ETOH. Now officially retired from police force. Using less ETOH now that he's certified for med marijuana but still having daily panic, etc. Is more open to ETOH/mental health treatment programs than ever, but still hesitant. He's wondering if getting his anxiety down a bit may help him feel more equipped to enter a program. SI is currently passive. Has a therapist.   Plan:   Plan: buPROPion (WELLBUTRIN XL) 300 MG 24 hr tablet,         escitalopram (LEXAPRO) 20 MG tablet  -Re certified for med marijuana  -Continue therapy.   -Continue lexapro and wellbutrin.   -Sent him a message with best med options for anxiety in addition to what he's taking. Start with gabapentin. If not effective, consider abilify vs buspar. I suspect buspar may not be strong enough to improve his anxiety.   -Discussed naltrexone. He would consider. Will need LFTs 1 month after starting if he wants to start this. Did discuss r/b/se so ok to send rx while I'm on leave if needed.  -He is considering treatment and he'll reach out to me. If needing referral, ok to place referral for mental health eval if needing it so they can determine most appropriate level of care. He'd be more interested in IOP or PHP vs inpatient treatment  -Contracted for safety. Crisis resources discussed. Declined to temporarily get rid of his guns, but feels safe currently    (R73.89) Elevated liver function tests  Comment: Likely due to ETOH, but will not allow further labs/ultrasound.   Plan: Hemoglobin A1c  -Agrees to hepatic panel today.     (I10) Benign essential hypertension  Comment: Borderline but  "is currently having a panic attack.   Plan: COMPREHENSIVE METABOLIC PANEL  -Asked him to monitor at home and let me know if frequently above goal    (M10.9) Gout, unspecified cause, unspecified chronicity, unspecified site  Comment: Stable. Uric acid at goal. No further episodes. Can stop colchicine  Plan: allopurinol (ZYLOPRIM) 100 MG tablet, CBC with         Platelets, Uric acid    (E29.1) Hypogonadism male  Comment: Following with endo    (R20.2) Paresthesia  Comment: Fingers. ? ETOH, MSK, other  Plan: TSH with free T4 reflex, Vitamin B12  -Asked him to monitor exactly which fingers and let me know. Can determine further workup based on that.        BMI:   Estimated body mass index is 30.8 kg/m  as calculated from the following:    Height as of this encounter: 1.822 m (5' 11.75\").    Weight as of this encounter: 102.3 kg (225 lb 8 oz).   Weight management plan: Discussed healthy diet and exercise guidelines    Depression Screening Follow Up        7/20/2023    10:44 AM   PHQ   PHQ-9 Total Score 14   Q9: Thoughts of better off dead/self-harm past 2 weeks Several days   F/U: Thoughts of suicide or self-harm No   F/U: Safety concerns No   MARIA FERNANDA GABRIEL CNP  [facility] BERTHA Yao is a 38 year old, presenting for the following health issues:  Recheck Medication          7/20/2023    10:48 AM   Additional Questions   Roomed by Hilaria Villalpando CMA     History of Present Illness       Mental Health Follow-up:  Patient presents to follow-up on Depression & Anxiety.Patient's depression since last visit has been:  No change  The patient is having other symptoms associated with depression.  Patient's anxiety since last visit has been:  Medium  The patient is having other symptoms associated with anxiety.  Any significant life events: job concerns and grief or loss  Patient is feeling anxious or having panic attacks.  Patient has concerns about alcohol or drug use.    Reason " "for visit:  Meds, renew medical MJ card    He eats 2-3 servings of fruits and vegetables daily.He consumes 0 sweetened beverage(s) daily.He exercises with enough effort to increase his heart rate 20 to 29 minutes per day.  He exercises with enough effort to increase his heart rate 3 or less days per week.   He is taking medications regularly.    Today's PHQ-9         PHQ-9 Total Score: 14    PHQ-9 Q9 Thoughts of better off dead/self-harm past 2 weeks :   Several days  Thoughts of suicide or self harm: (P) No  Self-harm Plan:     Self-harm Action:       Safety concerns for self or others: (P) No    How difficult have these problems made it for you to do your work, take care of things at home, or get along with other people: Very difficult  Today's JOSE ANGEL-7 Score: 11    Review of Systems   Constitutional, HEENT, cardiovascular, pulmonary, gi and gu systems are negative, except as otherwise noted.      Objective    BP (!) 144/68 (BP Location: Right arm, Cuff Size: Adult Large)   Pulse 100   Temp 98  F (36.7  C)   Resp 18   Ht 1.822 m (5' 11.75\")   Wt 102.3 kg (225 lb 8 oz)   SpO2 96%   BMI 30.80 kg/m    Body mass index is 30.8 kg/m .  Physical Exam   GENERAL: healthy, alert and no distress  EYES: Eyes grossly normal to inspection, PERRL and conjunctivae and sclerae normal  HENT: ear canals and TM's normal, nose and mouth without ulcers or lesions  NECK: no adenopathy, no asymmetry, masses, or scars and thyroid normal to palpation  RESP: lungs clear to auscultation - no rales, rhonchi or wheezes  CV: regular rate and rhythm, normal S1 S2, no S3 or S4, no murmur, click or rub, no peripheral edema and peripheral pulses strong  ABDOMEN: soft, nontender, no hepatosplenomegaly, no masses and bowel sounds normal  MS: no gross musculoskeletal defects noted, no edema  SKIN: no suspicious lesions or rashes  NEURO: Normal strength and tone, mentation intact and speech normal  PSYCH: mentation appears normal, affect " normal/bright    50 minutes spent with patient, over 1/2 in counseling and coordination of care regarding the above diagnoses

## 2023-07-20 NOTE — PATIENT INSTRUCTIONS
Stop colchicine  Get an arm cuff for blood pressure and check it  Ask pharmacy about text reminders or if you can switch to [employer] mail order. If switching, let me know.

## 2023-07-21 LAB
ALBUMIN SERPL BCG-MCNC: 5 G/DL (ref 3.5–5.2)
ALP SERPL-CCNC: 52 U/L (ref 40–129)
ALT SERPL W P-5'-P-CCNC: 59 U/L (ref 0–70)
ANION GAP SERPL CALCULATED.3IONS-SCNC: 12 MMOL/L (ref 7–15)
AST SERPL W P-5'-P-CCNC: 53 U/L (ref 0–45)
BILIRUB SERPL-MCNC: 0.6 MG/DL
BUN SERPL-MCNC: 14.4 MG/DL (ref 6–20)
CALCIUM SERPL-MCNC: 9.5 MG/DL (ref 8.6–10)
CHLORIDE SERPL-SCNC: 98 MMOL/L (ref 98–107)
CREAT SERPL-MCNC: 0.74 MG/DL (ref 0.67–1.17)
DEPRECATED HCO3 PLAS-SCNC: 28 MMOL/L (ref 22–29)
GFR SERPL CREATININE-BSD FRML MDRD: >90 ML/MIN/1.73M2
GLUCOSE SERPL-MCNC: 99 MG/DL (ref 70–99)
POTASSIUM SERPL-SCNC: 4.4 MMOL/L (ref 3.4–5.3)
PROT SERPL-MCNC: 7.3 G/DL (ref 6.4–8.3)
SODIUM SERPL-SCNC: 138 MMOL/L (ref 136–145)
TSH SERPL DL<=0.005 MIU/L-ACNC: 1.58 UIU/ML (ref 0.3–4.2)
URATE SERPL-MCNC: 6 MG/DL (ref 3.4–7)
VIT B12 SERPL-MCNC: 468 PG/ML (ref 232–1245)

## 2023-07-24 ENCOUNTER — MYC MEDICAL ADVICE (OUTPATIENT)
Dept: PEDIATRICS | Facility: CLINIC | Age: 38
End: 2023-07-24
Payer: COMMERCIAL

## 2023-07-24 VITALS
HEART RATE: 100 BPM | BODY MASS INDEX: 30.54 KG/M2 | DIASTOLIC BLOOD PRESSURE: 74 MMHG | RESPIRATION RATE: 18 BRPM | SYSTOLIC BLOOD PRESSURE: 136 MMHG | TEMPERATURE: 98 F | OXYGEN SATURATION: 96 % | HEIGHT: 72 IN | WEIGHT: 225.5 LBS

## 2023-07-24 PROBLEM — F43.10 PTSD (POST-TRAUMATIC STRESS DISORDER): Status: ACTIVE | Noted: 2023-07-24

## 2023-07-24 NOTE — LETTER
Maximilian,      I have attempted to call you several times without success. I wanted to make sure you received the following message.       I re certified you for medical marijuana. I don't think there's a yearly fee anymore and I think you now only need recertificaiton once every 3 years.   If you want to try naltrexone to reduce alcohol cravings, let me know. It would require a blood test 1 month after starting  Regarding anxiety meds, I did a little digging. I think we should start with gabapentin instead of the one I mentioned. It is taken 3 times a day. You gradually increase the dose. If that doesn't work, we could try abilify or another one called buspar. I really want to get your anxiety down.     Let me know!     Best,     Ida Pantoja, FNP-DNP.           As always please feel free to reach out to me if you need assistance, questions answered or even to make an appointment.     I hope you are well,     Jasmyne Prieto, [facility] Health Guide  [phone]

## 2023-07-26 NOTE — TELEPHONE ENCOUNTER
Attempt #1. LVM with direct number for call back.     Jasmyne Prieto, EMT at 4:00 PM on July 26, 2023  [facility] Health Guide  [phone]

## 2023-07-27 NOTE — TELEPHONE ENCOUNTER
Attempt #2 LVM with direct number for call back.     Jasmyne Prieto, EMT at 9:45 AM on July 27, 2023  [facility] Health Guide  [phone]

## 2023-07-28 NOTE — TELEPHONE ENCOUNTER
Attempt #3. LVM. Pt is not reading [employer] messages so letter was sent. Ending encounter.     Jasmyne Prieto, EMT at 8:22 AM on July 28, 2023  [facility] Guide  [phone]

## 2023-07-29 ENCOUNTER — HEALTH MAINTENANCE LETTER (OUTPATIENT)
Age: 38
End: 2023-07-29

## 2023-08-03 ENCOUNTER — ALLIED HEALTH/NURSE VISIT (OUTPATIENT)
Dept: PEDIATRICS | Facility: CLINIC | Age: 38
End: 2023-08-03
Payer: COMMERCIAL

## 2023-08-03 VITALS — DIASTOLIC BLOOD PRESSURE: 68 MMHG | SYSTOLIC BLOOD PRESSURE: 132 MMHG

## 2023-08-03 DIAGNOSIS — I10 BENIGN ESSENTIAL HYPERTENSION: Primary | ICD-10-CM

## 2023-08-03 PROCEDURE — 99207 PR NO CHARGE NURSE ONLY: CPT

## 2023-08-03 NOTE — PROGRESS NOTES
I met with Mike Villalpando at the request of Ida Reaves to recheck his blood pressure.  Blood pressure medications on the med list were reviewed with patient.    Patient has taken all medications as per usual regimen: Yes  Patient reports tolerating them without any issues or concerns: Yes    Vitals:    08/03/23 1003   BP: 132/68       Blood pressure was taken, previous encounter was reviewed, recorded blood pressure below 140/90.  Patient was discharged and the note will be sent to the provider for final review.

## 2023-09-07 ENCOUNTER — PATIENT OUTREACH (OUTPATIENT)
Dept: CARE COORDINATION | Facility: CLINIC | Age: 38
End: 2023-09-07
Payer: COMMERCIAL

## 2023-09-07 NOTE — PROGRESS NOTES
Clinic Care Coordination Contact  [facility]/Voicemail    Referral Source: Self-patient/Caregiver  Clinical Data: Care Coordinator Outreach  Outreach attempted x 1.  Left message on patient's voicemail with call back information and requested return call.  Plan: Care Coordinator will try to reach patient again in 3-5 business days.    Randall Zapata [facility]  Clinic Care Coordinator  [facility]  [phone]  [email or web address]

## 2023-09-12 NOTE — PROGRESS NOTES
"Clinic Care Coordination Contact  [facility]/Voicemail    Referral Source: Self-patient/Caregiver  Clinical Data: Care Coordinator Outreach  Outreach attempted x 2. Spoke with pt briefly. Pt shares he is taken off guard by wirter's call today and is not in the headsapce to be able to talk in length about CD. Pt acknowledges he does want to talk further some day and will call back when he has his calendar in front of him to schedule a phone assessment.   Pt became concerned and said he was \"chickening out\". [facility] offered some reassurance our role is not to sign pt up for treatment, or services or place referrals without pt knowldege or agreement but rather to support pt in knowledge of what services are available to pt. To meet pt where he is at and assure he feels he has the tools to make a decision for support as he sees fit. Pt expressed appreciation for clarification and agreed to call [facility] when he had a better idea of a time/day. Pt again reiterated he wants to talk further and appreciates the follow up.     Plan: Care Coordinator will try to reach patient again in 3-5 business days.    Randall Zapata [facility]  Clinic Care Coordinator  [facility]  [phone]  [email or web address]      "

## 2023-09-18 NOTE — PROGRESS NOTES
Clinic Care Coordination Contact  [facility]/Voicemail    Referral Source: Self-patient/Caregiver  Clinical Data: Care Coordinator Outreach  Outreach attempted x 3. Sent [employer] message checking in. Plan:  Care Coordinator will try to reach patient again in 3-5 business days.    Randall Zapata [facility]  Clinic Care Coordinator  [facility]  [phone]  [email or web address]

## 2023-09-25 NOTE — PROGRESS NOTES
Clinic Care Coordination Contact    Situation: Patient chart reviewed by care coordinator.    Background: Care Coordination has attempted to reach pt to establish a plan of care r/t CD/MH.     Assessment: Pt did not want to commit to a treatment plan or plan of care and has not engaged with continued efforts to enroll into care coordination.     Plan/Recommendations: A [employer] message with CD resources and treatment options was sent on this date. No further outreaches will be made at this time unless a new referral is made or a change in the pt's status occurs. Patient was provided with this writer's contact information and encouraged to call with any questions or concerns.       Randall Zapata, [facility]  Clinic Care Coordinator  [facility]  [phone]  [email or web address]

## 2023-10-02 DIAGNOSIS — F32.A ANXIETY AND DEPRESSION: ICD-10-CM

## 2023-10-02 DIAGNOSIS — F41.9 ANXIETY AND DEPRESSION: ICD-10-CM

## 2023-10-05 RX ORDER — ESCITALOPRAM OXALATE 20 MG/1
20 TABLET ORAL DAILY
Qty: 90 TABLET | Refills: 3 | OUTPATIENT
Start: 2023-10-05

## 2024-02-07 ENCOUNTER — VIRTUAL VISIT (OUTPATIENT)
Dept: ENDOCRINOLOGY | Facility: CLINIC | Age: 39
End: 2024-02-07
Payer: COMMERCIAL

## 2024-02-07 VITALS — BODY MASS INDEX: 30.2 KG/M2 | WEIGHT: 223 LBS | HEIGHT: 72 IN

## 2024-02-07 DIAGNOSIS — N62 GYNECOMASTIA: Primary | ICD-10-CM

## 2024-02-07 DIAGNOSIS — R94.5 ABNORMAL RESULTS OF LIVER FUNCTION STUDIES: ICD-10-CM

## 2024-02-07 PROCEDURE — 99214 OFFICE O/P EST MOD 30 MIN: CPT | Mod: 95 | Performed by: INTERNAL MEDICINE

## 2024-02-07 PROCEDURE — G2211 COMPLEX E/M VISIT ADD ON: HCPCS | Mod: 95 | Performed by: INTERNAL MEDICINE

## 2024-02-07 ASSESSMENT — PAIN SCALES - GENERAL: PAINLEVEL: NO PAIN (0)

## 2024-02-07 NOTE — LETTER
2/7/2024       RE: Mike Villalpando  [address] [place] [address]     Dear Colleague,    Thank you for referring your patient, Mike Villalpando, to the [facility] ENDOCR[facility]OLOG[facility] [place] at [facility]. Please see a copy of my visit note below.        Video-Visit Details    Type of service:  Video Visit    Start 8:06 am  End: 8:20 am  [facility]well                                                                             - Endocrinology Follow up -    Reason for visit/consult:  Low testosterone, gynecomastia    Primary care provider: Krystal Rios        Assessment and Plan  39 year old male with hypogonadism    Hypogonadism  Other pituitary hormone normal (PRL 13), and signigicant history of EtOH take, I think due to large EtOH consumption and fatty liver can accerelate for low testosterone,   He has been on airmidex since 2021=2    -  continue arimidex 1 mg daily for now      - recheck total testosterone level, estradiol, CMP, SHBG    - if indicated, to try testosterone product (Gel),     Elevated LFTs  May due to Alcohol dependency    Alcohol dependency  He reduced EtOH dose, however back to previous high dose again, and currently 1 month abstinence per patient.       RTC with me in 6 month      Total 30 minutes spent on the date of the encounter doing chart review, history and exam, documentation and further activities as noted above.    The longitudinal plan of care for the condition(s) below were addressed during this visit. Due to the added complexity in care, I will continue to support Maximilian in the subsequent management of this condition(s) and with the ongoing continuity of care of this condition(s).    Problem List Items Addressed This Visit as of 2/7/2024   EtOH abstinence         Romi Mantilla MD  Staff Physician  Endocrinology and Metabolism  License: [id number]    Interval History as of 2/7/2024 : Patient has been doing well. He has  been EtOH abstinence for 1 month by himself and feeling better. Gynecomastia not much change noticed.   Interval History as of 11/16/2022 : Patient has been doing ok. . Medication compliance : not well  . New event includes : he admitted still taking alcohol occasionally. He said he has support.  Interval History as of 3/2/2022 :Started arimidex last visit, no significant change noticed and also he is not checking breast status as well recently. Back to high dose of alcohol take recently due to stress per patient.   HPI: A 37 yo male here for the evaluation of hypogonadism.  Patient has had noticed not the groin muscle despite frequent exercise in the gym.  Found out testosterone level was low.  In June 2021 10 AM total testosterone level was 175.  Repeat 1 in July 8 AM blood draw showing total testosterone again 186.  Other labs showing prolactin 13, LH 3.5, FSH 5.3.  Also he has had elevated liver function , AST 60.  Other medical history he has essential hypertension for the past 3 years and he has been seeing mental health team for PTSD and anxiety and depression.  He also noticed breast bilateral is growing without tenderness.  He admitted he has been drinking significant amount of alcohol for the past 3 years.  He mentioned he is drinking 2 cans of low-carb beer type of alcoholic beverage 8 to 12 cans day.    Past Medical/Surgical History:  Past Medical History:   Diagnosis Date    Hypertension     PTSD (post-traumatic stress disorder)     PTSD (post-traumatic stress disorder) 03/26/2021    PTSD (post-traumatic stress disorder) 03/01/2021    PTSD (post-traumatic stress disorder)     PTSD (post-traumatic stress disorder)      Past Surgical History:   Procedure Laterality Date    wisdom teeth         Allergies:  Allergies   Allergen Reactions    Amoxicillin      As a child    Neosporin [Neomycin-Polymyxin-Gramicidin]      As a child    Penicillin G      As a child    Septra [Sulfamethoxazole-Trimethoprim]  "     As a child       Current Medications   Current Outpatient Medications   Medication    allopurinol (ZYLOPRIM) 100 MG tablet    amLODIPine-benazepril (LOTREL) 10-40 MG capsule    anastrozole (ARIMIDEX) 1 MG tablet    buPROPion (WELLBUTRIN XL) 300 MG 24 hr tablet    escitalopram (LEXAPRO) 20 MG tablet     No current facility-administered medications for this visit.       Family History:  Family History   Problem Relation Age of Onset    Diabetes Mother     Hypertension Mother     Diabetes Maternal Grandfather     Coronary Artery Disease Early Onset Maternal Uncle     Cancer Father 66        Colon    Prostate Cancer No family hx of        Social History:  Social History     Tobacco Use    Smoking status: Former     Types: Dip, chew, snus or snuff, Cigarettes     Start date: 3/15/2023    Smokeless tobacco: Former     Types: Chew     Quit date: 6/1/2018    Tobacco comments:     occasionally   Substance Use Topics    Alcohol use: Yes     Comment: 4 days a week, 4-6 drinks per session       ROS:  Full review of systems taken with the help of the intake sheet. Otherwise a complete 14 point review of systems was taken and is negative unless stated in the history above.      Physical Exam:   Vitals: Ht 1.822 m (5' 11.75\")   Wt 101.2 kg (223 lb)   BMI 30.46 kg/m    BMI= Body mass index is 30.46 kg/m .   General: well appearing, no acute distress, pleasant and conversant,   Mental Status/neuro: alert and oriented  Face: symmetrical, normal facial color  Eyes: anicteric, PERRL, no proptosis or lid lag  Breast: mild gynecomastia by inspections through video  Resp: no acute distress      Labs : I reviewed data from epic and extract and summarize the pertinent data here.   Lab Results   Component Value Date     10/21/2021     06/08/2021      Lab Results   Component Value Date    POTASSIUM 4.6 10/21/2021    POTASSIUM 4.3 06/08/2021     Lab Results   Component Value Date    CHLORIDE 102 10/21/2021    CHLORIDE 104 " 06/08/2021     Lab Results   Component Value Date    DOM 9.7 10/21/2021    DOM 9.3 06/08/2021     Lab Results   Component Value Date    CO2 28 10/21/2021    CO2 28 06/08/2021     Lab Results   Component Value Date    BUN 12 10/21/2021    BUN 9 06/08/2021     Lab Results   Component Value Date    CR 0.85 10/21/2021    CR 0.78 06/08/2021     Lab Results   Component Value Date     10/21/2021    GLC 91 06/08/2021     Lab Results   Component Value Date    TSH 1.57 08/09/2021     Lab Results   Component Value Date    T4 0.77 08/09/2021     No results found for: A1C    No results found for: IGF1  Lab Results   Component Value Date    LH 3.3 08/09/2021     Lab Results   Component Value Date    FSH 5.5 08/09/2021     No results found for: ESTROGEN  Lab Results   Component Value Date    PROLACTIN 13 08/09/2021           MRI Brain: I personally reviewed the original images and agree with the below reports.   No results found for this or any previous visit.      Romi Mantilla MD

## 2024-02-07 NOTE — PROGRESS NOTES
Video-Visit Details    Type of service:  Video Visit    Start 8:06 am  End: 8:20 am  [facility]well                                                                             - Endocrinology Follow up -    Reason for visit/consult:  Low testosterone, gynecomastia    Primary care provider: Krystal Rios        Assessment and Plan  39 year old male with hypogonadism    Hypogonadism  Other pituitary hormone normal (PRL 13), and signigicant history of EtOH take, I think due to large EtOH consumption and fatty liver can accerelate for low testosterone,   He has been on airmidex since 2021=2    -  continue arimidex 1 mg daily for now      - recheck total testosterone level, estradiol, CMP, SHBG    - if indicated, to try testosterone product (Gel),     Elevated LFTs  May due to Alcohol dependency    Alcohol dependency  He reduced EtOH dose, however back to previous high dose again, and currently 1 month abstinence per patient.       RTC with me in 6 month      Total 30 minutes spent on the date of the encounter doing chart review, history and exam, documentation and further activities as noted above.    The longitudinal plan of care for the condition(s) below were addressed during this visit. Due to the added complexity in care, I will continue to support Maximilian in the subsequent management of this condition(s) and with the ongoing continuity of care of this condition(s).    Problem List Items Addressed This Visit as of 2/7/2024   EtOH abstinence         Romi Mantilla MD  Staff Physician  Endocrinology and Metabolism  License: [id number]    Interval History as of 2/7/2024 : Patient has been doing well. He has been EtOH abstinence for 1 month by himself and feeling better. Gynecomastia not much change noticed.   Interval History as of 11/16/2022 : Patient has been doing ok. . Medication compliance : not well  . New event includes : he admitted still taking alcohol occasionally. He said he has support.  Interval History as  of 3/2/2022 :Started arimidex last visit, no significant change noticed and also he is not checking breast status as well recently. Back to high dose of alcohol take recently due to stress per patient.   HPI: A 37 yo male here for the evaluation of hypogonadism.  Patient has had noticed not the groin muscle despite frequent exercise in the gym.  Found out testosterone level was low.  In June 2021 10 AM total testosterone level was 175.  Repeat 1 in July 8 AM blood draw showing total testosterone again 186.  Other labs showing prolactin 13, LH 3.5, FSH 5.3.  Also he has had elevated liver function , AST 60.  Other medical history he has essential hypertension for the past 3 years and he has been seeing mental health team for PTSD and anxiety and depression.  He also noticed breast bilateral is growing without tenderness.  He admitted he has been drinking significant amount of alcohol for the past 3 years.  He mentioned he is drinking 2 cans of low-carb beer type of alcoholic beverage 8 to 12 cans day.    Past Medical/Surgical History:  Past Medical History:   Diagnosis Date    Hypertension     PTSD (post-traumatic stress disorder)     PTSD (post-traumatic stress disorder) 03/26/2021    PTSD (post-traumatic stress disorder) 03/01/2021    PTSD (post-traumatic stress disorder)     PTSD (post-traumatic stress disorder)      Past Surgical History:   Procedure Laterality Date    wisdom teeth         Allergies:  Allergies   Allergen Reactions    Amoxicillin      As a child    Neosporin [Neomycin-Polymyxin-Gramicidin]      As a child    Penicillin G      As a child    Septra [Sulfamethoxazole-Trimethoprim]      As a child       Current Medications   Current Outpatient Medications   Medication    allopurinol (ZYLOPRIM) 100 MG tablet    amLODIPine-benazepril (LOTREL) 10-40 MG capsule    anastrozole (ARIMIDEX) 1 MG tablet    buPROPion (WELLBUTRIN XL) 300 MG 24 hr tablet    escitalopram (LEXAPRO) 20 MG tablet     No  "current facility-administered medications for this visit.       Family History:  Family History   Problem Relation Age of Onset    Diabetes Mother     Hypertension Mother     Diabetes Maternal Grandfather     Coronary Artery Disease Early Onset Maternal Uncle     Cancer Father 66        Colon    Prostate Cancer No family hx of        Social History:  Social History     Tobacco Use    Smoking status: Former     Types: Dip, chew, snus or snuff, Cigarettes     Start date: 3/15/2023    Smokeless tobacco: Former     Types: Chew     Quit date: 6/1/2018    Tobacco comments:     occasionally   Substance Use Topics    Alcohol use: Yes     Comment: 4 days a week, 4-6 drinks per session       ROS:  Full review of systems taken with the help of the intake sheet. Otherwise a complete 14 point review of systems was taken and is negative unless stated in the history above.      Physical Exam:   Vitals: Ht 1.822 m (5' 11.75\")   Wt 101.2 kg (223 lb)   BMI 30.46 kg/m    BMI= Body mass index is 30.46 kg/m .   General: well appearing, no acute distress, pleasant and conversant,   Mental Status/neuro: alert and oriented  Face: symmetrical, normal facial color  Eyes: anicteric, PERRL, no proptosis or lid lag  Breast: mild gynecomastia by inspections through video  Resp: no acute distress      Labs : I reviewed data from epic and extract and summarize the pertinent data here.   Lab Results   Component Value Date     10/21/2021     06/08/2021      Lab Results   Component Value Date    POTASSIUM 4.6 10/21/2021    POTASSIUM 4.3 06/08/2021     Lab Results   Component Value Date    CHLORIDE 102 10/21/2021    CHLORIDE 104 06/08/2021     Lab Results   Component Value Date    DOM 9.7 10/21/2021    DOM 9.3 06/08/2021     Lab Results   Component Value Date    CO2 28 10/21/2021    CO2 28 06/08/2021     Lab Results   Component Value Date    BUN 12 10/21/2021    BUN 9 06/08/2021     Lab Results   Component Value Date    CR 0.85 " 10/21/2021    CR 0.78 06/08/2021     Lab Results   Component Value Date     10/21/2021    GLC 91 06/08/2021     Lab Results   Component Value Date    TSH 1.57 08/09/2021     Lab Results   Component Value Date    T4 0.77 08/09/2021     No results found for: A1C    No results found for: IGF1  Lab Results   Component Value Date    LH 3.3 08/09/2021     Lab Results   Component Value Date    FSH 5.5 08/09/2021     No results found for: ESTROGEN  Lab Results   Component Value Date    PROLACTIN 13 08/09/2021           MRI Brain: I personally reviewed the original images and agree with the below reports.   No results found for this or any previous visit.

## 2024-02-07 NOTE — NURSING NOTE
Is the patient currently in the state of [place]? YES    Visit mode:VIDEO    If the visit is dropped, the patient can be reconnected by: VIDEO VISIT: Send to e-mail at: [email or web address]    Will anyone else be joining the visit? NO  (If patient encounters technical issues they should call [phone]    How would you like to obtain your AVS? MyChart    Are changes needed to the allergy or medication list? Pt stated no changes to allergies and Pt stated no med changes    Reason for visit: LUIS Marina LPN

## 2024-02-08 ENCOUNTER — LAB (OUTPATIENT)
Dept: LAB | Facility: CLINIC | Age: 39
End: 2024-02-08
Payer: COMMERCIAL

## 2024-02-08 DIAGNOSIS — N62 GYNECOMASTIA: ICD-10-CM

## 2024-02-08 PROCEDURE — 82670 ASSAY OF TOTAL ESTRADIOL: CPT

## 2024-02-08 PROCEDURE — 84403 ASSAY OF TOTAL TESTOSTERONE: CPT

## 2024-02-08 PROCEDURE — 80053 COMPREHEN METABOLIC PANEL: CPT

## 2024-02-08 PROCEDURE — 84270 ASSAY OF SEX HORMONE GLOBUL: CPT

## 2024-02-08 PROCEDURE — 36415 COLL VENOUS BLD VENIPUNCTURE: CPT

## 2024-02-09 ENCOUNTER — TELEPHONE (OUTPATIENT)
Dept: ENDOCRINOLOGY | Facility: CLINIC | Age: 39
End: 2024-02-09
Payer: COMMERCIAL

## 2024-02-09 LAB
ALBUMIN SERPL BCG-MCNC: 5 G/DL (ref 3.5–5.2)
ALP SERPL-CCNC: 51 U/L (ref 40–150)
ALT SERPL W P-5'-P-CCNC: 41 U/L (ref 0–70)
ANION GAP SERPL CALCULATED.3IONS-SCNC: 11 MMOL/L (ref 7–15)
AST SERPL W P-5'-P-CCNC: 25 U/L (ref 0–45)
BILIRUB SERPL-MCNC: 0.4 MG/DL
BUN SERPL-MCNC: 11 MG/DL (ref 6–20)
CALCIUM SERPL-MCNC: 9.6 MG/DL (ref 8.6–10)
CHLORIDE SERPL-SCNC: 100 MMOL/L (ref 98–107)
CREAT SERPL-MCNC: 0.78 MG/DL (ref 0.67–1.17)
DEPRECATED HCO3 PLAS-SCNC: 29 MMOL/L (ref 22–29)
EGFRCR SERPLBLD CKD-EPI 2021: >90 ML/MIN/1.73M2
ESTRADIOL SERPL-MCNC: <5 PG/ML
GLUCOSE SERPL-MCNC: 100 MG/DL (ref 70–99)
POTASSIUM SERPL-SCNC: 4.5 MMOL/L (ref 3.4–5.3)
PROT SERPL-MCNC: 7.5 G/DL (ref 6.4–8.3)
SHBG SERPL-SCNC: 27 NMOL/L (ref 11–80)
SODIUM SERPL-SCNC: 140 MMOL/L (ref 135–145)

## 2024-02-09 NOTE — TELEPHONE ENCOUNTER
Spoke with patient and scheduled the 6 mo follow-up appointment with Dr. Mantilla for 9/4/24- per the checkout order.

## 2024-02-10 LAB — TESTOST SERPL-MCNC: 255 NG/DL (ref 240–950)

## 2024-04-04 ENCOUNTER — VIRTUAL VISIT (OUTPATIENT)
Dept: PEDIATRICS | Facility: CLINIC | Age: 39
End: 2024-04-04
Payer: COMMERCIAL

## 2024-04-04 DIAGNOSIS — F33.9 EPISODE OF RECURRENT MAJOR DEPRESSIVE DISORDER, UNSPECIFIED DEPRESSION EPISODE SEVERITY (H): Primary | ICD-10-CM

## 2024-04-04 DIAGNOSIS — F43.10 PTSD (POST-TRAUMATIC STRESS DISORDER): ICD-10-CM

## 2024-04-04 DIAGNOSIS — F10.20 ALCOHOLISM (H): ICD-10-CM

## 2024-04-04 PROCEDURE — 99214 OFFICE O/P EST MOD 30 MIN: CPT | Mod: 93 | Performed by: NURSE PRACTITIONER

## 2024-04-04 RX ORDER — NALTREXONE HYDROCHLORIDE 50 MG/1
TABLET, FILM COATED ORAL
Qty: 88 TABLET | Refills: 0 | Status: SHIPPED | OUTPATIENT
Start: 2024-04-04 | End: 2024-07-30

## 2024-04-04 ASSESSMENT — ANXIETY QUESTIONNAIRES
2. NOT BEING ABLE TO STOP OR CONTROL WORRYING: NEARLY EVERY DAY
3. WORRYING TOO MUCH ABOUT DIFFERENT THINGS: NEARLY EVERY DAY
7. FEELING AFRAID AS IF SOMETHING AWFUL MIGHT HAPPEN: SEVERAL DAYS
1. FEELING NERVOUS, ANXIOUS, OR ON EDGE: SEVERAL DAYS
7. FEELING AFRAID AS IF SOMETHING AWFUL MIGHT HAPPEN: SEVERAL DAYS
GAD7 TOTAL SCORE: 13
8. IF YOU CHECKED OFF ANY PROBLEMS, HOW DIFFICULT HAVE THESE MADE IT FOR YOU TO DO YOUR WORK, TAKE CARE OF THINGS AT HOME, OR GET ALONG WITH OTHER PEOPLE?: VERY DIFFICULT
GAD7 TOTAL SCORE: 13
GAD7 TOTAL SCORE: 13
IF YOU CHECKED OFF ANY PROBLEMS ON THIS QUESTIONNAIRE, HOW DIFFICULT HAVE THESE PROBLEMS MADE IT FOR YOU TO DO YOUR WORK, TAKE CARE OF THINGS AT HOME, OR GET ALONG WITH OTHER PEOPLE: VERY DIFFICULT
4. TROUBLE RELAXING: SEVERAL DAYS
6. BECOMING EASILY ANNOYED OR IRRITABLE: NEARLY EVERY DAY
5. BEING SO RESTLESS THAT IT IS HARD TO SIT STILL: SEVERAL DAYS

## 2024-04-04 ASSESSMENT — PATIENT HEALTH QUESTIONNAIRE - PHQ9
SUM OF ALL RESPONSES TO PHQ QUESTIONS 1-9: 16
10. IF YOU CHECKED OFF ANY PROBLEMS, HOW DIFFICULT HAVE THESE PROBLEMS MADE IT FOR YOU TO DO YOUR WORK, TAKE CARE OF THINGS AT HOME, OR GET ALONG WITH OTHER PEOPLE: VERY DIFFICULT
SUM OF ALL RESPONSES TO PHQ QUESTIONS 1-9: 16

## 2024-04-04 ASSESSMENT — ENCOUNTER SYMPTOMS: NERVOUS/ANXIOUS: 1

## 2024-04-04 NOTE — PROGRESS NOTES
"Maximilian is a 39 year old who is being evaluated via a billable telephone visit.    Originating Location (pt. Location): Home    Distant Location (provider location):  On-site    Assessment & Plan     Episode of recurrent major depressive disorder, unspecified depression episode severity (H24)  PTSD (post-traumatic stress disorder)  Alcoholism (H)  Overall feeling anhedonic. Also having anxiety, but this is much worse when he is using alcohol. Did a month sober and felt better from an anxiety/anhedonia perspective. He isn't sure why he continues to drink. For now, declines treatment or psychiatry referral. Has a therapist. Has passive SI. No intent to harm.  - naltrexone (DEPADE/REVIA) 50 MG tablet; Take 0.5 tablets (25 mg) by mouth daily for 2 days, THEN 1 tablet (50 mg) daily for 87 days.  - Hepatic panel (Albumin, ALT, AST, Bili, Alk Phos, TP); Future  -Contracted for safety  -Wishes to start naltrexone to reduce ETOH cravings and minimize the amount of ETOH he drinks. Phone visit 3 weeks. LFTs 4 weeks.  -At next visit, consider prazosin for sleep, as he does have nightly night terrors  -He will consider referral to psychiatry in the future vs econsult with psychiatry.             BMI  Estimated body mass index is 30.46 kg/m  as calculated from the following:    Height as of 24: 1.822 m (5' 11.75\").    Weight as of 24: 101.2 kg (223 lb).       Depression Screening Follow Up                  Follow Up Actions Taken  Referred patient back to mental health provider    Discussed the following ways the patient can remain in a safe environment:  remove alcohol, remove things I could use to hurt myself: guns, and be around others        Subjective   Still drinkin fireballs and a six pack. Still cannabis  ETOH makes him less numb but less self worth    Having significant anhedonia. No libido.  Maximilian is a 39 year old, presenting for the following health issues:  Hypertension, Depression, and Anxiety      2024 "     1:03 PM   Additional Questions   Roomed by Oz Fowler   Accompanied by N/A         4/4/2024     1:03 PM   Patient Reported Additional Medications   Patient reports taking the following new medications No     Anxiety    History of Present Illness       Mental Health Follow-up:  Patient presents to follow-up on Depression & Anxiety.Patient's depression since last visit has been:  No change  The patient is having other symptoms associated with depression.  Patient's anxiety since last visit has been:  Worse  The patient is having other symptoms associated with anxiety.  Any significant life events: grief or loss  Patient is feeling anxious or having panic attacks.  Patient has no concerns about alcohol or drug use.    Hypertension: He presents for follow up of hypertension.  He does not check blood pressure  regularly outside of the clinic. Outpatient blood pressures have not been over 140/90. He does not follow a low salt diet.     He eats 0-1 servings of fruits and vegetables daily.He consumes 1 sweetened beverage(s) daily.He exercises with enough effort to increase his heart rate 9 or less minutes per day.  He exercises with enough effort to increase his heart rate 3 or less days per week.   He is taking medications regularly.                   Objective           Vitals:  No vitals were obtained today due to virtual visit.    Physical Exam   General: Alert and no distress //Respiratory: No audible wheeze, cough, or shortness of breath // Psychiatric:  Appropriate affect, tone, and pace of words            Phone call duration: 10 minutes  Signed Electronically by: MARIA FERNANDA GABRIEL CNP

## 2024-05-08 ENCOUNTER — LAB (OUTPATIENT)
Dept: LAB | Facility: CLINIC | Age: 39
End: 2024-05-08
Payer: COMMERCIAL

## 2024-05-08 DIAGNOSIS — I10 BENIGN ESSENTIAL HYPERTENSION: Primary | ICD-10-CM

## 2024-05-08 DIAGNOSIS — F10.20 ALCOHOLISM (H): ICD-10-CM

## 2024-05-08 LAB
ALBUMIN SERPL BCG-MCNC: 5 G/DL (ref 3.5–5.2)
ALP SERPL-CCNC: 52 U/L (ref 40–150)
ALT SERPL W P-5'-P-CCNC: 49 U/L (ref 0–70)
ANION GAP SERPL CALCULATED.3IONS-SCNC: 11 MMOL/L (ref 7–15)
AST SERPL W P-5'-P-CCNC: 40 U/L (ref 0–45)
BILIRUB DIRECT SERPL-MCNC: <0.2 MG/DL (ref 0–0.3)
BILIRUB SERPL-MCNC: 0.4 MG/DL
BUN SERPL-MCNC: 9.7 MG/DL (ref 6–20)
CALCIUM SERPL-MCNC: 9.5 MG/DL (ref 8.6–10)
CHLORIDE SERPL-SCNC: 102 MMOL/L (ref 98–107)
CREAT SERPL-MCNC: 0.81 MG/DL (ref 0.67–1.17)
DEPRECATED HCO3 PLAS-SCNC: 27 MMOL/L (ref 22–29)
EGFRCR SERPLBLD CKD-EPI 2021: >90 ML/MIN/1.73M2
GLUCOSE SERPL-MCNC: 118 MG/DL (ref 70–99)
POTASSIUM SERPL-SCNC: 4.8 MMOL/L (ref 3.4–5.3)
PROT SERPL-MCNC: 7.4 G/DL (ref 6.4–8.3)
SODIUM SERPL-SCNC: 140 MMOL/L (ref 135–145)

## 2024-05-08 PROCEDURE — 80053 COMPREHEN METABOLIC PANEL: CPT

## 2024-05-08 PROCEDURE — 82248 BILIRUBIN DIRECT: CPT

## 2024-05-08 PROCEDURE — 36415 COLL VENOUS BLD VENIPUNCTURE: CPT

## 2024-06-24 ENCOUNTER — APPOINTMENT (OUTPATIENT)
Dept: PEDIATRICS | Facility: CLINIC | Age: 39
End: 2024-06-24
Payer: COMMERCIAL

## 2024-06-24 DIAGNOSIS — F10.20 ALCOHOLISM (H): ICD-10-CM

## 2024-06-24 DIAGNOSIS — F41.9 ANXIETY AND DEPRESSION: ICD-10-CM

## 2024-06-24 DIAGNOSIS — F32.A ANXIETY AND DEPRESSION: ICD-10-CM

## 2024-06-24 RX ORDER — ESCITALOPRAM OXALATE 20 MG/1
20 TABLET ORAL DAILY
Qty: 90 TABLET | Refills: 1 | OUTPATIENT
Start: 2024-06-24

## 2024-06-24 RX ORDER — BUPROPION HYDROCHLORIDE 300 MG/1
300 TABLET ORAL EVERY MORNING
Qty: 90 TABLET | Refills: 1 | OUTPATIENT
Start: 2024-06-24

## 2024-06-24 RX ORDER — NALTREXONE HYDROCHLORIDE 50 MG/1
50 TABLET, FILM COATED ORAL DAILY
Qty: 90 TABLET | Refills: 1 | OUTPATIENT
Start: 2024-06-24 | End: 2024-12-21

## 2024-07-30 ENCOUNTER — MYC REFILL (OUTPATIENT)
Dept: PEDIATRICS | Facility: CLINIC | Age: 39
End: 2024-07-30
Payer: COMMERCIAL

## 2024-07-30 DIAGNOSIS — F10.20 ALCOHOLISM (H): ICD-10-CM

## 2024-07-30 RX ORDER — NALTREXONE HYDROCHLORIDE 50 MG/1
50 TABLET, FILM COATED ORAL DAILY
Qty: 90 TABLET | Refills: 3 | Status: SHIPPED | OUTPATIENT
Start: 2024-07-30

## 2024-09-04 ENCOUNTER — VIRTUAL VISIT (OUTPATIENT)
Dept: ENDOCRINOLOGY | Facility: CLINIC | Age: 39
End: 2024-09-04
Payer: COMMERCIAL

## 2024-09-04 VITALS — WEIGHT: 219 LBS | BODY MASS INDEX: 29.91 KG/M2

## 2024-09-04 DIAGNOSIS — F10.29 ALCOHOL DEPENDENCE WITH UNSPECIFIED ALCOHOL-INDUCED DISORDER (H): ICD-10-CM

## 2024-09-04 DIAGNOSIS — N62 GYNECOMASTIA: Primary | ICD-10-CM

## 2024-09-04 DIAGNOSIS — R79.89 LOW TESTOSTERONE: ICD-10-CM

## 2024-09-04 PROCEDURE — G2211 COMPLEX E/M VISIT ADD ON: HCPCS | Mod: 95 | Performed by: INTERNAL MEDICINE

## 2024-09-04 PROCEDURE — 99214 OFFICE O/P EST MOD 30 MIN: CPT | Mod: 95 | Performed by: INTERNAL MEDICINE

## 2024-09-04 RX ORDER — ANASTROZOLE 1 MG/1
1 TABLET ORAL DAILY
Qty: 90 TABLET | Refills: 3 | Status: SHIPPED | OUTPATIENT
Start: 2024-09-04

## 2024-09-04 NOTE — PROGRESS NOTES
Video-Visit Details    Type of service:  Video Visit    Start 8:00 am  End: 8:17 am  [facility]                                                                             - Endocrinology Follow up -    Reason for visit/consult:  Low testosterone, gynecomastia    Primary care provider: Krystal Rios        Assessment and Plan  39 year old male with hypogonadism    Hypogonadism  Other pituitary hormone normal (PRL 13), and signigicant history of EtOH take, I think due to large EtOH consumption and fatty liver can accerelate for low testosterone,   He has been on airmidex since 2021, but not on the medication for long time after that,     -  resume arimidex 1 mg daily    - recheck total testosterone level, estradiol in 4 months     Elevated LFTs  May due to Alcohol dependency, but this year 2024 so far lab of LFTs normal range.     Alcohol dependency  He reduced EtOH dose, however back to previous high dose again, and currently 1 month abstinence per patient.       RTC with me in 1 year       Total 30 minutes spent on the date of the encounter doing chart review, history and exam, documentation and further activities as noted above.    The longitudinal plan of care for the condition(s) below were addressed during this visit. Due to the added complexity in care, I will continue to support Maximilian in the subsequent management of this condition(s) and with the ongoing continuity of care of this condition(s).        Romi Mantilla MD  Staff Physician  Endocrinology and Metabolism  License: [id number]    Interval History as of 9/4/2024 : Patient has been doing relatively well. Abstinence from alcohol for one month, he has been followed by therapist once a year. Medication compliance: He has not received arimidex from the pharmacy yet.   Interval History as of 2/7/2024 : Patient has been doing well. He has been EtOH abstinence for 1 month by himself and feeling better. Gynecomastia not much change noticed.   Interval  History as of 11/16/2022 : Patient has been doing ok. . Medication compliance : not well  . New event includes : he admitted still taking alcohol occasionally. He said he has support.  Interval History as of 3/2/2022 :Started arimidex last visit, no significant change noticed and also he is not checking breast status as well recently. Back to high dose of alcohol take recently due to stress per patient.   HPI: A 35 yo male here for the evaluation of hypogonadism.  Patient has had noticed not the groin muscle despite frequent exercise in the gym.  Found out testosterone level was low.  In June 2021 10 AM total testosterone level was 175.  Repeat 1 in July 8 AM blood draw showing total testosterone again 186.  Other labs showing prolactin 13, LH 3.5, FSH 5.3.  Also he has had elevated liver function , AST 60.  Other medical history he has essential hypertension for the past 3 years and he has been seeing mental health team for PTSD and anxiety and depression.  He also noticed breast bilateral is growing without tenderness.  He admitted he has been drinking significant amount of alcohol for the past 3 years.  He mentioned he is drinking 2 cans of low-carb beer type of alcoholic beverage 8 to 12 cans day.    Past Medical/Surgical History:  Past Medical History:   Diagnosis Date    Hypertension     PTSD (post-traumatic stress disorder)     PTSD (post-traumatic stress disorder) 03/26/2021    PTSD (post-traumatic stress disorder) 03/01/2021    PTSD (post-traumatic stress disorder)     PTSD (post-traumatic stress disorder)      Past Surgical History:   Procedure Laterality Date    wisdom teeth         Allergies:  Allergies   Allergen Reactions    Amoxicillin      As a child    Neosporin [Neomycin-Polymyxin-Gramicidin]      As a child    Penicillin G      As a child    Septra [Sulfamethoxazole-Trimethoprim]      As a child       Current Medications   Current Outpatient Medications   Medication Sig Dispense Refill     allopurinol (ZYLOPRIM) 100 MG tablet Take 2 tablets (200 mg) by mouth daily 90 tablet 3    amLODIPine-benazepril (LOTREL) 10-40 MG capsule Take 1 capsule by mouth daily 90 capsule 3    anastrozole (ARIMIDEX) 1 MG tablet Take 1 tablet (1 mg) by mouth daily 90 tablet 3    buPROPion (WELLBUTRIN XL) 300 MG 24 hr tablet Take 1 tablet (300 mg) by mouth every morning 90 tablet 3    escitalopram (LEXAPRO) 20 MG tablet Take 1 tablet (20 mg) by mouth daily 90 tablet 3    naltrexone (DEPADE/REVIA) 50 MG tablet Take 1 tablet (50 mg) by mouth daily 90 tablet 3     No current facility-administered medications for this visit.       Family History:  Family History   Problem Relation Age of Onset    Diabetes Mother     Hypertension Mother     Diabetes Maternal Grandfather     Coronary Artery Disease Early Onset Maternal Uncle     Cancer Father 66        Colon    Prostate Cancer No family hx of        Social History:  Social History     Tobacco Use    Smoking status: Former     Types: Dip, chew, snus or snuff, Cigarettes     Start date: 3/15/2023    Smokeless tobacco: Former     Types: Chew     Quit date: 6/1/2018    Tobacco comments:     occasionally   Substance Use Topics    Alcohol use: Yes     Comment: 4 days a week, 4-6 drinks per session       ROS:  Full review of systems taken with the help of the intake sheet. Otherwise a complete 14 point review of systems was taken and is negative unless stated in the history above.      Physical Exam:   Vitals: There were no vitals taken for this visit.  BMI= There is no height or weight on file to calculate BMI.   General: well appearing, no acute distress, pleasant and conversant,   Mental Status/neuro: alert and oriented  Face: symmetrical, normal facial color  Eyes: anicteric, PERRL, no proptosis or lid lag  Breast: mild gynecomastia by inspections through video  Resp: no acute distress      Labs : I reviewed data from epic and extract and summarize the pertinent data here.   Lab  Results   Component Value Date     10/21/2021     06/08/2021      Lab Results   Component Value Date    POTASSIUM 4.6 10/21/2021    POTASSIUM 4.3 06/08/2021     Lab Results   Component Value Date    CHLORIDE 102 10/21/2021    CHLORIDE 104 06/08/2021     Lab Results   Component Value Date    DOM 9.7 10/21/2021    DOM 9.3 06/08/2021     Lab Results   Component Value Date    CO2 28 10/21/2021    CO2 28 06/08/2021     Lab Results   Component Value Date    BUN 12 10/21/2021    BUN 9 06/08/2021     Lab Results   Component Value Date    CR 0.85 10/21/2021    CR 0.78 06/08/2021     Lab Results   Component Value Date     10/21/2021    GLC 91 06/08/2021     Lab Results   Component Value Date    TSH 1.57 08/09/2021     Lab Results   Component Value Date    T4 0.77 08/09/2021     No results found for: A1C    No results found for: IGF1  Lab Results   Component Value Date    LH 3.3 08/09/2021     Lab Results   Component Value Date    FSH 5.5 08/09/2021     No results found for: ESTROGEN  Lab Results   Component Value Date    PROLACTIN 13 08/09/2021           MRI Brain: I personally reviewed the original images and agree with the below reports.   No results found for this or any previous visit.

## 2024-09-04 NOTE — LETTER
9/4/2024       RE: Mike Villalpando  [address] [place] [address]     Dear Colleague,    Thank you for referring your patient, Mike Villalpando, to the [facility] [place] [facility]. Please see a copy of my visit note below.        Video-Visit Details    Type of service:  Video Visit    Start 8:00 am  End: 8:17 am  [facility]                                                                             - Endocrinology Follow up -    Reason for visit/consult:  Low testosterone, gynecomastia    Primary care provider: Krystal Rios        Assessment and Plan  39 year old male with hypogonadism    Hypogonadism  Other pituitary hormone normal (PRL 13), and signigicant history of EtOH take, I think due to large EtOH consumption and fatty liver can accerelate for low testosterone,   He has been on airmidex since 2021, but not on the medication for long time after that,     -  resume arimidex 1 mg daily    - recheck total testosterone level, estradiol in 4 months     Elevated LFTs  May due to Alcohol dependency, but this year 2024 so far lab of LFTs normal range.     Alcohol dependency  He reduced EtOH dose, however back to previous high dose again, and currently 1 month abstinence per patient.       RTC with me in 1 year       Total 30 minutes spent on the date of the encounter doing chart review, history and exam, documentation and further activities as noted above.    The longitudinal plan of care for the condition(s) below were addressed during this visit. Due to the added complexity in care, I will continue to support Maximilian in the subsequent management of this condition(s) and with the ongoing continuity of care of this condition(s).        Romi Mantilla MD  Staff Physician  Endocrinology and Metabolism  License: [id number]    Interval History as of 9/4/2024 : Patient has been doing relatively well. Abstinence from alcohol for one  month, he has been followed by therapist once a year. Medication compliance: He has not received arimidex from the pharmacy yet.   Interval History as of 2/7/2024 : Patient has been doing well. He has been EtOH abstinence for 1 month by himself and feeling better. Gynecomastia not much change noticed.   Interval History as of 11/16/2022 : Patient has been doing ok. . Medication compliance : not well  . New event includes : he admitted still taking alcohol occasionally. He said he has support.  Interval History as of 3/2/2022 :Started arimidex last visit, no significant change noticed and also he is not checking breast status as well recently. Back to high dose of alcohol take recently due to stress per patient.   HPI: A 35 yo male here for the evaluation of hypogonadism.  Patient has had noticed not the groin muscle despite frequent exercise in the gym.  Found out testosterone level was low.  In June 2021 10 AM total testosterone level was 175.  Repeat 1 in July 8 AM blood draw showing total testosterone again 186.  Other labs showing prolactin 13, LH 3.5, FSH 5.3.  Also he has had elevated liver function , AST 60.  Other medical history he has essential hypertension for the past 3 years and he has been seeing mental health team for PTSD and anxiety and depression.  He also noticed breast bilateral is growing without tenderness.  He admitted he has been drinking significant amount of alcohol for the past 3 years.  He mentioned he is drinking 2 cans of low-carb beer type of alcoholic beverage 8 to 12 cans day.    Past Medical/Surgical History:  Past Medical History:   Diagnosis Date     Hypertension      PTSD (post-traumatic stress disorder)      PTSD (post-traumatic stress disorder) 03/26/2021     PTSD (post-traumatic stress disorder) 03/01/2021     PTSD (post-traumatic stress disorder)      PTSD (post-traumatic stress disorder)      Past Surgical History:   Procedure Laterality Date     wisdom teeth          Allergies:  Allergies   Allergen Reactions     Amoxicillin      As a child     Neosporin [Neomycin-Polymyxin-Gramicidin]      As a child     Penicillin G      As a child     Septra [Sulfamethoxazole-Trimethoprim]      As a child       Current Medications   Current Outpatient Medications   Medication Sig Dispense Refill     allopurinol (ZYLOPRIM) 100 MG tablet Take 2 tablets (200 mg) by mouth daily 90 tablet 3     amLODIPine-benazepril (LOTREL) 10-40 MG capsule Take 1 capsule by mouth daily 90 capsule 3     anastrozole (ARIMIDEX) 1 MG tablet Take 1 tablet (1 mg) by mouth daily 90 tablet 3     buPROPion (WELLBUTRIN XL) 300 MG 24 hr tablet Take 1 tablet (300 mg) by mouth every morning 90 tablet 3     escitalopram (LEXAPRO) 20 MG tablet Take 1 tablet (20 mg) by mouth daily 90 tablet 3     naltrexone (DEPADE/REVIA) 50 MG tablet Take 1 tablet (50 mg) by mouth daily 90 tablet 3     No current facility-administered medications for this visit.       Family History:  Family History   Problem Relation Age of Onset     Diabetes Mother      Hypertension Mother      Diabetes Maternal Grandfather      Coronary Artery Disease Early Onset Maternal Uncle      Cancer Father 66        Colon     Prostate Cancer No family hx of        Social History:  Social History     Tobacco Use     Smoking status: Former     Types: Dip, chew, snus or snuff, Cigarettes     Start date: 3/15/2023     Smokeless tobacco: Former     Types: Chew     Quit date: 6/1/2018     Tobacco comments:     occasionally   Substance Use Topics     Alcohol use: Yes     Comment: 4 days a week, 4-6 drinks per session       ROS:  Full review of systems taken with the help of the intake sheet. Otherwise a complete 14 point review of systems was taken and is negative unless stated in the history above.      Physical Exam:   Vitals: There were no vitals taken for this visit.  BMI= There is no height or weight on file to calculate BMI.   General: well appearing, no acute  distress, pleasant and conversant,   Mental Status/neuro: alert and oriented  Face: symmetrical, normal facial color  Eyes: anicteric, PERRL, no proptosis or lid lag  Breast: mild gynecomastia by inspections through video  Resp: no acute distress      Labs : I reviewed data from epic and extract and summarize the pertinent data here.   Lab Results   Component Value Date     10/21/2021     06/08/2021      Lab Results   Component Value Date    POTASSIUM 4.6 10/21/2021    POTASSIUM 4.3 06/08/2021     Lab Results   Component Value Date    CHLORIDE 102 10/21/2021    CHLORIDE 104 06/08/2021     Lab Results   Component Value Date    DOM 9.7 10/21/2021    DOM 9.3 06/08/2021     Lab Results   Component Value Date    CO2 28 10/21/2021    CO2 28 06/08/2021     Lab Results   Component Value Date    BUN 12 10/21/2021    BUN 9 06/08/2021     Lab Results   Component Value Date    CR 0.85 10/21/2021    CR 0.78 06/08/2021     Lab Results   Component Value Date     10/21/2021    GLC 91 06/08/2021     Lab Results   Component Value Date    TSH 1.57 08/09/2021     Lab Results   Component Value Date    T4 0.77 08/09/2021     No results found for: A1C    No results found for: IGF1  Lab Results   Component Value Date    LH 3.3 08/09/2021     Lab Results   Component Value Date    FSH 5.5 08/09/2021     No results found for: ESTROGEN  Lab Results   Component Value Date    PROLACTIN 13 08/09/2021           MRI Brain: I personally reviewed the original images and agree with the below reports.   No results found for this or any previous visit.        Again, thank you for allowing me to participate in the care of your patient.      Sincerely,    Romi Mantilla MD

## 2024-09-12 ENCOUNTER — TELEPHONE (OUTPATIENT)
Dept: ENDOCRINOLOGY | Facility: CLINIC | Age: 39
End: 2024-09-12
Payer: COMMERCIAL

## 2024-09-12 DIAGNOSIS — I10 BENIGN ESSENTIAL HYPERTENSION: Primary | ICD-10-CM

## 2024-09-12 RX ORDER — AMLODIPINE AND BENAZEPRIL HYDROCHLORIDE 10; 40 MG/1; MG/1
1 CAPSULE ORAL DAILY
Qty: 90 CAPSULE | Refills: 1 | Status: SHIPPED | OUTPATIENT
Start: 2024-09-12

## 2024-09-12 NOTE — TELEPHONE ENCOUNTER
Patient confirmed scheduled appointment:  Date: 12/02/24 and 09/03/25  Time: 9:15 and 9:00  Visit type: Labs and return endo  Provider: Annalee  Location: [facility]  Testing/imaging: Labs to be done at [facility] on 12/2  Additional notes: Spoke with patient and he confirmed he should be in [place] at the time of the visit.    Per checkout comments:  Return in about 1 year (around 9/4/2025).     Abi Uriostegui on 9/12/2024 at 10:39 AM

## 2024-09-12 NOTE — TELEPHONE ENCOUNTER
Will fill once schedules in person visit with labs 20 minutes prior. Route back once scheduled pls

## 2024-09-12 NOTE — TELEPHONE ENCOUNTER
Called and spoke with patient. Scheduled fro [facility] with pre-visit labs on 10/8/24.   PROVIDER - please fill - patient states he is out of med.  Hilaria Villalpando, CMA

## 2024-09-21 ENCOUNTER — HEALTH MAINTENANCE LETTER (OUTPATIENT)
Age: 39
End: 2024-09-21

## 2024-09-26 ENCOUNTER — DOCUMENTATION ONLY (OUTPATIENT)
Dept: PEDIATRICS | Facility: CLINIC | Age: 39
End: 2024-09-26
Payer: COMMERCIAL

## 2024-09-26 NOTE — PROGRESS NOTES
Mike GIRISH Villalpando has an upcoming lab appointment:    Future Appointments   Date Time Provider Department Car[facility]   10/8/2024 10:00 AM [facility]    10/8/2024 10:30 AM Ida Reaves APRN CNP [facility]    12/2/2024  9:15 AM [facility]    9/3/2025  9:00 AM Romi Mantilla MD [facility]     Patient is scheduled for the following lab(s): pre-visit labs    There is no order available. Please review and place either future orders or HMPO (Review of Health Maintenance Protocol Orders), as appropriate.    Health Maintenance Due   Topic    A1C     ANNUAL REVIEW OF HM ORDERS     CBC     URIC ACID     CMP      If no labs are needed at this time please have the Care Team contact patient regarding this information and cancel lab appointment. Thank you.     Licha PRATHER

## 2024-10-07 ASSESSMENT — ANXIETY QUESTIONNAIRES
8. IF YOU CHECKED OFF ANY PROBLEMS, HOW DIFFICULT HAVE THESE MADE IT FOR YOU TO DO YOUR WORK, TAKE CARE OF THINGS AT HOME, OR GET ALONG WITH OTHER PEOPLE?: VERY DIFFICULT
5. BEING SO RESTLESS THAT IT IS HARD TO SIT STILL: MORE THAN HALF THE DAYS
1. FEELING NERVOUS, ANXIOUS, OR ON EDGE: NEARLY EVERY DAY
GAD7 TOTAL SCORE: 13
4. TROUBLE RELAXING: SEVERAL DAYS
7. FEELING AFRAID AS IF SOMETHING AWFUL MIGHT HAPPEN: MORE THAN HALF THE DAYS
6. BECOMING EASILY ANNOYED OR IRRITABLE: NEARLY EVERY DAY
3. WORRYING TOO MUCH ABOUT DIFFERENT THINGS: SEVERAL DAYS
GAD7 TOTAL SCORE: 13
2. NOT BEING ABLE TO STOP OR CONTROL WORRYING: SEVERAL DAYS
IF YOU CHECKED OFF ANY PROBLEMS ON THIS QUESTIONNAIRE, HOW DIFFICULT HAVE THESE PROBLEMS MADE IT FOR YOU TO DO YOUR WORK, TAKE CARE OF THINGS AT HOME, OR GET ALONG WITH OTHER PEOPLE: VERY DIFFICULT
7. FEELING AFRAID AS IF SOMETHING AWFUL MIGHT HAPPEN: MORE THAN HALF THE DAYS
GAD7 TOTAL SCORE: 13

## 2024-10-07 ASSESSMENT — PATIENT HEALTH QUESTIONNAIRE - PHQ9
SUM OF ALL RESPONSES TO PHQ QUESTIONS 1-9: 10
SUM OF ALL RESPONSES TO PHQ QUESTIONS 1-9: 10
10. IF YOU CHECKED OFF ANY PROBLEMS, HOW DIFFICULT HAVE THESE PROBLEMS MADE IT FOR YOU TO DO YOUR WORK, TAKE CARE OF THINGS AT HOME, OR GET ALONG WITH OTHER PEOPLE: VERY DIFFICULT

## 2024-10-08 ENCOUNTER — OFFICE VISIT (OUTPATIENT)
Dept: PEDIATRICS | Facility: CLINIC | Age: 39
End: 2024-10-08
Payer: COMMERCIAL

## 2024-10-08 ENCOUNTER — LAB (OUTPATIENT)
Dept: LAB | Facility: CLINIC | Age: 39
End: 2024-10-08
Payer: COMMERCIAL

## 2024-10-08 ENCOUNTER — ANCILLARY PROCEDURE (OUTPATIENT)
Dept: GENERAL RADIOLOGY | Facility: CLINIC | Age: 39
End: 2024-10-08
Attending: NURSE PRACTITIONER
Payer: COMMERCIAL

## 2024-10-08 VITALS
RESPIRATION RATE: 18 BRPM | OXYGEN SATURATION: 96 % | WEIGHT: 224.8 LBS | SYSTOLIC BLOOD PRESSURE: 124 MMHG | HEART RATE: 79 BPM | TEMPERATURE: 97.9 F | BODY MASS INDEX: 31.47 KG/M2 | HEIGHT: 71 IN | DIASTOLIC BLOOD PRESSURE: 76 MMHG

## 2024-10-08 DIAGNOSIS — M25.531 RIGHT WRIST PAIN: Primary | ICD-10-CM

## 2024-10-08 DIAGNOSIS — F41.9 ANXIETY AND DEPRESSION: ICD-10-CM

## 2024-10-08 DIAGNOSIS — M25.531 RIGHT WRIST PAIN: ICD-10-CM

## 2024-10-08 DIAGNOSIS — F32.A ANXIETY AND DEPRESSION: ICD-10-CM

## 2024-10-08 DIAGNOSIS — M10.9 GOUT, UNSPECIFIED CAUSE, UNSPECIFIED CHRONICITY, UNSPECIFIED SITE: ICD-10-CM

## 2024-10-08 DIAGNOSIS — F10.20 ALCOHOLISM (H): ICD-10-CM

## 2024-10-08 DIAGNOSIS — I10 BENIGN ESSENTIAL HYPERTENSION: ICD-10-CM

## 2024-10-08 DIAGNOSIS — M10.9 GOUT, UNSPECIFIED CAUSE, UNSPECIFIED CHRONICITY, UNSPECIFIED SITE: Primary | ICD-10-CM

## 2024-10-08 LAB
ALBUMIN SERPL BCG-MCNC: 4.9 G/DL (ref 3.5–5.2)
ALP SERPL-CCNC: 66 U/L (ref 40–150)
ALT SERPL W P-5'-P-CCNC: 28 U/L (ref 0–70)
ANION GAP SERPL CALCULATED.3IONS-SCNC: 13 MMOL/L (ref 7–15)
AST SERPL W P-5'-P-CCNC: 26 U/L (ref 0–45)
BILIRUB SERPL-MCNC: 0.3 MG/DL
BUN SERPL-MCNC: 15.6 MG/DL (ref 6–20)
CALCIUM SERPL-MCNC: 9.6 MG/DL (ref 8.8–10.4)
CHLORIDE SERPL-SCNC: 101 MMOL/L (ref 98–107)
CREAT SERPL-MCNC: 0.74 MG/DL (ref 0.67–1.17)
EGFRCR SERPLBLD CKD-EPI 2021: >90 ML/MIN/1.73M2
ERYTHROCYTE [DISTWIDTH] IN BLOOD BY AUTOMATED COUNT: 12.3 % (ref 10–15)
EST. AVERAGE GLUCOSE BLD GHB EST-MCNC: 108 MG/DL
GLUCOSE SERPL-MCNC: 102 MG/DL (ref 70–99)
HBA1C MFR BLD: 5.4 % (ref 0–5.6)
HCO3 SERPL-SCNC: 26 MMOL/L (ref 22–29)
HCT VFR BLD AUTO: 44.3 % (ref 40–53)
HGB BLD-MCNC: 14.5 G/DL (ref 13.3–17.7)
MCH RBC QN AUTO: 30.8 PG (ref 26.5–33)
MCHC RBC AUTO-ENTMCNC: 32.7 G/DL (ref 31.5–36.5)
MCV RBC AUTO: 94 FL (ref 78–100)
PLATELET # BLD AUTO: 309 10E3/UL (ref 150–450)
POTASSIUM SERPL-SCNC: 4.5 MMOL/L (ref 3.4–5.3)
PROT SERPL-MCNC: 7.3 G/DL (ref 6.4–8.3)
RBC # BLD AUTO: 4.71 10E6/UL (ref 4.4–5.9)
SODIUM SERPL-SCNC: 140 MMOL/L (ref 135–145)
URATE SERPL-MCNC: 7.4 MG/DL (ref 3.4–7)
WBC # BLD AUTO: 6.6 10E3/UL (ref 4–11)

## 2024-10-08 PROCEDURE — 80053 COMPREHEN METABOLIC PANEL: CPT

## 2024-10-08 PROCEDURE — 85027 COMPLETE CBC AUTOMATED: CPT

## 2024-10-08 PROCEDURE — 36415 COLL VENOUS BLD VENIPUNCTURE: CPT

## 2024-10-08 PROCEDURE — 99214 OFFICE O/P EST MOD 30 MIN: CPT | Performed by: NURSE PRACTITIONER

## 2024-10-08 PROCEDURE — 84550 ASSAY OF BLOOD/URIC ACID: CPT

## 2024-10-08 PROCEDURE — G2211 COMPLEX E/M VISIT ADD ON: HCPCS | Performed by: NURSE PRACTITIONER

## 2024-10-08 PROCEDURE — 73110 X-RAY EXAM OF WRIST: CPT | Mod: TC | Performed by: RADIOLOGY

## 2024-10-08 PROCEDURE — 83036 HEMOGLOBIN GLYCOSYLATED A1C: CPT

## 2024-10-08 RX ORDER — NALTREXONE HYDROCHLORIDE 50 MG/1
50-100 TABLET, FILM COATED ORAL DAILY
Qty: 180 TABLET | Refills: 3 | Status: SHIPPED | OUTPATIENT
Start: 2024-10-08

## 2024-10-08 RX ORDER — PRAZOSIN HYDROCHLORIDE 1 MG/1
1 CAPSULE ORAL AT BEDTIME
Status: CANCELLED | OUTPATIENT
Start: 2024-10-08

## 2024-10-08 RX ORDER — ESCITALOPRAM OXALATE 20 MG/1
20 TABLET ORAL DAILY
Qty: 90 TABLET | Refills: 3 | Status: SHIPPED | OUTPATIENT
Start: 2024-10-08

## 2024-10-08 RX ORDER — BUPROPION HYDROCHLORIDE 300 MG/1
300 TABLET ORAL EVERY MORNING
Qty: 90 TABLET | Refills: 3 | Status: SHIPPED | OUTPATIENT
Start: 2024-10-08

## 2024-10-08 ASSESSMENT — PAIN SCALES - GENERAL: PAINLEVEL: MILD PAIN (2)

## 2024-10-08 NOTE — PROGRESS NOTES
"  Assessment & Plan     Anxiety and depression  Improved overall but still has periods of anxiety and depression. Fewer episodes of SI than in the past. Fewer nightmares.  -Declines prazosin for nightmares-will consider in the future  -Declines other med changes other than naltrexone below  -Continue therapy  -Understands crisis resourcesThe longitudinal plan of care for the diagnosis(es)/condition(s) as documented were addressed during this visit. Due to the added complexity in care, I will continue to support Maximilian in the subsequent management and with ongoing continuity of care.  - buPROPion (WELLBUTRIN XL) 300 MG 24 hr tablet; Take 1 tablet (300 mg) by mouth every morning.  - escitalopram (LEXAPRO) 20 MG tablet; Take 1 tablet (20 mg) by mouth daily.    Alcoholism (H)  Much improved with Naltrexone. Still drinks intermittently but will only drink 3-4 rather than 10+. Tolerating well.  -He will consider increasing naltrexone to 100. I've placed orders for , but he understands he shouldn't fluctuate, should choose one dose or the other. If increasing, should recheck LFTs in 1 month  - naltrexone (DEPADE/REVIA) 50 MG tablet; Take 1-2 tablets ( mg) by mouth daily.    Right wrist pain  FOOSH 3 weeks ago. No snuffbox pain, but having pain deep in the center of the wrist. Improving somewhat  -Recommended a hard wrist brace. To ortho if not improving in 2 weeks  - XR Wrist Right G/E 3 Views; Future    Gout, unspecified cause, unspecified chronicity, unspecified site  He is unsure if he is still taking allopurinol. He will check when he gets home and let me know. No gout flares in the last couple of years. Uric acid pending    BMI  Estimated body mass index is 31.35 kg/m  as calculated from the following:    Height as of this encounter: 1.803 m (5' 11\").    Weight as of this encounter: 102 kg (224 lb 12.8 oz).   Weight management plan: Discussed healthy diet and exercise guidelines          Subjective   Maximilian is " "a 39 year old, presenting for the following health issues:  Recheck Medication        10/8/2024    10:22 AM   Additional Questions   Roomed by Hilaria Villalpando CMA         10/8/2024    10:22 AM   Patient Reported Additional Medications   Patient reports taking the following new medications testosterone pill prescribed by  \"Alexi\"     History of Present Illness       Mental Health Follow-up:  Patient presents to follow-up on Depression & Anxiety.Patient's depression since last visit has been:  Medium  The patient is having other symptoms associated with depression.  Patient's anxiety since last visit has been:  Better  The patient is having other symptoms associated with anxiety.  Any significant life events: job concerns, financial concerns and grief or loss  Patient is feeling anxious or having panic attacks.  Patient has concerns about alcohol or drug use.    Hypertension: He presents for follow up of hypertension.  He does not check blood pressure  regularly outside of the clinic. Outside blood pressures have been over 140/90. He does not follow a low salt diet.     He eats 2-3 servings of fruits and vegetables daily.He consumes 1 sweetened beverage(s) daily.He exercises with enough effort to increase his heart rate 20 to 29 minutes per day.  He exercises with enough effort to increase his heart rate 3 or less days per week.   He is taking medications regularly.     Had fall approx 3 weeks ago on longboard - still having problems with wrist/thumb- asking about xray today?  Possible bruised ribs            Review of Systems  Constitutional, neuro, ENT, endocrine, pulmonary, cardiac, gastrointestinal, genitourinary, musculoskeletal, integument and psychiatric systems are negative, except as otherwise noted.      Objective    /76 (BP Location: Right arm, Cuff Size: Adult Large)   Pulse 79   Temp 97.9  F (36.6  C) (Tympanic)   Resp 18   Ht 1.803 m (5' 11\")   Wt 102 kg (224 lb 12.8 oz)   SpO2 96%   BMI " 31.35 kg/m    Body mass index is 31.35 kg/m .  Physical Exam   CONSTITUTIONAL: Alert, well-nourished, well-groomed, NAD  PSYCH: Bright affect. Appropriate mentation and speech.           Signed Electronically by: MARIA FERNANDA GABRIEL CNP

## 2024-10-22 ENCOUNTER — VIRTUAL VISIT (OUTPATIENT)
Dept: PEDIATRICS | Facility: CLINIC | Age: 39
End: 2024-10-22
Payer: COMMERCIAL

## 2024-10-22 DIAGNOSIS — F41.9 ANXIETY AND DEPRESSION: ICD-10-CM

## 2024-10-22 DIAGNOSIS — M25.531 RIGHT WRIST PAIN: Primary | ICD-10-CM

## 2024-10-22 DIAGNOSIS — F32.A ANXIETY AND DEPRESSION: ICD-10-CM

## 2024-10-22 PROCEDURE — 99214 OFFICE O/P EST MOD 30 MIN: CPT | Mod: 95 | Performed by: NURSE PRACTITIONER

## 2024-10-22 RX ORDER — HYDROXYZINE HYDROCHLORIDE 25 MG/1
25-75 TABLET, FILM COATED ORAL 2 TIMES DAILY PRN
Qty: 15 TABLET | Refills: 2 | Status: SHIPPED | OUTPATIENT
Start: 2024-10-22

## 2024-10-22 NOTE — PROGRESS NOTES
"Maximilian is a 39 year old who is being evaluated via a billable phone visit.    How would you like to obtain your AVS? Tagkasthart  If the video visit is dropped, the invitation should be resent by: Text to cell phone: [phone]  Will anyone else be joining your video visit? No      Assessment & Plan     Right wrist pain  Improving but not fully healed.  -If not fully improved in 2 weeks repeat xray to be sure we didn't miss a new fracture on last XR  - XR Wrist Right G/E 3 Views; Future    Anxiety and depression  Long discussion today. Has rapid spikes of simultaneous severe anhedonia and restlessness that tend to trigger him to drink. He doesn't feel he has the motivation to use coping skills during that time. No SI/HI today.   -Avoid with ETOH   -Asked him to work with his therapist on identifying the \"ramp up\" sooner at a time he could possibly use coping skills (walk, music, seeing a friend)  -Discuss with therapist role of marijuana  -Strongly recommended psychiatry referral. He will consider.   - hydrOXYzine HCl (ATARAX) 25 MG tablet; Take 1-3 tablets (25-75 mg) by mouth 2 times daily as needed for anxiety.  -Has crisis resources. Follow-up via [facility] after trying hydroxyzine.         Subjective   Maximilian is a 39 year old, presenting for the following health issues:  Follow Up        10/22/2024     9:48 AM   Additional Questions   Roomed by Paige BA   Accompanied by Self         10/22/2024     9:48 AM   Patient Reported Additional Medications   Patient reports taking the following new medications None     History of Present Illness       Reason for visit:  X ray follow up  : NA.  Symptoms include:  NA  : NA.  : Na.  : NA.  Prior treatment description:  NA  What makes it worse:  NA  What makes it better:  NA    He eats 2-3 servings of fruits and vegetables daily.He consumes 0 sweetened beverage(s) daily.He exercises with enough effort to increase his heart rate 30 to 60 minutes per day.  He exercises with enough " effort to increase his heart rate 3 or less days per week.   He is taking medications regularly.               Review of Systems  Constitutional, neuro, ENT, endocrine, pulmonary, cardiac, gastrointestinal, genitourinary, musculoskeletal, integument and psychiatric systems are negative, except as otherwise noted.      Objective    Vitals - Patient Reported  Weight (Patient Reported): 100.7 kg (222 lb)  Height (Patient Reported): 182.9 cm (6')  BMI (Based on Pt Reported Ht/Wt): 30.11  Pain Score: No Pain (1)  Pain Loc: Wrist (and ribs)        Physical Exam   Psych: Mentation intact. Normal speech.           Phone visit 25 minutes. Provider on site. Pt at home  Signed Electronically by: MARIA FERNANDA GABRIEL CNP

## 2024-11-06 ENCOUNTER — TRANSFERRED RECORDS (OUTPATIENT)
Dept: HEALTH INFORMATION MANAGEMENT | Facility: CLINIC | Age: 39
End: 2024-11-06
Payer: COMMERCIAL

## 2024-12-02 ENCOUNTER — LAB (OUTPATIENT)
Dept: LAB | Facility: CLINIC | Age: 39
End: 2024-12-02
Payer: COMMERCIAL

## 2024-12-02 DIAGNOSIS — R79.89 LOW TESTOSTERONE: ICD-10-CM

## 2024-12-02 DIAGNOSIS — F10.20 ALCOHOLISM (H): ICD-10-CM

## 2024-12-02 LAB
ALBUMIN SERPL BCG-MCNC: 4.9 G/DL (ref 3.5–5.2)
ALP SERPL-CCNC: 56 U/L (ref 40–150)
ALT SERPL W P-5'-P-CCNC: 25 U/L (ref 0–70)
AST SERPL W P-5'-P-CCNC: 25 U/L (ref 0–45)
BILIRUB DIRECT SERPL-MCNC: <0.2 MG/DL (ref 0–0.3)
BILIRUB SERPL-MCNC: 0.4 MG/DL
ESTRADIOL SERPL-MCNC: <5 PG/ML
PROT SERPL-MCNC: 7.2 G/DL (ref 6.4–8.3)

## 2024-12-02 PROCEDURE — 84403 ASSAY OF TOTAL TESTOSTERONE: CPT

## 2024-12-02 PROCEDURE — 36415 COLL VENOUS BLD VENIPUNCTURE: CPT

## 2024-12-02 PROCEDURE — 80076 HEPATIC FUNCTION PANEL: CPT

## 2024-12-02 PROCEDURE — 82670 ASSAY OF TOTAL ESTRADIOL: CPT

## 2024-12-05 LAB — TESTOST SERPL-MCNC: 486 NG/DL (ref 240–950)

## 2025-01-16 ENCOUNTER — TRANSFERRED RECORDS (OUTPATIENT)
Dept: HEALTH INFORMATION MANAGEMENT | Facility: CLINIC | Age: 40
End: 2025-01-16
Payer: COMMERCIAL

## 2025-03-18 DIAGNOSIS — I10 BENIGN ESSENTIAL HYPERTENSION: ICD-10-CM

## 2025-03-18 RX ORDER — AMLODIPINE AND BENAZEPRIL HYDROCHLORIDE 10; 40 MG/1; MG/1
1 CAPSULE ORAL DAILY
Qty: 90 CAPSULE | Refills: 1 | Status: SHIPPED | OUTPATIENT
Start: 2025-03-18

## 2025-07-08 ENCOUNTER — TELEPHONE (OUTPATIENT)
Dept: ENDOCRINOLOGY | Facility: CLINIC | Age: 40
End: 2025-07-08
Payer: COMMERCIAL

## 2025-07-08 NOTE — TELEPHONE ENCOUNTER
Patient confirmed scheduled appointment:  Date: 8/20/25  Time: 12:30 pm SUKHWINDER Adrian by Molly MIRANDA  Visit type: RETURN ENDOCRINE  Provider: Romi Mantilla MD  Location: [facility]  Testing/imaging: N/A  Additional notes: Spoke to patient and rescheduled appointment from 9/3 due to changes in the providers schedule.  Patient asked if it could be moved up any since he is almost out of his medication.    Joe Cruz on 7/8/2025 at 10:21 AM

## 2025-08-20 ENCOUNTER — VIRTUAL VISIT (OUTPATIENT)
Dept: ENDOCRINOLOGY | Facility: CLINIC | Age: 40
End: 2025-08-20
Payer: COMMERCIAL

## 2025-08-20 DIAGNOSIS — R79.89 LOW TESTOSTERONE: ICD-10-CM

## 2025-08-20 DIAGNOSIS — N62 GYNECOMASTIA: Primary | ICD-10-CM

## 2025-08-20 RX ORDER — TESTOSTERONE 1.62 MG/G
1 GEL TRANSDERMAL DAILY
Qty: 75 G | Refills: 5 | Status: SHIPPED | OUTPATIENT
Start: 2025-08-20

## 2025-08-20 RX ORDER — ANASTROZOLE 1 MG/1
1 TABLET ORAL DAILY
Qty: 90 TABLET | Refills: 3 | Status: SHIPPED | OUTPATIENT
Start: 2025-08-20

## 2025-08-20 ASSESSMENT — PAIN SCALES - GENERAL: PAINLEVEL_OUTOF10: NO PAIN (0)
